# Patient Record
Sex: FEMALE | Race: WHITE | Employment: OTHER | URBAN - NONMETROPOLITAN AREA
[De-identification: names, ages, dates, MRNs, and addresses within clinical notes are randomized per-mention and may not be internally consistent; named-entity substitution may affect disease eponyms.]

---

## 2017-01-16 ENCOUNTER — TELEPHONE (OUTPATIENT)
Dept: FAMILY MEDICINE | Facility: OTHER | Age: 62
End: 2017-01-16

## 2017-01-16 DIAGNOSIS — R82.90 ABNORMAL URINE FINDINGS: ICD-10-CM

## 2017-01-16 DIAGNOSIS — N30.00 ACUTE CYSTITIS WITHOUT HEMATURIA: ICD-10-CM

## 2017-01-16 DIAGNOSIS — E78.5 HYPERLIPIDEMIA LDL GOAL <130: ICD-10-CM

## 2017-01-16 DIAGNOSIS — R30.0 DYSURIA: Primary | ICD-10-CM

## 2017-01-16 LAB
ALBUMIN UR-MCNC: 100 MG/DL
APPEARANCE UR: ABNORMAL
BACTERIA #/AREA URNS HPF: ABNORMAL /HPF
BILIRUB UR QL STRIP: NEGATIVE
COLOR UR AUTO: ABNORMAL
GLUCOSE UR STRIP-MCNC: NEGATIVE MG/DL
HGB UR QL STRIP: ABNORMAL
KETONES UR STRIP-MCNC: NEGATIVE MG/DL
LEUKOCYTE ESTERASE UR QL STRIP: ABNORMAL
NITRATE UR QL: NEGATIVE
PH UR STRIP: 5.5 PH (ref 4.7–8)
RBC #/AREA URNS AUTO: >182 /HPF (ref 0–2)
SP GR UR STRIP: 1.02 (ref 1–1.03)
SQUAMOUS #/AREA URNS AUTO: 1 /HPF (ref 0–1)
URN SPEC COLLECT METH UR: ABNORMAL
UROBILINOGEN UR STRIP-MCNC: NORMAL MG/DL (ref 0–2)
WBC #/AREA URNS AUTO: >182 /HPF (ref 0–2)
WBC CLUMPS #/AREA URNS HPF: PRESENT /HPF

## 2017-01-16 PROCEDURE — 87086 URINE CULTURE/COLONY COUNT: CPT | Performed by: FAMILY MEDICINE

## 2017-01-16 PROCEDURE — 87088 URINE BACTERIA CULTURE: CPT | Performed by: FAMILY MEDICINE

## 2017-01-16 PROCEDURE — 81001 URINALYSIS AUTO W/SCOPE: CPT | Performed by: FAMILY MEDICINE

## 2017-01-16 PROCEDURE — 87186 SC STD MICRODIL/AGAR DIL: CPT | Performed by: FAMILY MEDICINE

## 2017-01-16 RX ORDER — SULFAMETHOXAZOLE/TRIMETHOPRIM 800-160 MG
1 TABLET ORAL 2 TIMES DAILY
Qty: 14 TABLET | Refills: 0 | Status: SHIPPED | OUTPATIENT
Start: 2017-01-16 | End: 2017-09-07

## 2017-01-16 NOTE — TELEPHONE ENCOUNTER
Called and notified unable to prescribe antibiotic over the phone for a UTI without having a UA sample. Order for UA placed in lab.

## 2017-01-16 NOTE — TELEPHONE ENCOUNTER
Tried calling again, left message that if she does have a health concern to make an appointment with Dr Mariela Dolan for evaluation. Scheduling number given.

## 2017-01-16 NOTE — TELEPHONE ENCOUNTER
2:37 PM    Reason for Call: Phone Call    Description: PT wants the nurse to call her back, she said it is personal, and it is a health concern    Was an appointment offered for this call? Yes     Preferred method for responding to this message: Telephone Call  [phone]    If we cannot reach you directly, may we leave a detailed response at the number you provided? No    Can this message wait until your PCP/provider returns, if available today? Not applicable, PCP is in    Jessica Hobbs

## 2017-01-16 NOTE — TELEPHONE ENCOUNTER
2:01 PM    Reason for Call: Phone Call    Description: Patient is inquiring about an antibiotic for a UTI. If you could call back at your earliest convenience [phone]    Was an appointment offered for this call? No patient would like to speak to the nurse before    Preferred method for responding to this message: Telephone Call    If we cannot reach you directly, may we leave a detailed response at the number you provided? Yes    Can this message wait until your PCP/provider returns, if available today? YES    Meme Cam

## 2017-01-18 LAB
BACTERIA SPEC CULT: ABNORMAL
MICRO REPORT STATUS: ABNORMAL
MICROORGANISM SPEC CULT: ABNORMAL
SPECIMEN SOURCE: ABNORMAL

## 2017-01-19 ENCOUNTER — HOSPITAL ENCOUNTER (EMERGENCY)
Facility: HOSPITAL | Age: 62
Discharge: HOME | End: 2017-01-19
Attending: EMERGENCY MEDICINE | Admitting: EMERGENCY MEDICINE
Payer: COMMERCIAL

## 2017-01-19 VITALS
RESPIRATION RATE: 18 BRPM | TEMPERATURE: 98.6 F | OXYGEN SATURATION: 98 % | HEART RATE: 85 BPM | SYSTOLIC BLOOD PRESSURE: 135 MMHG | DIASTOLIC BLOOD PRESSURE: 78 MMHG

## 2017-01-19 DIAGNOSIS — S62.102A FRACTURE OF WRIST, LEFT, CLOSED, INITIAL ENCOUNTER: ICD-10-CM

## 2017-01-19 PROCEDURE — 99283 EMERGENCY DEPT VISIT LOW MDM: CPT

## 2017-01-19 PROCEDURE — 99283 EMERGENCY DEPT VISIT LOW MDM: CPT | Performed by: EMERGENCY MEDICINE

## 2017-01-19 PROCEDURE — 73110 X-RAY EXAM OF WRIST: CPT | Mod: TC,LT

## 2017-01-19 PROCEDURE — 25000132 ZZH RX MED GY IP 250 OP 250 PS 637: Performed by: EMERGENCY MEDICINE

## 2017-01-19 RX ORDER — OXYCODONE AND ACETAMINOPHEN 5; 325 MG/1; MG/1
2 TABLET ORAL ONCE
Status: COMPLETED | OUTPATIENT
Start: 2017-01-19 | End: 2017-01-19

## 2017-01-19 RX ADMIN — OXYCODONE HYDROCHLORIDE AND ACETAMINOPHEN 2 TABLET: 5; 325 TABLET ORAL at 20:28

## 2017-01-19 ASSESSMENT — ENCOUNTER SYMPTOMS
RESPIRATORY NEGATIVE: 1
CARDIOVASCULAR NEGATIVE: 1
ACTIVITY CHANGE: 1

## 2017-01-19 NOTE — ED AVS SNAPSHOT
[facility]    [address]    [place] [address]    Phone:  [phone]                                       Diana Roach   MRN: [id number]    Department:  [facility]   Date of Visit:  1/19/2017           Patient Information     Date Of Birth          1955        Your diagnoses for this visit were:     Fracture of wrist, left, closed, initial encounter        You were seen by Austin Zuluaga MD.      Follow-up Information     Follow up with Mariela Dolan MD.    Specialty:  Family Practice    Contact information:    [facility] [place] [address]  [phone]          Discharge Instructions         Treating Wrist Fractures  A fractured bone starts to heal on its own right away. But a treatment called reduction may help you heal better. Reduction is a process that repositions your bones. The goal is to get them as close as possible to how they were before the fracture. Your doctor will use one or more methods of reduction.     An external fixator is a rigid bar that screws into the bone through tiny holes made in the skin. It holds the fractured segments of bone in place.   Closed reduction  If you have a clean break with little soft tissue damage, closed reduction will probably be used. Before the procedure, you may be given a light anesthetic to relax your muscles. Then your doctor manually readjusts the position of the broken bone. A splint or cast will be worn while you heal.     A plate with tiny screws helps keep the bone stable and in place.   Open reduction  If you have an open fracture (bone sticking out through the skin), badly misaligned sections of bone, or severe tissue injury, open reduction is likely. A general anesthetic may be used during the procedure to let you sleep and relax your muscles. Your doctor then makes one or more incisions to realign the bone and repair soft tissues. Pins, screws, plates, or a combination of  implants may be used under the skin to hold the bone in place during healing. Another device that may be used is an external fixator, which holds the bones in the correct position, and is surgically placed on the outside of the skin.  The road to healing  Fractures take about 6 weeks or more to heal. Keeping your hand raised above your heart can control swelling, throbbing, and pain. Your doctor may prescribe medicine that can help reduce pain. Don t remove a splint unless your doctor says you can. Call your doctor if your pain gets worse or if you notice any excess swelling or redness. Sometimes these implants, especially wires, may need to be removed after the fracture has healed.      5132-3596 [employer]. [address] [place] [address], [place] [address]. All rights reserved. This information is not intended as a substitute for professional medical care. Always follow your healthcare professional's instructions.      Diana,  Your wrist fracture needs surgery sooner rather later so hopefully all will work out as planned tomorrow.  Remember NPO after [place] until they give you the time and make the specific recommendation when they call from [facility].  Good luck!       Review of your medicines      Our records show that you are taking the medicines listed below. If these are incorrect, please call your family doctor or clinic.        Dose / Directions Last dose taken    alendronate 70 MG tablet   Commonly known as:  FOSAMAX   Quantity:  12 tablet        TAKE 1 TABLET BY MOUTH ONCE A WEEK ON AN EMPTY STOMA CH GENERICFOR FOSAMAX.   Refills:  3        IBUPROFEN PO   Dose:  400 mg        Take 400 mg by mouth every 6 hours as needed for moderate pain   Refills:  0        * pramipexole 0.5 MG tablet   Commonly known as:  MIRAPEX   Quantity:  90 tablet        TAKE 1 TO 2 TABLETS BY MOUTH 2 TO 3 HOURS BEFORE BEDTIME   Refills:  9        * pramipexole 0.5 MG tablet   Commonly known as:  MIRAPEX   Quantity:  90 tablet         TAKE 1 TO 2 TABLETS BY MOUTH 2 TO 3 HOURS BEFOR E BEDTIME.   Refills:  8        rosuvastatin 5 MG tablet   Commonly known as:  CRESTOR   Dose:  5 mg   Quantity:  30 tablet        Take 1 tablet (5 mg) by mouth daily   Refills:  3        sulfamethoxazole-trimethoprim 800-160 MG per tablet   Commonly known as:  BACTRIM DS/SEPTRA DS   Dose:  1 tablet   Quantity:  14 tablet        Take 1 tablet by mouth 2 times daily   Refills:  0        SUMAtriptan 100 MG tablet   Commonly known as:  IMITREX   Quantity:  9 tablet        TAKE 1 TABLET BY MOUTH WITH FLUIDS ASAP AFTER START OF MIGRAINE. MAY REPEAT AFTER 2 HOURS IF HEADACH E RETURNS DO NOT E   Refills:  2        UNKNOWN TO PATIENT        Topical ointment prescribed by Dr Townsend   Refills:  0        VANIQA 13.9 % Crea   Quantity:  45 g   Generic drug:  eflornithine HCl        APPLY TO AFFECTED AREAS TWICE A DAY   Refills:  0        ZETIA 10 MG tablet   Quantity:  90 tablet   Generic drug:  ezetimibe        TAKE 1 TABLET (10 MG) BY MOUTH DAILY   Refills:  2        * Notice:  This list has 2 medication(s) that are the same as other medications prescribed for you. Read the directions carefully, and ask your doctor or other care provider to review them with you.            Procedures and tests performed during your visit     Wrist XR, G/E 3 views, left      Orders Needing Specimen Collection     None      Pending Results     Date and Time Order Name Status Description    1/19/2017 1859 Wrist XR, G/E 3 views, left In process             Pending Culture Results     No orders found from 1/18/2017 to 1/20/2017.            Thank you for choosing [facility]       Thank you for choosing [facility] for your care. Our goal is always to provide you with excellent care. Hearing back from our patients is one way we can continue to improve our services. Please take a few minutes to complete the written survey that you may receive in the mail after you visit with us. Thank you!        Gloria  "Information     [employer] lets you send messages to your doctor, view your test results, renew your prescriptions, schedule appointments and more. To sign up, go to [email or web address] . Click on \"Log in\" on the left side of the screen, which will take you to the Welcome page. Then click on \"Sign up Now\" on the right side of the page.     You will be asked to enter the access code listed below, as well as some personal information. Please follow the directions to create your username and password.     Your access code is: [email or web address]  Expires: 2017  8:20 PM     Your access code will  in 90 days. If you need help or a new code, please call your [facility] clinic or [phone].        Care EveryWhere ID     This is your Care EveryWhere ID. This could be used by other organizations to access your [facility] medical records  [id number]        After Visit Summary       This is your record. Keep this with you and show to your community pharmacist(s) and doctor(s) at your next visit.                  "

## 2017-01-19 NOTE — ED AVS SNAPSHOT
[facility]    [address]    [place] [address]    Phone:  [phone]                                       Diana Roach   MRN: [id number]    Department:  [facility]   Date of Visit:  1/19/2017           After Visit Summary Signature Page     I have received my discharge instructions, and my questions have been answered. I have discussed any challenges I see with this plan with the nurse or doctor.    ..........................................................................................................................................  Patient/Patient Representative Signature      ..........................................................................................................................................  Patient Representative Print Name and Relationship to Patient    ..................................................               ................................................  Date                                            Time    ..........................................................................................................................................  Reviewed by Signature/Title    ...................................................              ..............................................  Date                                                            Time

## 2017-01-20 ENCOUNTER — TRANSFERRED RECORDS (OUTPATIENT)
Dept: HEALTH INFORMATION MANAGEMENT | Facility: HOSPITAL | Age: 62
End: 2017-01-20

## 2017-01-20 NOTE — ED NOTES
Pt states slipped on the ice on the driveway and injured her left wrist. Pt presents with ice on. Elevated on a pillow. Rings X2 removed, placed in a cup and labeled. CMS intact.

## 2017-01-20 NOTE — ED NOTES
Patient discharged home at this time. Given written and verbal discharge instructions regarding pain control, RICE and to remain NPO after midnight for tomorrow's surgery. Patient given 2 take home Percocet with administration instructions. Patient verbalized understanding of all discharge instructions. Patient given disc of images prior to discharge.

## 2017-01-20 NOTE — ED PROVIDER NOTES
History     Chief Complaint   Patient presents with     Wrist Pain     HPI  Diana Roach is a 61 year old female who slipped on ice breaking her FOOSH sustaining a painful somewhat deformed left wrist.  No other injuries.     I have reviewed the Medications, Allergies, Past Medical and Surgical History, and Social History in the Epic system.    Review of Systems   Constitutional: Positive for activity change.   HENT: Negative.    Respiratory: Negative.    Cardiovascular: Negative.    Musculoskeletal:        Left wrist pain       Physical Exam   BP: 142/74 mmHg  Pulse: 85  Temp: 98.8  F (37.1  C)  Resp: 18  SpO2: 96 %  Physical Exam   Constitutional: She appears well-developed and well-nourished. She appears distressed.   HENT:   Head: Normocephalic.   Eyes: Conjunctivae and EOM are normal. Pupils are equal, round, and reactive to light.   Neck: Normal range of motion. Neck supple.   Cardiovascular: Normal rate.    Pulmonary/Chest: Effort normal.   Abdominal: Soft.   Musculoskeletal:   Left wrist shows prominent ulnar styloid and foreshortened distal radius.    Neurological: She is alert.   Skin: Skin is warm. She is not diaphoretic.     ED Course   Procedures  Critical Care time:  none    Labs Ordered and Resulted from Time of ED Arrival Up to the Time of Departure from the ED - No data to display    Assessments & Plan (with Medical Decision Making)   Diana sustained a Colles Fx documented on XRs with impaction of the distal radius resulting in severe reversal of the ulnar carpal angle and 30 degree reversal of the volar carpal angle.  Maribel splint was placed.  Denancourt unavailable in the next several days so made arrangements with OA on call Sherwin Olguin who will arrange for surgery tomorrow.   She should be called and will be NPO.    I have reviewed the nursing notes.    I have reviewed the findings, diagnosis, plan and need for follow up with the patient.    New Prescriptions    No medications on file        Final diagnoses:   Fracture of wrist, left, closed, initial encounter       1/19/2017   [facility]      Austin Zuluaga MD  01/19/17 1397

## 2017-01-20 NOTE — DISCHARGE INSTRUCTIONS
Treating Wrist Fractures  A fractured bone starts to heal on its own right away. But a treatment called reduction may help you heal better. Reduction is a process that repositions your bones. The goal is to get them as close as possible to how they were before the fracture. Your doctor will use one or more methods of reduction.     An external fixator is a rigid bar that screws into the bone through tiny holes made in the skin. It holds the fractured segments of bone in place.   Closed reduction  If you have a clean break with little soft tissue damage, closed reduction will probably be used. Before the procedure, you may be given a light anesthetic to relax your muscles. Then your doctor manually readjusts the position of the broken bone. A splint or cast will be worn while you heal.     A plate with tiny screws helps keep the bone stable and in place.   Open reduction  If you have an open fracture (bone sticking out through the skin), badly misaligned sections of bone, or severe tissue injury, open reduction is likely. A general anesthetic may be used during the procedure to let you sleep and relax your muscles. Your doctor then makes one or more incisions to realign the bone and repair soft tissues. Pins, screws, plates, or a combination of implants may be used under the skin to hold the bone in place during healing. Another device that may be used is an external fixator, which holds the bones in the correct position, and is surgically placed on the outside of the skin.  The road to healing  Fractures take about 6 weeks or more to heal. Keeping your hand raised above your heart can control swelling, throbbing, and pain. Your doctor may prescribe medicine that can help reduce pain. Don t remove a splint unless your doctor says you can. Call your doctor if your pain gets worse or if you notice any excess swelling or redness. Sometimes these implants, especially wires, may need to be removed after the fracture has  healed.      [id number] [employer]. [address] [place] [address], [place] [address]. All rights reserved. This information is not intended as a substitute for professional medical care. Always follow your healthcare professional's instructions.      Diana,  Your wrist fracture needs surgery sooner rather later so hopefully all will work out as planned tomorrow.  Remember NPO after MN until they give you the time and make the specific recommendation when they call from [facility].  Good luck!

## 2017-02-01 DIAGNOSIS — M80.00XA AGE-RELATED OSTEOPOROSIS WITH CURRENT PATHOLOGICAL FRACTURE: Primary | ICD-10-CM

## 2017-02-01 DIAGNOSIS — M81.0 OSTEOPOROSIS: ICD-10-CM

## 2017-02-02 ENCOUNTER — TRANSFERRED RECORDS (OUTPATIENT)
Dept: HEALTH INFORMATION MANAGEMENT | Facility: HOSPITAL | Age: 62
End: 2017-02-02

## 2017-02-02 RX ORDER — ALENDRONATE SODIUM 70 MG/1
TABLET ORAL
Qty: 12 TABLET | Refills: 0 | Status: SHIPPED | OUTPATIENT
Start: 2017-02-02 | End: 2017-05-31

## 2017-02-02 NOTE — TELEPHONE ENCOUNTER
Fosamax    Last Written Prescription Date: 12-3-15  Last Fill Quantity: 12, # refills: 3  Last Office Visit with [facility], [facility] or [facility] prescribing provider: 8-2-16       DEXA Scan:  Last order of DX HIP/PELVIS/SPINE was found on 9/16/2013 from Telephone on 9/16/2013     No order of DX HIP/PELVIS/SPINE W LAT FRACTION ANALYSIS is found.       CREATININE   Date Value Ref Range Status   08/17/2014 0.63 0.52 - 1.04 mg/dL Final

## 2017-02-16 ENCOUNTER — TRANSFERRED RECORDS (OUTPATIENT)
Dept: HEALTH INFORMATION MANAGEMENT | Facility: HOSPITAL | Age: 62
End: 2017-02-16

## 2017-03-09 ENCOUNTER — TRANSFERRED RECORDS (OUTPATIENT)
Dept: HEALTH INFORMATION MANAGEMENT | Facility: HOSPITAL | Age: 62
End: 2017-03-09

## 2017-03-27 DIAGNOSIS — G25.81 RESTLESS LEGS SYNDROME (RLS): ICD-10-CM

## 2017-03-27 RX ORDER — ROPINIROLE 0.25 MG/1
0.25 TABLET, FILM COATED ORAL AT BEDTIME
Qty: 30 TABLET | Refills: 1 | Status: SHIPPED | OUTPATIENT
Start: 2017-03-27 | End: 2018-12-24

## 2017-03-27 NOTE — TELEPHONE ENCOUNTER
Ropinirole- was discontinued in 2015 please advise      Last Written Prescription Date: 8/27/15  Last Fill Quantity: 30,  # refills: 1   Last Office Visit with [facility], [facility] or [facility] prescribing provider: 8/2/16

## 2017-04-20 ENCOUNTER — TRANSFERRED RECORDS (OUTPATIENT)
Dept: HEALTH INFORMATION MANAGEMENT | Facility: HOSPITAL | Age: 62
End: 2017-04-20

## 2017-04-28 ENCOUNTER — TELEPHONE (OUTPATIENT)
Dept: FAMILY MEDICINE | Facility: OTHER | Age: 62
End: 2017-04-28

## 2017-04-28 NOTE — TELEPHONE ENCOUNTER
9:52 AM    Reason for Call: Phone Call    Description: Would like Dr KIMBERLY Dolan nurse call back regarding her current health conditions.  Was an appointment offered for this call? No    Preferred method for responding to this message: Telephone Call/[phone]    If we cannot reach you directly, may we leave a detailed response at the number you provided? Yes      Can this message wait until your PCP/provider returns, if available today? No,     Sharona Jules

## 2017-06-09 ENCOUNTER — TELEPHONE (OUTPATIENT)
Dept: GENERAL RADIOLOGY | Facility: HOSPITAL | Age: 62
End: 2017-06-09

## 2017-07-05 ENCOUNTER — TELEPHONE (OUTPATIENT)
Dept: FAMILY MEDICINE | Facility: OTHER | Age: 62
End: 2017-07-05

## 2017-07-05 DIAGNOSIS — Z12.31 VISIT FOR SCREENING MAMMOGRAM: Primary | ICD-10-CM

## 2017-07-05 NOTE — TELEPHONE ENCOUNTER
Reason for call:  REFERRAL   1. Concern: restless leg syndromeHave you seen a provider for this concern? No  2. Who? Dr. Simon [phone]  3. When? As soon as possible  4. What services are you requesting? [facility] patient number [id number] for referral for her   Description: restless leg syndrome  Was an appointment offered for this a call? No      Preferred method for responding to this message:phonecall  If we cannot reach you directly, may we leave a detailed response at the number you provided? yes  Can this message wait until your PCP/Provider returns if not available today? Yes patient is aware she is not in today

## 2017-07-14 ENCOUNTER — HOSPITAL ENCOUNTER (OUTPATIENT)
Dept: GENERAL RADIOLOGY | Facility: HOSPITAL | Age: 62
Discharge: HOME | End: 2017-07-14
Attending: FAMILY MEDICINE | Admitting: FAMILY MEDICINE
Payer: COMMERCIAL

## 2017-07-14 PROCEDURE — 77080 DXA BONE DENSITY AXIAL: CPT | Mod: TC

## 2017-08-02 DIAGNOSIS — Z12.31 VISIT FOR SCREENING MAMMOGRAM: Primary | ICD-10-CM

## 2017-08-02 PROCEDURE — 77063 BREAST TOMOSYNTHESIS BI: CPT | Mod: TC | Performed by: RADIOLOGY

## 2017-08-02 PROCEDURE — G0202 SCR MAMMO BI INCL CAD: HCPCS | Mod: TC | Performed by: RADIOLOGY

## 2017-08-08 ENCOUNTER — OFFICE VISIT (OUTPATIENT)
Dept: FAMILY MEDICINE | Facility: OTHER | Age: 62
End: 2017-08-08
Attending: FAMILY MEDICINE
Payer: COMMERCIAL

## 2017-08-08 VITALS
SYSTOLIC BLOOD PRESSURE: 106 MMHG | BODY MASS INDEX: 26.52 KG/M2 | HEIGHT: 66 IN | WEIGHT: 165 LBS | TEMPERATURE: 98.6 F | HEART RATE: 77 BPM | DIASTOLIC BLOOD PRESSURE: 72 MMHG | OXYGEN SATURATION: 95 %

## 2017-08-08 DIAGNOSIS — M17.12 ARTHRITIS OF KNEE, LEFT: ICD-10-CM

## 2017-08-08 DIAGNOSIS — N87.9: ICD-10-CM

## 2017-08-08 DIAGNOSIS — Z00.00 ROUTINE GENERAL MEDICAL EXAMINATION AT A HEALTH CARE FACILITY: Primary | ICD-10-CM

## 2017-08-08 DIAGNOSIS — E78.5 HYPERLIPIDEMIA WITH TARGET LDL LESS THAN 130: ICD-10-CM

## 2017-08-08 DIAGNOSIS — G25.81 RESTLESS LEG SYNDROME: ICD-10-CM

## 2017-08-08 PROCEDURE — 87624 HPV HI-RISK TYP POOLED RSLT: CPT | Mod: 90 | Performed by: FAMILY MEDICINE

## 2017-08-08 PROCEDURE — 99000 SPECIMEN HANDLING OFFICE-LAB: CPT | Performed by: FAMILY MEDICINE

## 2017-08-08 PROCEDURE — 88142 CYTOPATH C/V THIN LAYER: CPT | Performed by: FAMILY MEDICINE

## 2017-08-08 PROCEDURE — 99396 PREV VISIT EST AGE 40-64: CPT | Performed by: FAMILY MEDICINE

## 2017-08-08 ASSESSMENT — PATIENT HEALTH QUESTIONNAIRE - PHQ9
SUM OF ALL RESPONSES TO PHQ QUESTIONS 1-9: 0
5. POOR APPETITE OR OVEREATING: NOT AT ALL

## 2017-08-08 ASSESSMENT — PAIN SCALES - GENERAL: PAINLEVEL: NO PAIN (0)

## 2017-08-08 ASSESSMENT — ANXIETY QUESTIONNAIRES
6. BECOMING EASILY ANNOYED OR IRRITABLE: NOT AT ALL
2. NOT BEING ABLE TO STOP OR CONTROL WORRYING: NOT AT ALL
3. WORRYING TOO MUCH ABOUT DIFFERENT THINGS: NOT AT ALL
GAD7 TOTAL SCORE: 0
7. FEELING AFRAID AS IF SOMETHING AWFUL MIGHT HAPPEN: NOT AT ALL
1. FEELING NERVOUS, ANXIOUS, OR ON EDGE: NOT AT ALL
5. BEING SO RESTLESS THAT IT IS HARD TO SIT STILL: NOT AT ALL

## 2017-08-08 NOTE — PROGRESS NOTES
SUBJECTIVE:   CC: Diana Ware is an 62 year old woman who presents for preventive health visit.     Healthy Habits:    Do you get at least three servings of calcium containing foods daily (dairy, green leafy vegetables, etc.)? yes    Amount of exercise or daily activities, outside of work: 3 day(s) per week    Problems taking medications regularly No    Medication side effects: Yes lethargy from mirapex.     Have you had an eye exam in the past two years? yes    Do you see a dentist twice per year? yes    Do you have sleep apnea, excessive snoring or daytime drowsiness?no          Chief Complaint   Patient presents with     Physical     Is looking for a referral to the Dr. Diaz at the [facility]. Fax number [phone]. Telephone number [phone]. Her ID number is [id number] for evaluation of restless leg syndrome which she has had since her 30s. She is using Mirapex 0.5 mg at 5 pm then repeated at 8 pm. This is causing considerable drowsiness. She was recently remarried April 1, 2017         Today's PHQ-2 Score: PHQ-2 ( 1999 Pf[employer]) 6/5/2014   Q1: Little interest or pleasure in doing things 0   Q2: Feeling down, depressed or hopeless 0   PHQ-2 Score 0       Abuse: Current or Past(Physical, Sexual or Emotional)- No  Do you feel safe in your environment - Yes    Social History   Substance Use Topics     Smoking status: Never Smoker     Smokeless tobacco: Never Used     Alcohol use Yes      Comment: Socially     The patient does not drink >3 drinks per day nor >7 drinks per week.    Reviewed orders with patient.  Reviewed health maintenance and updated orders accordingly - Yes  BP Readings from Last 3 Encounters:   08/08/17 106/72   01/19/17 135/78   08/02/16 128/80    Wt Readings from Last 3 Encounters:   08/08/17 165 lb (74.8 kg)   08/02/16 160 lb (72.6 kg)   11/19/15 171 lb (77.6 kg)                  Patient Active Problem List   Diagnosis     Advanced care planning/counseling discussion      Osteoporosis     Dysplastic Pap smear     Hyperlipidemia with target LDL less than 130     Migraine     Arthritis of knee, left     Restless leg syndrome     Past Surgical History:   Procedure Laterality Date     APPENDECTOMY  1967     ARTHROSCOPY KNEE  2000    Left knee; medial meniscal tear     ARTHROSCOPY KNEE  2010    RT     COLONOSCOPY  2004    benign polyp, repeat 2014     COLONOSCOPY N/A 10/17/2014    Procedure: COLONOSCOPY;  Surgeon: Renzo Kearney MD;  Location: [facility]     FRACTURE TX, WRIST RT/LT Left 01/2017    plate and screws placed     LAPAROSCOPY DIAGNOSTIC (GENERAL)  1992    adhesions     ORTHOPEDIC SURGERY  2017    ORIF of left distal radius using DVR Biomet Plate     SALPINGECTOMY  1967    left salpingectomy, left oophorectomy; Teratoma     STRESS TEST  2010    Chest pain; negative, in [place]     STRESS THALLIUM TEST  2003    Chest pain; negative     SURGICAL PATHOLOGY EXAM  05/23/2013    [facility] Biopsy, Galileo Franz Mayo for 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, no dysplasia noted on [facility] biopsy       Social History   Substance Use Topics     Smoking status: Never Smoker     Smokeless tobacco: Never Used     Alcohol use Yes      Comment: Socially     Family History   Problem Relation Age of Onset     HEART DISEASE Mother      HEART DISEASE Father      CEREBROVASCULAR DISEASE Father      HEART DISEASE Maternal Grandmother      DIABETES Paternal Grandmother      Breast Cancer Maternal Aunt          Current Outpatient Prescriptions   Medication Sig Dispense Refill     Multiple Vitamins-Minerals (WOMENS MULTIVITAMIN PLUS PO) Take by mouth daily       VITAMIN D, CHOLECALCIFEROL, PO Take 1,000 Units by mouth daily       alendronate (FOSAMAX) 70 MG tablet TAKE 1 TABLET BY MOUTH ONCE A WEEK ON AN EMPTY STOMA CH 12 tablet 0     VANIQA 13.9 % CREA APPLY TO AFFECTED AREAS TWICE A DAY 45 g 0     pramipexole (MIRAPEX) 0.5 MG tablet TAKE 1 TO 2 TABLETS BY MOUTH 2 TO 3 HOURS BEFORE BEDTIME 90 tablet 9     IBUPROFEN  PO Take 400 mg by mouth every 6 hours as needed for moderate pain       rOPINIRole (REQUIP) 0.25 MG tablet Take 1 tablet (0.25 mg) by mouth At Bedtime (Patient not taking: Reported on 8/8/2017) 30 tablet 1     sulfamethoxazole-trimethoprim (BACTRIM DS/SEPTRA DS) 800-160 MG per tablet Take 1 tablet by mouth 2 times daily (Patient not taking: Reported on 8/8/2017) 14 tablet 0     [DISCONTINUED] pramipexole (MIRAPEX) 0.5 MG tablet TAKE 1 TO 2 TABLETS BY MOUTH 2 TO 3 HOURS BEFOR E BEDTIME. 90 tablet 8     rosuvastatin (CRESTOR) 5 MG tablet Take 1 tablet (5 mg) by mouth daily (Patient not taking: Reported on 8/8/2017) 30 tablet 3     SUMAtriptan (IMITREX) 100 MG tablet TAKE 1 TABLET BY MOUTH WITH FLUIDS ASAP AFTER START OF MIGRAINE. MAY REPEAT AFTER 2 HOURS IF HEADACH E RETURNS DO NOT E (Patient not taking: Reported on 8/8/2017) 9 tablet 2     ZETIA 10 MG tablet TAKE 1 TABLET (10 MG) BY MOUTH DAILY (Patient not taking: Reported on 8/8/2017) 90 tablet 2     UNKNOWN TO PATIENT Topical ointment prescribed by Dr Townsend       Allergies   Allergen Reactions     Simvastatin Other (See Comments)     Myalgias and elevated CPK           Patient over age 50, mutual decision to screen reflected in health maintenance.      Pertinent mammograms are reviewed under the imaging tab.  History of abnormal Pap smear: YES - other categories - see link Cervical Cytology Screening Guidelines      Reviewed and updated as needed this visit by clinical staffTobacco  Allergies  Meds  Med Hx  Surg Hx  Fam Hx  Soc Hx        Reviewed and updated as needed this visit by Provider        Past Medical History:   Diagnosis Date     Hyperlipidaemia LDL goal < 130 09/11/2003     menopause 12/04/2001     Migraine headaches 08/31/2000     Moderate dysplasia of cervix (ELGIN II) 03/11/2013    colposcopy results of ASCUS pap with positive HPV, evaluated at [place], [facility] biopsy was recommended and done 5/2013     Osteoporosis 01/23/2003    noted on Dexa scan  2003     Papanicolaou smear of cervix with low grade squamous intraepithelial lesion (LGSIL) 03/23/2009    colposcopy revealed ELGIN I     Restless legs syndrome (RLS) 01/23/2012     Superficial injury of cornea 01/23/2012      Past Surgical History:   Procedure Laterality Date     APPENDECTOMY  1967     ARTHROSCOPY KNEE  2000    Left knee; medial meniscal tear     ARTHROSCOPY KNEE  2010    RT     COLONOSCOPY  2004    benign polyp, repeat 2014     COLONOSCOPY N/A 10/17/2014    Procedure: COLONOSCOPY;  Surgeon: Renzo Kearney MD;  Location: [facility]     FRACTURE TX, WRIST RT/LT Left 01/2017    plate and screws placed     LAPAROSCOPY DIAGNOSTIC (GENERAL)  1992    adhesions     ORTHOPEDIC SURGERY  2017    ORIF of left distal radius using DVR Biomet Plate     SALPINGECTOMY  1967    left salpingectomy, left oophorectomy; Teratoma     STRESS TEST  2010    Chest pain; negative, in [place]     STRESS THALLIUM TEST  2003    Chest pain; negative     SURGICAL PATHOLOGY EXAM  05/23/2013    [facility] Biopsy, Galileo Franz Mayo for 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, no dysplasia noted on [facility] biopsy     Obstetric History     No data available          ROS:  C: NEGATIVE for fever, chills, change in weight  I: NEGATIVE for worrisome rashes, moles or lesions  E: NEGATIVE for vision changes or irritation  ENT: NEGATIVE for ear, mouth and throat problems  R: NEGATIVE for significant cough or SOB  B: NEGATIVE for masses, tenderness or discharge  CV: NEGATIVE for chest pain, palpitations or peripheral edema  GI: NEGATIVE for nausea, abdominal pain, heartburn, or change in bowel habits  : NEGATIVE for unusual urinary or vaginal symptoms. No vaginal bleeding.  M: NEGATIVE for significant arthralgias or myalgia  N: NEGATIVE for weakness, dizziness or paresthesias  E: NEGATIVE for temperature intolerance, skin/hair changes  H: NEGATIVE for bleeding problems  P: NEGATIVE for changes in mood or affect     OBJECTIVE:   /72 (BP Location: Left  "arm, Patient Position: Chair, Cuff Size: Adult Regular)  Pulse 77  Temp 98.6  F (37  C) (Tympanic)  Ht 5' 6\" (1.676 m)  Wt 165 lb (74.8 kg)  SpO2 95%  BMI 26.63 kg/m2  EXAM:  GENERAL APPEARANCE: healthy, alert and no distress  EYES: Eyes grossly normal to inspection, PERRL and conjunctivae and sclerae normal  HENT: ear canals and TM's normal, nose and mouth without ulcers or lesions, oropharynx clear and oral mucous membranes moist  NECK: no adenopathy, no asymmetry, masses, or scars and thyroid normal to palpation  RESP: lungs clear to auscultation - no rales, rhonchi or wheezes  BREAST: normal without masses, tenderness or nipple discharge and no palpable axillary masses or adenopathy  CV: regular rate and rhythm, normal S1 S2, no S3 or S4, no murmur, click or rub, no peripheral edema and peripheral pulses strong  ABDOMEN: soft, nontender, no hepatosplenomegaly, no masses and bowel sounds normal   (female): normal female external genitalia, normal urethral meatus, vaginal mucosal atrophy noted, normal cervix, adnexae, and uterus without masses or abnormal discharge. Normal rectal exam  MS: no musculoskeletal defects are noted and gait is age appropriate without ataxia  SKIN: no suspicious lesions or rashes  NEURO: Normal strength and tone, sensory exam grossly normal, mentation intact and speech normal  PSYCH: mentation appears normal and affect normal/bright    ASSESSMENT/PLAN:   1. Routine general medical examination at a health care facility  Doing well, mammogram done earlier  - HPV High Risk Types DNA Cervical  - A pap thin layer screen with  HPV - recommended age 30 - 65 years (select HPV order below)  - Glucose; Future    2. Hyperlipidemia with target LDL less than 130  Recheck fasting labs another day  - Lipid Profile; Future  - AST; Future  - ALT; Future    3. Restless leg syndrome  Refer to [place] for futher evaluation for her longstanding problem which is now affecting her arms as well  - " "NEUROLOGY ADULT REFERRAL    4. Dysplastic Pap smear  2013, recheck today. The last annual paps have been normal. She has preferred to recheck. If today's is normal with normal HPV testing can space this out for her  - HPV High Risk Types DNA Cervical  - A pap thin layer screen with  HPV - recommended age 30 - 65 years (select HPV order below)    5. Arthritis of knee, left  Managing with OTC medication      COUNSELING:   Reviewed preventive health counseling, as reflected in patient instructions       Regular exercise       Healthy diet/nutrition       Consider Hep C screening for patients born between 1945 and 1965, she has donated blood 51 times and has never had concerns with testing of this, so testing is declined         reports that she has never smoked. She has never used smokeless tobacco.    Estimated body mass index is 26.63 kg/(m^2) as calculated from the following:    Height as of this encounter: 5' 6\" (1.676 m).    Weight as of this encounter: 165 lb (74.8 kg).   Weight management plan: Discussed healthy diet and exercise guidelines and patient will follow up in 12 months in clinic to re-evaluate.    Counseling Resources:  ATP IV Guidelines  Pooled Cohorts Equation Calculator  Breast Cancer Risk Calculator  FRAX Risk Assessment  ICSI Preventive Guidelines  Dietary Guidelines for Americans, 2010  USDA's MyPlate  ASA Prophylaxis  Lung CA Screening    Mariela Dolan MD  [facility] [place]  "

## 2017-08-08 NOTE — MR AVS SNAPSHOT
After Visit Summary   8/8/2017    Diana Ware    MRN: [id number]           Patient Information     Date Of Birth          1955        Visit Information        Provider Department      8/8/2017 1:30 PM Mariela Dolan MD [facility]        Today's Diagnoses     Routine general medical examination at a health care facility    -  1    Hyperlipidemia with target LDL less than 130        Restless leg syndrome        Dysplastic Pap smear        Arthritis of knee, left          Care Instructions      Preventive Health Recommendations  Female Ages 50 - 64    Yearly exam: See your health care provider every year in order to  o Review health changes.   o Discuss preventive care.    o Review your medicines if your doctor has prescribed any.      Get a Pap test every three years (unless you have an abnormal result and your provider advises testing more often).    If you get Pap tests with HPV test, you only need to test every 5 years, unless you have an abnormal result.     You do not need a Pap test if your uterus was removed (hysterectomy) and you have not had cancer.    You should be tested each year for STDs (sexually transmitted diseases) if you're at risk.     Have a mammogram every 1 to 2 years.    Have a colonoscopy at age 50, or have a yearly FIT test (stool test). These exams screen for colon cancer.      Have a cholesterol test every 5 years, or more often if advised.    Have a diabetes test (fasting glucose) every three years. If you are at risk for diabetes, you should have this test more often.     If you are at risk for osteoporosis (brittle bone disease), think about having a bone density scan (DEXA).    Shots: Get a flu shot each year. Get a tetanus shot every 10 years.    Nutrition:     Eat at least 5 servings of fruits and vegetables each day.    Eat whole-grain bread, whole-wheat pasta and brown rice instead of white grains and rice.    Talk to your provider about Calcium  and Vitamin D.     Lifestyle    Exercise at least 150 minutes a week (30 minutes a day, 5 days a week). This will help you control your weight and prevent disease.    Limit alcohol to one drink per day.    No smoking.     Wear sunscreen to prevent skin cancer.     See your dentist every six months for an exam and cleaning.    See your eye doctor every 1 to 2 years.            Follow-ups after your visit        Additional Services     NEUROLOGY ADULT REFERRAL       Your provider has referred you for the following:    Dr. Diaz at the [facility]. Fax number [phone]. Telephone number [phone]. For restless leg syndrome    Please be aware that coverage of these services is subject to the terms and limitations of your health insurance plan.  Call member services at your health plan with any benefit or coverage questions.      Please bring the following with you to your appointment:    (1) Any X-Rays, CTs or MRIs which have been performed.  Contact the facility where they were done to arrange for  prior to your scheduled appointment.    (2) List of current medications  (3) This referral request   (4) Any documents/labs given to you for this referral                  Follow-up notes from your care team     Return in about 1 year (around 8/8/2018) for Physical Exam.      Future tests that were ordered for you today     Open Future Orders        Priority Expected Expires Ordered    Lipid Profile Routine  8/8/2018 8/8/2017    Glucose Routine  8/8/2018 8/8/2017    AST Routine  8/8/2018 8/8/2017    ALT Routine  8/8/2018 8/8/2017            Who to contact     If you have questions or need follow up information about today's clinic visit or your schedule please contact [facility] directly at [phone].  Normal or non-critical lab and imaging results will be communicated to you by MyChart, letter or phone within 4 business days after the clinic has received the results. If you do not hear  "from us within 7 days, please contact the clinic through [employer] or phone. If you have a critical or abnormal lab result, we will notify you by phone as soon as possible.  Submit refill requests through [employer] or call your pharmacy and they will forward the refill request to us. Please allow 3 business days for your refill to be completed.          Additional Information About Your Visit        [employer] Information     [employer] lets you send messages to your doctor, view your test results, renew your prescriptions, schedule appointments and more. To sign up, go to [email or web address] . Click on \"Log in\" on the left side of the screen, which will take you to the Welcome page. Then click on \"Sign up Now\" on the right side of the page.     You will be asked to enter the access code listed below, as well as some personal information. Please follow the directions to create your username and password.     Your access code is: [email or web address]  Expires: 2017  9:14 AM     Your access code will  in 90 days. If you need help or a new code, please call your [place] clinic or [phone].        Care EveryWhere ID     This is your Care EveryWhere ID. This could be used by other organizations to access your [place] medical records  [id number]        Your Vitals Were     Pulse Temperature Height Pulse Oximetry BMI (Body Mass Index)       77 98.6  F (37  C) (Tympanic) 5' 6\" (1.676 m) 95% 26.63 kg/m2        Blood Pressure from Last 3 Encounters:   17 106/72   17 135/78   16 128/80    Weight from Last 3 Encounters:   17 165 lb (74.8 kg)   16 160 lb (72.6 kg)   11/19/15 171 lb (77.6 kg)              We Performed the Following     A pap thin layer screen with  HPV - recommended age 30 - 65 years (select HPV order below)     HPV High Risk Types DNA Cervical     NEUROLOGY ADULT REFERRAL        Primary Care Provider Office Phone # Fax #    Mariela Dolan [phone]       " [facility] [place] [address]  [place] [address]        Equal Access to Services     KEYSHAWNROSETTA AMANDO : Hadii aad ku hadshantio Soeneidaali, waaxda luqadaha, qaybta kaalmada adepatriciada, waxhitesh jerman shitalanthony allenestela downs sylvester palma. So [facility] [phone].    ATENCIÓN: Si habla español, tiene a beckman disposición servicios gratuitos de asistencia lingüística. Llame al [phone].    We comply with applicable federal civil rights laws and [place] laws. We do not discriminate on the basis of race, color, national origin, age, disability sex, sexual orientation or gender identity.            Thank you!     Thank you for choosing [facility]  for your care. Our goal is always to provide you with excellent care. Hearing back from our patients is one way we can continue to improve our services. Please take a few minutes to complete the written survey that you may receive in the mail after your visit with us. Thank you!             Your Updated Medication List - Protect others around you: Learn how to safely use, store and throw away your medicines at www.disposemymeds.org.          This list is accurate as of: 8/8/17  9:51 PM.  Always use your most recent med list.                   Brand Name Dispense Instructions for use Diagnosis    alendronate 70 MG tablet    FOSAMAX    12 tablet    TAKE 1 TABLET BY MOUTH ONCE A WEEK ON AN EMPTY STOMA CH    Osteoporosis       IBUPROFEN PO      Take 400 mg by mouth every 6 hours as needed for moderate pain        pramipexole 0.5 MG tablet    MIRAPEX    90 tablet    TAKE 1 TO 2 TABLETS BY MOUTH 2 TO 3 HOURS BEFORE BEDTIME    Restless legs syndrome (RLS)       rOPINIRole 0.25 MG tablet    REQUIP    30 tablet    Take 1 tablet (0.25 mg) by mouth At Bedtime    Restless legs syndrome (RLS)       rosuvastatin 5 MG tablet    CRESTOR    30 tablet    Take 1 tablet (5 mg) by mouth daily    Hyperlipidemia LDL goal <130       sulfamethoxazole-trimethoprim 800-160 MG per tablet    BACTRIM  DS/SEPTRA DS    14 tablet    Take 1 tablet by mouth 2 times daily    Acute cystitis without hematuria       SUMAtriptan 100 MG tablet    IMITREX    9 tablet    TAKE 1 TABLET BY MOUTH WITH FLUIDS ASAP AFTER START OF MIGRAINE. MAY REPEAT AFTER 2 HOURS IF HEADACH E RETURNS DO NOT E    Headache       UNKNOWN TO PATIENT      Topical ointment prescribed by Dr Kristian HERNANDEZ 13.9 % Crea   Generic drug:  eflornithine HCl     45 g    APPLY TO AFFECTED AREAS TWICE A DAY    Diagnosis unknown       VITAMIN D (CHOLECALCIFEROL) PO      Take 1,000 Units by mouth daily        WOMENS MULTIVITAMIN PLUS PO      Take by mouth daily        ZETIA 10 MG tablet   Generic drug:  ezetimibe     90 tablet    TAKE 1 TABLET (10 MG) BY MOUTH DAILY    Hyperlipidemia

## 2017-08-08 NOTE — NURSING NOTE
"Chief Complaint   Patient presents with     Physical     Is looking for a referral to the Dr. Diaz at the [facility]. Fax number [phone]. Telephone number [phone]. Her ID number is [id number]       Initial /72 (BP Location: Left arm, Patient Position: Chair, Cuff Size: Adult Regular)  Pulse 77  Temp 98.6  F (37  C) (Tympanic)  Ht 5' 6\" (1.676 m)  Wt 165 lb (74.8 kg)  SpO2 95%  BMI 26.63 kg/m2 Estimated body mass index is 26.63 kg/(m^2) as calculated from the following:    Height as of this encounter: 5' 6\" (1.676 m).    Weight as of this encounter: 165 lb (74.8 kg).  Medication Reconciliation: complete   Rosette Shah LPN    "

## 2017-08-08 NOTE — LETTER
[facility] [place]  [address] [place] [address]  [phone]        August 15, 2017    Diana Ware  [address] [place] [address]          Dear Diana Ware    Your pap smear is normal.  It is generally recommended that women have a yearly pelvic exam.  If your provider  requested that you have a pap smear more frequently because of any previous problems please schedule that appointment as requested.  If you have any questions please call the clinic at [phone].      Sincerely,        Mariela Dolan MD

## 2017-08-09 ASSESSMENT — ANXIETY QUESTIONNAIRES: GAD7 TOTAL SCORE: 0

## 2017-08-15 LAB
COPATH REPORT: NORMAL
PAP: NORMAL

## 2017-08-16 LAB
FINAL DIAGNOSIS: NORMAL
HPV HR 12 DNA CVX QL NAA+PROBE: NEGATIVE
HPV16 DNA SPEC QL NAA+PROBE: NEGATIVE
HPV18 DNA SPEC QL NAA+PROBE: NEGATIVE
SPECIMEN DESCRIPTION: NORMAL

## 2017-08-22 DIAGNOSIS — E78.5 HYPERLIPIDEMIA WITH TARGET LDL LESS THAN 130: ICD-10-CM

## 2017-08-22 DIAGNOSIS — Z00.00 ROUTINE GENERAL MEDICAL EXAMINATION AT A HEALTH CARE FACILITY: ICD-10-CM

## 2017-08-22 LAB
ALT SERPL W P-5'-P-CCNC: 36 U/L (ref 0–50)
AST SERPL W P-5'-P-CCNC: 20 U/L (ref 0–45)
CHOLEST SERPL-MCNC: 246 MG/DL
GLUCOSE SERPL-MCNC: 111 MG/DL (ref 70–99)
HDLC SERPL-MCNC: 61 MG/DL
LDLC SERPL CALC-MCNC: 155 MG/DL
NONHDLC SERPL-MCNC: 185 MG/DL
TRIGL SERPL-MCNC: 151 MG/DL

## 2017-08-22 PROCEDURE — 84450 TRANSFERASE (AST) (SGOT): CPT | Performed by: FAMILY MEDICINE

## 2017-08-22 PROCEDURE — 84460 ALANINE AMINO (ALT) (SGPT): CPT | Performed by: FAMILY MEDICINE

## 2017-08-22 PROCEDURE — 80061 LIPID PANEL: CPT | Performed by: FAMILY MEDICINE

## 2017-08-22 PROCEDURE — 82947 ASSAY GLUCOSE BLOOD QUANT: CPT | Performed by: FAMILY MEDICINE

## 2017-08-22 PROCEDURE — 36415 COLL VENOUS BLD VENIPUNCTURE: CPT | Performed by: FAMILY MEDICINE

## 2017-09-07 ENCOUNTER — OFFICE VISIT (OUTPATIENT)
Dept: FAMILY MEDICINE | Facility: OTHER | Age: 62
End: 2017-09-07
Attending: FAMILY MEDICINE
Payer: COMMERCIAL

## 2017-09-07 VITALS
BODY MASS INDEX: 26.52 KG/M2 | SYSTOLIC BLOOD PRESSURE: 138 MMHG | DIASTOLIC BLOOD PRESSURE: 84 MMHG | HEART RATE: 74 BPM | HEIGHT: 66 IN | WEIGHT: 165 LBS | OXYGEN SATURATION: 98 %

## 2017-09-07 DIAGNOSIS — E78.5 HYPERLIPIDEMIA LDL GOAL <130: Primary | ICD-10-CM

## 2017-09-07 DIAGNOSIS — G25.81 RESTLESS LEGS SYNDROME (RLS): ICD-10-CM

## 2017-09-07 DIAGNOSIS — R03.0 ELEVATED BLOOD PRESSURE READING WITHOUT DIAGNOSIS OF HYPERTENSION: ICD-10-CM

## 2017-09-07 PROCEDURE — 99213 OFFICE O/P EST LOW 20 MIN: CPT | Performed by: FAMILY MEDICINE

## 2017-09-07 RX ORDER — ROSUVASTATIN CALCIUM 5 MG/1
5 TABLET, COATED ORAL DAILY
Qty: 90 TABLET | Refills: 3 | Status: SHIPPED | OUTPATIENT
Start: 2017-09-07 | End: 2018-12-18

## 2017-09-07 ASSESSMENT — ANXIETY QUESTIONNAIRES
1. FEELING NERVOUS, ANXIOUS, OR ON EDGE: NOT AT ALL
GAD7 TOTAL SCORE: 0
5. BEING SO RESTLESS THAT IT IS HARD TO SIT STILL: NOT AT ALL
6. BECOMING EASILY ANNOYED OR IRRITABLE: NOT AT ALL
7. FEELING AFRAID AS IF SOMETHING AWFUL MIGHT HAPPEN: NOT AT ALL
3. WORRYING TOO MUCH ABOUT DIFFERENT THINGS: NOT AT ALL
IF YOU CHECKED OFF ANY PROBLEMS ON THIS QUESTIONNAIRE, HOW DIFFICULT HAVE THESE PROBLEMS MADE IT FOR YOU TO DO YOUR WORK, TAKE CARE OF THINGS AT HOME, OR GET ALONG WITH OTHER PEOPLE: NOT DIFFICULT AT ALL
4. TROUBLE RELAXING: NOT AT ALL
2. NOT BEING ABLE TO STOP OR CONTROL WORRYING: NOT AT ALL

## 2017-09-07 ASSESSMENT — PATIENT HEALTH QUESTIONNAIRE - PHQ9: SUM OF ALL RESPONSES TO PHQ QUESTIONS 1-9: 0

## 2017-09-07 ASSESSMENT — PAIN SCALES - GENERAL: PAINLEVEL: NO PAIN (0)

## 2017-09-07 NOTE — NURSING NOTE
"Chief Complaint   Patient presents with     RECHECK     follow up labs from 8/22/17 elevated lipids     Hypertension     elevated blood pressure today        Initial /84  Pulse 74  Ht 5' 6\" (1.676 m)  Wt 165 lb (74.8 kg)  SpO2 98%  BMI 26.63 kg/m2 Estimated body mass index is 26.63 kg/(m^2) as calculated from the following:    Height as of this encounter: 5' 6\" (1.676 m).    Weight as of this encounter: 165 lb (74.8 kg).  Medication Reconciliation: complete   Moon Jalloh      "

## 2017-09-07 NOTE — Clinical Note
Please send this note to [facility], see referral for fax number, and any other relevant notes with RLS. Thank you!

## 2017-09-07 NOTE — MR AVS SNAPSHOT
"              After Visit Summary   9/7/2017    Diana Ware    MRN: [id number]           Patient Information     Date Of Birth          1955        Visit Information        Provider Department      9/7/2017 2:00 PM Mariela Dolan MD [facility] [place]        Today's Diagnoses     Hyperlipidemia LDL goal <130    -  1    Restless legs syndrome (RLS)        Elevated blood pressure reading without diagnosis of hypertension           Follow-ups after your visit        Follow-up notes from your care team     Return in about 3 months (around 12/7/2017) for Lab Work.      Your next 10 appointments already scheduled     Dec 07, 2017  2:15 PM CST   (Arrive by 2:00 PM)   SHORT with Mariela Dolan MD   [facility] [place] ([facility] [place] )    [address]  [place] [address]   [phone]              Who to contact     If you have questions or need follow up information about today's clinic visit or your schedule please contact [facility] directly at [phone].  Normal or non-critical lab and imaging results will be communicated to you by Thoughtlyhart, letter or phone within 4 business days after the clinic has received the results. If you do not hear from us within 7 days, please contact the clinic through Riset or phone. If you have a critical or abnormal lab result, we will notify you by phone as soon as possible.  Submit refill requests through Vdancer or call your pharmacy and they will forward the refill request to us. Please allow 3 business days for your refill to be completed.          Additional Information About Your Visit        Thoughtlyhart Information     Vdancer lets you send messages to your doctor, view your test results, renew your prescriptions, schedule appointments and more. To sign up, go to [email or web address] . Click on \"Log in\" on the left side of the screen, which will take you to the Welcome page. Then click on \"Sign up Now\" on the right side of " "the page.     You will be asked to enter the access code listed below, as well as some personal information. Please follow the directions to create your username and password.     Your access code is: [email or web address]  Expires: 2017  9:33 PM     Your access code will  in 90 days. If you need help or a new code, please call your [place] clinic or [phone].        Care EveryWhere ID     This is your Care EveryWhere ID. This could be used by other organizations to access your [place] medical records  [id number]        Your Vitals Were     Pulse Height Pulse Oximetry BMI (Body Mass Index)          74 5' 6\" (1.676 m) 98% 26.63 kg/m2         Blood Pressure from Last 3 Encounters:   17 138/84   17 106/72   17 135/78    Weight from Last 3 Encounters:   17 165 lb (74.8 kg)   17 165 lb (74.8 kg)   16 160 lb (72.6 kg)              Today, you had the following     No orders found for display         Where to get your medicines      These medications were sent to [facility] - [place], [place] - [address]  [address] [address]     Phone:  [phone]     rosuvastatin 5 MG tablet          Primary Care Provider Office Phone # Fax #    Mariela Dolan [phone]       [facility] [place] [address] [place] [id number]        Equal Access to Services     ROSETTA GOEL AH: Hadii aad ku hadasho Soomaali, waaxda luqadaha, qaybta kaalmada adeegyada, waxay jerman phan . So [facility] [phone].    ATENCIÓN: Si habla español, tiene a beckman disposición servicios gratuitos de asistencia lingüística. Llame al [phone].    We comply with applicable federal civil rights laws and [place] laws. We do not discriminate on the basis of race, color, national origin, age, disability sex, sexual orientation or gender identity.            Thank you!     Thank you for choosing [facility]  for your care. Our goal " is always to provide you with excellent care. Hearing back from our patients is one way we can continue to improve our services. Please take a few minutes to complete the written survey that you may receive in the mail after your visit with us. Thank you!             Your Updated Medication List - Protect others around you: Learn how to safely use, store and throw away your medicines at www.disposemymeds.org.          This list is accurate as of: 9/7/17  9:33 PM.  Always use your most recent med list.                   Brand Name Dispense Instructions for use Diagnosis    alendronate 70 MG tablet    FOSAMAX    12 tablet    TAKE 1 TABLET BY MOUTH ONCE A WEEK ON AN EMPTY STOMA CH    Osteoporosis       CALCIUM 1200 PO           IBUPROFEN PO      Take 400 mg by mouth every 6 hours as needed for moderate pain        pramipexole 0.5 MG tablet    MIRAPEX    90 tablet    TAKE 1 TO 2 TABLETS BY MOUTH 2 TO 3 HOURS BEFORE BEDTIME    Restless legs syndrome (RLS)       rOPINIRole 0.25 MG tablet    REQUIP    30 tablet    Take 1 tablet (0.25 mg) by mouth At Bedtime    Restless legs syndrome (RLS)       rosuvastatin 5 MG tablet    CRESTOR    90 tablet    Take 1 tablet (5 mg) by mouth daily    Hyperlipidemia LDL goal <130       UNKNOWN TO PATIENT      Topical ointment prescribed by Dr Kristian HERNANDEZ 13.9 % Crea   Generic drug:  eflornithine HCl     45 g    APPLY TO AFFECTED AREAS TWICE A DAY    Diagnosis unknown       WOMENS MULTIVITAMIN PLUS PO      Take by mouth daily        ZETIA 10 MG tablet   Generic drug:  ezetimibe     90 tablet    TAKE 1 TABLET (10 MG) BY MOUTH DAILY    Hyperlipidemia

## 2017-09-08 ENCOUNTER — TELEPHONE (OUTPATIENT)
Dept: FAMILY MEDICINE | Facility: OTHER | Age: 62
End: 2017-09-08

## 2017-09-08 ASSESSMENT — ANXIETY QUESTIONNAIRES: GAD7 TOTAL SCORE: 0

## 2017-09-08 NOTE — PROGRESS NOTES
[facility]    Diana Ware, 62 year old, female presents with   Chief Complaint   Patient presents with     RECHECK     follow up labs from 8/22/17 elevated lipids. She had been changed from Crestor low dose to Zetia which she isn't taking. She wanted to try something other than Crestor and wasn't having side effects. She had fasting labs done which are reviewed today     Hypertension     elevated blood pressure today 142/104 when she was at the blood bank to give blood but wasn't able to donate. Her pressure now is normal. She hasn't had issues in the past     RLS     she has severe RLS, failed Requip, and has been taking Mirapex one at 7 pm and one at 4 pm as her symptoms would occur early. the Mirapex makes her tired so that she really can't do anything in the evening. Her sister also has severe RLS and is seeing a physcian at [facility] and using a stimulation pad along with medication. Her parents also had severe RLS; 5 of 6 of her family members have symptoms.  She has started taking magnesium 200 mg daily along with iron and this has allowed her to eliminate one Mirapex at 4 o'clock. We have referred her to [facility] with Dr Diaz for evaluation and she is waiting to schedule an appointment.       PAST MEDICAL HISTORY:  Past Medical History:   Diagnosis Date     Hyperlipidaemia LDL goal < 130 09/11/2003     menopause 12/04/2001     Migraine headaches 08/31/2000     Moderate dysplasia of cervix (ELGIN II) 03/11/2013    colposcopy results of ASCUS pap with positive HPV, evaluated at [facility], [facility] biopsy was recommended and done 5/2013     Osteoporosis 01/23/2003    noted on Dexa scan 2003     Papanicolaou smear of cervix with low grade squamous intraepithelial lesion (LGSIL) 03/23/2009    colposcopy revealed ELGIN I     Restless legs syndrome (RLS) 01/23/2012     Superficial injury of cornea 01/23/2012       PAST SURGICAL HISTORY:  Past Surgical History:   Procedure Laterality Date     APPENDECTOMY  1967      ARTHROSCOPY KNEE  2000    Left knee; medial meniscal tear     ARTHROSCOPY KNEE  2010    RT     COLONOSCOPY  2004    benign polyp, repeat 2014     COLONOSCOPY N/A 10/17/2014    Procedure: COLONOSCOPY;  Surgeon: Renzo Kearney MD;  Location: [facility] OR     FRACTURE TX, WRIST RT/LT Left 01/2017    plate and screws placed     LAPAROSCOPY DIAGNOSTIC (GENERAL)  1992    adhesions     ORTHOPEDIC SURGERY  2017    ORIF of left distal radius using DVR Biomet Plate     SALPINGECTOMY  1967    left salpingectomy, left oophorectomy; Teratoma     STRESS TEST  2010    Chest pain; negative, in [place]     STRESS THALLIUM TEST  2003    Chest pain; negative     SURGICAL PATHOLOGY EXAM  05/23/2013    [facility] Biopsy, [facility] for 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, no dysplasia noted on Cone biopsy       SOCIAL HISTORY  Social History     Social History     Marital status:      Spouse name: N/A     Number of children: N/A     Years of education: N/A     Occupational History           full-time     Social History Main Topics     Smoking status: Never Smoker     Smokeless tobacco: Never Used     Alcohol use Yes      Comment: Socially     Drug use: No     Sexual activity: Not on file     Other Topics Concern     Caffeine Concern Yes     2 cups coffee/day     Social History Narrative    8/14 Diana lives alone.  She is a  for [employer].  2 sons and each have two children.  C: [phone]       MEDICATIONS:  Prior to Admission medications    Medication Sig Start Date End Date Taking? Authorizing Provider   Calcium Carbonate-Vit D-Min (CALCIUM 1200 PO)    Yes Reported, Patient   rosuvastatin (CRESTOR) 5 MG tablet Take 1 tablet (5 mg) by mouth daily 9/7/17  Yes Mariela Dolan MD   Multiple Vitamins-Minerals (WOMENS MULTIVITAMIN PLUS PO) Take by mouth daily   Yes Reported, Patient   alendronate (FOSAMAX) 70 MG tablet TAKE 1 TABLET BY MOUTH ONCE A WEEK ON AN EMPTY STOMA CH 6/1/17  Yes Mariela Dolan MD  "  VANIQA 13.9 % CREA APPLY TO AFFECTED AREAS TWICE A DAY 3/7/16  Yes Mariela Dolan MD   pramipexole (MIRAPEX) 0.5 MG tablet TAKE 1 TO 2 TABLETS BY MOUTH 2 TO 3 HOURS BEFORE BEDTIME 11/19/15  Yes Mariela Dolan MD   UNKNOWN TO PATIENT Topical ointment prescribed by Dr Townsend   Yes Reported, Patient   IBUPROFEN PO Take 400 mg by mouth every 6 hours as needed for moderate pain   Yes Reported, Patient   rOPINIRole (REQUIP) 0.25 MG tablet Take 1 tablet (0.25 mg) by mouth At Bedtime  Patient not taking: Reported on 8/8/2017 3/27/17   Mariela Dolan MD   ZETIA 10 MG tablet TAKE 1 TABLET (10 MG) BY MOUTH DAILY  Patient not taking: Reported on 8/8/2017 6/24/15   Mariela Dolan MD       ALLERGIES:     Allergies   Allergen Reactions     Simvastatin Other (See Comments)     Myalgias and elevated CPK       ROS:  C: NEGATIVE for fever, chills, change in weight  R: NEGATIVE for significant cough or SOB  CV: NEGATIVE for chest pain, palpitations or peripheral edema  N: NEGATIVE for weakness, dizziness or paresthesias  P: NEGATIVE for changes in mood or affect    EXAM:  /84  Pulse 74  Ht 5' 6\" (1.676 m)  Wt 165 lb (74.8 kg)  SpO2 98%  BMI 26.63 kg/m2 Body mass index is 26.63 kg/(m^2).   GENERAL APPEARANCE: healthy, alert and no distress  RESP: lungs clear to auscultation - no rales, rhonchi or wheezes  CV: regular rates and rhythm, normal S1 S2, no S3 or S4 and no murmur, click or rub  NEURO: Normal strength and tone, mentation intact and speech normal  PSYCH: mentation appears normal and affect normal/bright    LABS AND IMAGING:     Results for orders placed or performed in visit on 08/22/17   Lipid Profile   Result Value Ref Range    Cholesterol 246 (H) <200 mg/dL    Triglycerides 151 (H) <150 mg/dL    HDL Cholesterol 61 >49 mg/dL    LDL Cholesterol Calculated 155 (H) <100 mg/dL    Non HDL Cholesterol 185 (H) <130 mg/dL   Glucose   Result Value Ref Range    Glucose 111 (H) 70 - 99 mg/dL   AST   Result Value Ref " Range    AST 20 0 - 45 U/L   ALT   Result Value Ref Range    ALT 36 0 - 50 U/L         ASSESSMENT/PLAN:  (E78.5) Hyperlipidemia LDL goal <130  (primary encounter diagnosis)  Comment: not well controlled  Plan: rosuvastatin (CRESTOR) 5 MG tablet        After discussion we have decided to restart Crestor at 5 mg daily and will recheck in 3 months with lab    (G25.81) Restless legs syndrome (RLS)  Comment:   Plan: severe. [place] has asked for more information to be sent prior to scheduling an appointment so we will do this for her    (R03.0) Elevated blood pressure reading without diagnosis of hypertension  Comment:   Plan: normal at this time; will follow       Mariela Dolan MD  September 7, 2017

## 2017-09-11 NOTE — TELEPHONE ENCOUNTER
Received Approval for Rosuvastatin Calcium(Crestor) on 09/11/2017. Effective 09/10/2017 through 09/11/2018. Forms scanned.

## 2017-09-14 ENCOUNTER — TELEPHONE (OUTPATIENT)
Dept: FAMILY MEDICINE | Facility: OTHER | Age: 62
End: 2017-09-14

## 2017-09-14 NOTE — TELEPHONE ENCOUNTER
APPROVAL was received and documented in the patient's chart on 9/8/17.  Please see the telephone encounter of 9/8/17.  Thank you.  Dianna Monk, [facility] Specialist.

## 2017-09-14 NOTE — TELEPHONE ENCOUNTER
Called patient back to let her know approval for Crestor but no answer and voicemail is full unable to leave a message.

## 2017-09-14 NOTE — TELEPHONE ENCOUNTER
9:50 AM    Reason for Call: Phone Call    Description: Pt called and stated her ins needs a prior auth done on the Crestor. Wanting to know if we received this request from the pharmacy and what the status is? Please call her back at [phone]    Was an appointment offered for this call? No  If yes : Appointment type              Date    Preferred method for responding to this message: Telephone Call  What is your phone number ?    If we cannot reach you directly, may we leave a detailed response at the number you provided? Yes    Can this message wait until your PCP/provider returns, if available today? Not applicable    Milana Ornelas

## 2017-09-14 NOTE — TELEPHONE ENCOUNTER
This will be forwarded to the appropriate area who handles all of our PA's. I have not seen anything yet from pharmacy.

## 2017-09-14 NOTE — LETTER
Dear Diana,    We wanted to inform you that your prescription for Crestor has been approved.  Your pharmacy was also notified.       Thank you     Dr Mariela oDlan

## 2017-09-19 ENCOUNTER — TELEPHONE (OUTPATIENT)
Dept: FAMILY MEDICINE | Facility: OTHER | Age: 62
End: 2017-09-19

## 2017-09-19 NOTE — TELEPHONE ENCOUNTER
12:03 PM    Reason for Call: Phone Call    Description: Pt called regarding neurology referral. Stated she spoke with the [facility] and they need additional info in order to schedule pt. Please call them at [phone]. If you have any questions you can reach pt at [phone]    Was an appointment offered for this call? No  If yes : Appointment type              Date    Preferred method for responding to this message: Telephone Call  What is your phone number ?    If we cannot reach you directly, may we leave a detailed response at the number you provided? Yes    Can this message wait until your PCP/provider returns, if available today? Not applicable    Milana Ornelas

## 2017-09-20 NOTE — TELEPHONE ENCOUNTER
Writer called [facility] to provide further information for Neurology referral. Per [place] staff, the diagnosis of RLS must first go to their Sleep Lab unless the patient has other neurological disorders. Patient was notified of this and is agreeable to seeing Sleep Lab at the [facility]. She will contact them to let them know that she is okay to proceed with Sleep Lab.

## 2017-09-25 DIAGNOSIS — G25.81 RESTLESS LEGS SYNDROME (RLS): ICD-10-CM

## 2017-09-27 RX ORDER — PRAMIPEXOLE DIHYDROCHLORIDE 0.5 MG/1
TABLET ORAL
Qty: 90 TABLET | Refills: 5 | Status: SHIPPED | OUTPATIENT
Start: 2017-09-27 | End: 2018-05-17

## 2017-09-28 ENCOUNTER — TELEPHONE (OUTPATIENT)
Dept: FAMILY MEDICINE | Facility: OTHER | Age: 62
End: 2017-09-28

## 2017-09-28 NOTE — TELEPHONE ENCOUNTER
Diana would like you to call the [place] at [phone] regarding further information and which department the referral should be sent to.  Diana said she would like you to call her at [phone] to explain.  Thank you

## 2017-09-28 NOTE — TELEPHONE ENCOUNTER
See message below, this has been ongoing for a referral to see them, any idea where the referral stands? Could you contact the [facility] to see what else they need so she can get her appointment.

## 2017-10-02 NOTE — TELEPHONE ENCOUNTER
Please place referral order for [facility] for the indication of RLS as requested by [facility] staff. Thank you.

## 2017-10-02 NOTE — TELEPHONE ENCOUNTER
Writer called [facility] to see what it was they needed before they are able to get patient scheduled. Per [facility] staff, our clinic will need to send a new referral for Sleep Medicine and are not able to use the referral for Neurology as [facility] staff, [place], had previously informed writer. [facility] will send us a request with all of the necessary information for our staff to fill out. Writer attempted to contact patient, however, her voicemail is full and cannot accept new messages.

## 2017-11-07 DIAGNOSIS — M81.0 OSTEOPOROSIS: ICD-10-CM

## 2017-11-08 NOTE — TELEPHONE ENCOUNTER
Fosamax       Last Written Prescription Date: 6/1/2017  Last Fill Quantity: 12,  # refills: 0   Last Office Visit with G, [facility] or [facility] prescribing provider: 9/07/2017                                         Next 5 appointments (look out 90 days)     Dec 07, 2017  2:15 PM CST   (Arrive by 2:00 PM)   SHORT with Mariela Dolan MD   [facility] [place] ([facility] )    [address] [place] [address]   [phone]

## 2017-11-10 RX ORDER — ALENDRONATE SODIUM 70 MG/1
TABLET ORAL
Qty: 12 TABLET | Refills: 2 | Status: SHIPPED | OUTPATIENT
Start: 2017-11-10 | End: 2018-11-27

## 2018-07-26 DIAGNOSIS — G25.81 RESTLESS LEGS SYNDROME (RLS): ICD-10-CM

## 2018-07-26 NOTE — TELEPHONE ENCOUNTER
mirapex      Last Written Prescription Date:  6/22/18  Last Fill Quantity: 90,   # refills: 0  Last Office Visit: 1/29/18  Future Office visit:    Next 5 appointments (look out 90 days)     Aug 30, 2018  3:00 PM CDT   (Arrive by 2:45 PM)   PHYSICAL with Mariela Dolan MD   [facility] [place] ([facility] )    [address] [place] [address]   [phone]

## 2018-07-27 RX ORDER — PRAMIPEXOLE DIHYDROCHLORIDE 0.5 MG/1
TABLET ORAL
Qty: 90 TABLET | Refills: 0 | Status: SHIPPED | OUTPATIENT
Start: 2018-07-27 | End: 2018-09-05

## 2018-07-30 ENCOUNTER — TRANSFERRED RECORDS (OUTPATIENT)
Dept: HEALTH INFORMATION MANAGEMENT | Facility: CLINIC | Age: 63
End: 2018-07-30

## 2018-08-06 ENCOUNTER — MEDICAL CORRESPONDENCE (OUTPATIENT)
Dept: HEALTH INFORMATION MANAGEMENT | Facility: CLINIC | Age: 63
End: 2018-08-06

## 2018-08-16 NOTE — PROGRESS NOTES
SUBJECTIVE:   CC: Diana Ware is an 63 year old woman who presents for preventive health visit.     Healthy Habits:    Do you get at least three servings of calcium containing foods daily (dairy, green leafy vegetables, etc.)? yes    Amount of exercise or daily activities, outside of work: 3 day(s) per week    Problems taking medications regularly No    Medication side effects: No    Have you had an eye exam in the past two years? yes    Do you see a dentist twice per year? yes    Do you have sleep apnea, excessive snoring or daytime drowsiness?no      Chief Complaint   Patient presents with     Physical     Restless legs     severe, on medication. This runs in her family and she would like to be seen at [facility] sometime this year. Her sister has been seen by a neurologist there and she would like to see that physician. She would need a referral.          Today's PHQ-2 Score:   PHQ-2 ( 1999 [employer]) 6/5/2014   Q1: Little interest or pleasure in doing things 0   Q2: Feeling down, depressed or hopeless 0   PHQ-2 Score 0       Abuse: Current or Past(Physical, Sexual or Emotional)- No  Do you feel safe in your environment - Yes    Social History   Substance Use Topics     Smoking status: Never Smoker     Smokeless tobacco: Never Used     Alcohol use Yes      Comment: Socially     If you drink alcohol do you typically have >3 drinks per day or >7 drinks per week? No                     Reviewed orders with patient.  Reviewed health maintenance and updated orders accordingly - Yes  BP Readings from Last 3 Encounters:   08/29/18 128/60   09/07/17 138/84   08/08/17 106/72    Wt Readings from Last 3 Encounters:   08/29/18 165 lb (74.8 kg)   09/07/17 165 lb (74.8 kg)   08/08/17 165 lb (74.8 kg)                  Patient Active Problem List   Diagnosis     Advanced care planning/counseling discussion     Osteoporosis     Dysplastic Pap smear     Hyperlipidemia with target LDL less than 130     Migraine     Arthritis of knee,  left     Restless leg syndrome     Fracture of foot bone without toes, closed     Injury of right lateral plantar nerve     Plantar fasciitis     Corneal erosion     Past Surgical History:   Procedure Laterality Date     APPENDECTOMY  1967     ARTHROSCOPY KNEE  2000    Left knee; medial meniscal tear     ARTHROSCOPY KNEE  2010    RT     COLONOSCOPY  2004    benign polyp, repeat 2014     COLONOSCOPY N/A 10/17/2014    Procedure: COLONOSCOPY;  Surgeon: Renzo Kearney MD;  Location: [facility]     FRACTURE TX, WRIST RT/LT Left 01/2017    plate and screws placed     LAPAROSCOPY DIAGNOSTIC (GENERAL)  1992    adhesions     ORTHOPEDIC SURGERY  2017    ORIF of left distal radius using DVR Biomet Plate     SALPINGECTOMY  1967    left salpingectomy, left oophorectomy; Teratoma     STRESS TEST  2010    Chest pain; negative, in [place]     STRESS THALLIUM TEST  2003    Chest pain; negative     SURGICAL PATHOLOGY EXAM  05/23/2013    [facility] Biopsy, Galileo Franz Mayo for 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, no dysplasia noted on [facility] biopsy       Social History   Substance Use Topics     Smoking status: Never Smoker     Smokeless tobacco: Never Used     Alcohol use Yes      Comment: Socially     Family History   Problem Relation Age of Onset     HEART DISEASE Mother      HEART DISEASE Father      Cerebrovascular Disease Father      HEART DISEASE Maternal Grandmother      Diabetes Paternal Grandmother      Breast Cancer Maternal Aunt          Current Outpatient Prescriptions   Medication Sig Dispense Refill     alendronate (FOSAMAX) 70 MG tablet TAKE 1 TABLET BY MOUTH ONCE WEEKLY ON AN EMPTY STOMACH 12 tablet 2     Calcium Carbonate-Vit D-Min (CALCIUM 1200 PO)        IBUPROFEN PO Take 400 mg by mouth every 6 hours as needed for moderate pain       Multiple Vitamins-Minerals (WOMENS MULTIVITAMIN PLUS PO) Take by mouth daily       pramipexole (MIRAPEX) 0.5 MG tablet TAKE 1 OR 2 TABLETS BY MOUTH 2-3 HOURS BEFORE BEDTIME 90 tablet 0      rOPINIRole (REQUIP) 0.25 MG tablet Take 1 tablet (0.25 mg) by mouth At Bedtime 30 tablet 1     rosuvastatin (CRESTOR) 5 MG tablet Take 1 tablet (5 mg) by mouth daily 90 tablet 3     UNKNOWN TO PATIENT Topical ointment prescribed by Dr Kristian HERNANDEZ 13.9 % CREA APPLY TO AFFECTED AREAS TWICE A DAY 45 g 0     ZETIA 10 MG tablet TAKE 1 TABLET (10 MG) BY MOUTH DAILY 90 tablet 2     Allergies   Allergen Reactions     Simvastatin Other (See Comments)     Myalgias and elevated CPK           Pertinent mammograms are reviewed under the imaging tab.  History of abnormal Pap smear: remote past with last at least  5 years negative, repeat every 3 years  PAP / HPV Latest Ref Rng & Units 8/8/2017 8/2/2016 8/17/2015   PAP - NIL NIL NIL   HPV 16 DNA NEG:Negative Negative - -   HPV 18 DNA NEG:Negative Negative - -   OTHER HR HPV NEG:Negative Negative - -     Reviewed and updated as needed this visit by clinical staff         Reviewed and updated as needed this visit by Provider        Past Medical History:   Diagnosis Date     Hyperlipidaemia LDL goal < 130 09/11/2003     menopause 12/04/2001     Migraine headaches 08/31/2000     Moderate dysplasia of cervix (ELGIN II) 03/11/2013    colposcopy results of ASCUS pap with positive HPV, evaluated at [address], [facility] biopsy was recommended and done 5/2013     Osteoporosis 01/23/2003    noted on Dexa scan 2003     Papanicolaou smear of cervix with low grade squamous intraepithelial lesion (LGSIL) 03/23/2009    colposcopy revealed ELGIN I     Restless legs syndrome (RLS) 01/23/2012     Superficial injury of cornea 01/23/2012      Past Surgical History:   Procedure Laterality Date     APPENDECTOMY  1967     ARTHROSCOPY KNEE  2000    Left knee; medial meniscal tear     ARTHROSCOPY KNEE  2010    RT     COLONOSCOPY  2004    benign polyp, repeat 2014     COLONOSCOPY N/A 10/17/2014    Procedure: COLONOSCOPY;  Surgeon: Renzo Kearney MD;  Location: [facility]     FRACTURE TX, WRIST RT/LT Left 01/2017     plate and screws placed     LAPAROSCOPY DIAGNOSTIC (GENERAL)  1992    adhesions     ORTHOPEDIC SURGERY  2017    ORIF of left distal radius using DVR Biomet Plate     SALPINGECTOMY  1967    left salpingectomy, left oophorectomy; Teratoma     STRESS TEST  2010    Chest pain; negative, in [place]     STRESS THALLIUM TEST  2003    Chest pain; negative     SURGICAL PATHOLOGY EXAM  05/23/2013    [facility] Biopsy, Galileo Franz Mayo for 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, no dysplasia noted on [facility] biopsy     Obstetric History     No data available          ROS:  CONSTITUTIONAL: NEGATIVE for fever, chills, change in weight  INTEGUMENTARY/SKIN: NEGATIVE for worrisome rashes, moles or lesions  EYES: NEGATIVE for vision changes or irritation  ENT: NEGATIVE for ear, mouth and throat problems  RESP: NEGATIVE for significant cough or SOB  BREAST: NEGATIVE for masses, tenderness or discharge  CV: NEGATIVE for chest pain, palpitations or peripheral edema  GI: NEGATIVE for nausea, abdominal pain, heartburn, or change in bowel habits  : NEGATIVE for unusual urinary or vaginal symptoms. No vaginal bleeding.  MUSCULOSKELETAL: NEGATIVE for significant arthralgias or myalgia  NEURO: NEGATIVE for weakness, dizziness or paresthesias  ENDOCRINE: NEGATIVE for temperature intolerance, skin/hair changes  HEME/ALLERGY/IMMUNE: NEGATIVE for bleeding problems  PSYCHIATRIC: NEGATIVE for changes in mood or affect     OBJECTIVE:   There were no vitals taken for this visit.  EXAM:  GENERAL APPEARANCE: healthy, alert and no distress  EYES: Eyes grossly normal to inspection, PERRL and conjunctivae and sclerae normal  HENT: ear canals and TM's normal, nose and mouth without ulcers or lesions, oropharynx clear and oral mucous membranes moist  NECK: no adenopathy, no asymmetry, masses, or scars and thyroid normal to palpation  RESP: lungs clear to auscultation - no rales, rhonchi or wheezes  BREAST: normal without masses, tenderness or nipple discharge and no  palpable axillary masses or adenopathy  CV: regular rate and rhythm, normal S1 S2, no S3 or S4, no murmur, click or rub, no peripheral edema and peripheral pulses strong  ABDOMEN: soft, nontender, no hepatosplenomegaly, no masses and bowel sounds normal  MS: no musculoskeletal defects are noted and gait is age appropriate without ataxia  SKIN: no suspicious lesions or rashes  NEURO: Normal strength and tone, sensory exam grossly normal, mentation intact and speech normal  PSYCH: mentation appears normal and affect normal/bright    Results for orders placed or performed in visit on 08/29/18   MA Screen Bilateral w/Jada    Narrative    EXAM: MA SCREENING BILATERAL W/ JADA, 8/29/2018 1:46 PM    COMPARISONS: 8/2/2017 through 3/12/2012    HISTORY:63 years  Female  8-2-17 3D  ROUTINE; Visit for screening  mammogram    FINDINGS: No suspicious mass or microcalcification is seen in either  breast.    BREAST DENSITY: Scattered fibroglandular densities.      Impression    IMPRESSION: BI-RADS CATEGORY: 1 -  Negative.    RECOMMENDED FOLLOW-UP: Annual Mammography.      DIONNA MEYERS MD         ASSESSMENT/PLAN:   1. Routine general medical examination at a health care facility  Labs done earlier are reviewed with her  - PNEUMOCOCCAL CONJ VACCINE 13 VALENT IM  - ADMIN 1st VACCINE    2. Hyperlipidemia with target LDL less than 130  Lipids have improved significantly on medication. Continue and recheck in 1 year    3. Restless leg syndrome  She will send a letter at her request with the information for a referral . She doesn't want to proceed with this right away    4. Age-related osteoporosis without current pathological fracture  Continue fosomax      COUNSELING:   Reviewed preventive health counseling, as reflected in patient instructions       Regular exercise       Healthy diet/nutrition       Osteoporosis Prevention/Bone Health       Consider Hep C screening for patients born between 1945 and 1965, donates blood, declined    "    HIV screeninx in teen years, 1x in adult years, and at intervals if high risk, donates blood, declined    BP Readings from Last 1 Encounters:   17 138/84     Estimated body mass index is 26.63 kg/(m^2) as calculated from the following:    Height as of 17: 5' 6\" (1.676 m).    Weight as of 17: 165 lb (74.8 kg).           reports that she has never smoked. She has never used smokeless tobacco.      Counseling Resources:  ATP IV Guidelines  Pooled Cohorts Equation Calculator  Breast Cancer Risk Calculator  FRAX Risk Assessment  ICSI Preventive Guidelines  Dietary Guidelines for Americans, 2010  USDA's MyPlate  ASA Prophylaxis  Lung CA Screening    Mariela Dolan MD  [facility] [place]  "

## 2018-08-29 ENCOUNTER — RADIANT APPOINTMENT (OUTPATIENT)
Dept: MAMMOGRAPHY | Facility: OTHER | Age: 63
End: 2018-08-29
Attending: FAMILY MEDICINE
Payer: MEDICARE

## 2018-08-29 ENCOUNTER — OFFICE VISIT (OUTPATIENT)
Dept: FAMILY MEDICINE | Facility: OTHER | Age: 63
End: 2018-08-29
Attending: FAMILY MEDICINE
Payer: MEDICARE

## 2018-08-29 VITALS
HEART RATE: 77 BPM | OXYGEN SATURATION: 96 % | SYSTOLIC BLOOD PRESSURE: 128 MMHG | DIASTOLIC BLOOD PRESSURE: 60 MMHG | WEIGHT: 165 LBS | BODY MASS INDEX: 26.63 KG/M2

## 2018-08-29 DIAGNOSIS — G25.81 RESTLESS LEG SYNDROME: ICD-10-CM

## 2018-08-29 DIAGNOSIS — E78.5 HYPERLIPIDEMIA LDL GOAL <100: Primary | ICD-10-CM

## 2018-08-29 DIAGNOSIS — Z12.31 VISIT FOR SCREENING MAMMOGRAM: ICD-10-CM

## 2018-08-29 DIAGNOSIS — E78.5 HYPERLIPIDEMIA WITH TARGET LDL LESS THAN 130: ICD-10-CM

## 2018-08-29 DIAGNOSIS — M81.0 AGE-RELATED OSTEOPOROSIS WITHOUT CURRENT PATHOLOGICAL FRACTURE: ICD-10-CM

## 2018-08-29 DIAGNOSIS — Z00.00 ROUTINE GENERAL MEDICAL EXAMINATION AT A HEALTH CARE FACILITY: Primary | ICD-10-CM

## 2018-08-29 LAB
ALBUMIN SERPL-MCNC: 4.2 G/DL (ref 3.4–5)
ALP SERPL-CCNC: 67 U/L (ref 40–150)
ALT SERPL W P-5'-P-CCNC: 34 U/L (ref 0–50)
ANION GAP SERPL CALCULATED.3IONS-SCNC: 8 MMOL/L (ref 3–14)
AST SERPL W P-5'-P-CCNC: 17 U/L (ref 0–45)
BILIRUB SERPL-MCNC: 0.5 MG/DL (ref 0.2–1.3)
BUN SERPL-MCNC: 23 MG/DL (ref 7–30)
CALCIUM SERPL-MCNC: 8.6 MG/DL (ref 8.5–10.1)
CHLORIDE SERPL-SCNC: 105 MMOL/L (ref 94–109)
CHOLEST SERPL-MCNC: 170 MG/DL
CO2 SERPL-SCNC: 26 MMOL/L (ref 20–32)
CREAT SERPL-MCNC: 0.76 MG/DL (ref 0.52–1.04)
ERYTHROCYTE [DISTWIDTH] IN BLOOD BY AUTOMATED COUNT: 13.3 % (ref 10–15)
GFR SERPL CREATININE-BSD FRML MDRD: 77 ML/MIN/1.7M2
GLUCOSE SERPL-MCNC: 94 MG/DL (ref 70–99)
HCT VFR BLD AUTO: 44.1 % (ref 35–47)
HDLC SERPL-MCNC: 60 MG/DL
HGB BLD-MCNC: 14.7 G/DL (ref 11.7–15.7)
LDLC SERPL CALC-MCNC: 96 MG/DL
MCH RBC QN AUTO: 29.4 PG (ref 26.5–33)
MCHC RBC AUTO-ENTMCNC: 33.3 G/DL (ref 31.5–36.5)
MCV RBC AUTO: 88 FL (ref 78–100)
NONHDLC SERPL-MCNC: 110 MG/DL
PLATELET # BLD AUTO: 231 10E9/L (ref 150–450)
POTASSIUM SERPL-SCNC: 4.6 MMOL/L (ref 3.4–5.3)
PROT SERPL-MCNC: 7.6 G/DL (ref 6.8–8.8)
RBC # BLD AUTO: 5 10E12/L (ref 3.8–5.2)
SODIUM SERPL-SCNC: 139 MMOL/L (ref 133–144)
TRIGL SERPL-MCNC: 69 MG/DL
WBC # BLD AUTO: 5 10E9/L (ref 4–11)

## 2018-08-29 PROCEDURE — G0009 ADMIN PNEUMOCOCCAL VACCINE: HCPCS | Performed by: FAMILY MEDICINE

## 2018-08-29 PROCEDURE — 99397 PER PM REEVAL EST PAT 65+ YR: CPT | Performed by: FAMILY MEDICINE

## 2018-08-29 PROCEDURE — 80053 COMPREHEN METABOLIC PANEL: CPT | Mod: ZL | Performed by: FAMILY MEDICINE

## 2018-08-29 PROCEDURE — 85027 COMPLETE CBC AUTOMATED: CPT | Mod: ZL | Performed by: FAMILY MEDICINE

## 2018-08-29 PROCEDURE — 77067 SCR MAMMO BI INCL CAD: CPT | Mod: TC

## 2018-08-29 PROCEDURE — 80061 LIPID PANEL: CPT | Mod: ZL | Performed by: FAMILY MEDICINE

## 2018-08-29 PROCEDURE — 36415 COLL VENOUS BLD VENIPUNCTURE: CPT | Mod: ZL | Performed by: FAMILY MEDICINE

## 2018-08-29 PROCEDURE — 90670 PCV13 VACCINE IM: CPT | Performed by: FAMILY MEDICINE

## 2018-08-29 ASSESSMENT — PAIN SCALES - GENERAL: PAINLEVEL: NO PAIN (0)

## 2018-08-29 NOTE — MR AVS SNAPSHOT
After Visit Summary   8/29/2018    Diana Ware    MRN: [id number]           Patient Information     Date Of Birth          1955        Visit Information        Provider Department      8/29/2018 3:00 PM Mariela Dolan MD [facility]        Today's Diagnoses     Routine general medical examination at a health care facility    -  1    Hyperlipidemia with target LDL less than 130        Restless leg syndrome        Age-related osteoporosis without current pathological fracture          Care Instructions      Preventive Health Recommendations  Female Ages 65 +    Yearly exam:     See your health care provider every year in order to  o Review health changes.   o Discuss preventive care.    o Review your medicines if your doctor has prescribed any.      You no longer need a yearly Pap test unless you've had an abnormal Pap test in the past 10 years. If you have vaginal symptoms, such as bleeding or discharge, be sure to talk with your provider about a Pap test.      Every 1 to 2 years, have a mammogram.  If you are over 69, talk with your health care provider about whether or not you want to continue having screening mammograms.      Every 10 years, have a colonoscopy. Or, have a yearly FIT test (stool test). These exams will check for colon cancer.       Have a cholesterol test every 5 years, or more often if your doctor advises it.       Have a diabetes test (fasting glucose) every three years. If you are at risk for diabetes, you should have this test more often.       At age 65, have a bone density scan (DEXA) to check for osteoporosis (brittle bone disease).    Shots:    Get a flu shot each year.    Get a tetanus shot every 10 years.    Talk to your doctor about your pneumonia vaccines. There are now two you should receive - Pneumovax (PPSV 23) and Prevnar (PCV 13).    Talk to your pharmacist about the shingles vaccine.    Talk to your doctor about the hepatitis B  "vaccine.    Nutrition:     Eat at least 5 servings of fruits and vegetables each day.      Eat whole-grain bread, whole-wheat pasta and brown rice instead of white grains and rice.      Get adequate about Calcium and Vitamin D.     Lifestyle    Exercise at least 150 minutes a week (30 minutes a day, 5 days a week). This will help you control your weight and prevent disease.      Limit alcohol to one drink per day.      No smoking.       Wear sunscreen to prevent skin cancer.       See your dentist twice a year for an exam and cleaning.      See your eye doctor every 1 to 2 years to screen for conditions such as glaucoma, macular degeneration, cataracts, etc           Follow-ups after your visit        Who to contact     If you have questions or need follow up information about today's clinic visit or your schedule please contact [facility] directly at [phone].  Normal or non-critical lab and imaging results will be communicated to you by [employer], letter or phone within 4 business days after the clinic has received the results. If you do not hear from us within 7 days, please contact the clinic through [employer] or phone. If you have a critical or abnormal lab result, we will notify you by phone as soon as possible.  Submit refill requests through [employer] or call your pharmacy and they will forward the refill request to us. Please allow 3 business days for your refill to be completed.          Additional Information About Your Visit        Karma Information     Karma lets you send messages to your doctor, view your test results, renew your prescriptions, schedule appointments and more. To sign up, go to [email or web address] . Click on \"Log in\" on the left side of the screen, which will take you to the Welcome page. Then click on \"Sign up Now\" on the right side of the page.     You will be asked to enter the access code listed below, as well as some personal information. Please follow the " directions to create your username and password.     Your access code is: [email or web address]  Expires: 2018  4:45 PM     Your access code will  in 90 days. If you need help or a new code, please call your [facility] clinic or [phone].        Care EveryWhere ID     This is your Care EveryWhere ID. This could be used by other organizations to access your [facility] medical records  [id number]        Your Vitals Were     Pulse Pulse Oximetry Breastfeeding? BMI (Body Mass Index)          77 96% No 26.63 kg/m2         Blood Pressure from Last 3 Encounters:   18 128/60   17 138/84   17 106/72    Weight from Last 3 Encounters:   18 165 lb (74.8 kg)   17 165 lb (74.8 kg)   17 165 lb (74.8 kg)              We Performed the Following     ADMIN 1st VACCINE     PNEUMOCOCCAL CONJ VACCINE 13 VALENT IM        Primary Care Provider Office Phone # Fax #    Mariela Dolan [phone] [phone]        Equal Access to Services     [facility]: Hadii aad ku hadasho Soomaali, waaxda luqadaha, qaybta kaalmada adeestelayada, emelia phan . So [facility] [phone].    ATENCIÓN: Si habla español, tiene a beckman disposición servicios gratuitos de asistencia lingüística. Ll[facility] [phone].    We comply with applicable federal civil rights laws and [place] laws. We do not discriminate on the basis of race, color, national origin, age, disability, sex, sexual orientation, or gender identity.            Thank you!     Thank you for choosing [facility]  for your care. Our goal is always to provide you with excellent care. Hearing back from our patients is one way we can continue to improve our services. Please take a few minutes to complete the written survey that you may receive in the mail after your visit with us. Thank you!             Your Updated Medication List - Protect others around you: Learn how to  safely use, store and throw away your medicines at www.disposemymeds.org.          This list is accurate as of 8/29/18  4:45 PM.  Always use your most recent med list.                   Brand Name Dispense Instructions for use Diagnosis    alendronate 70 MG tablet    FOSAMAX    12 tablet    TAKE 1 TABLET BY MOUTH ONCE WEEKLY ON AN EMPTY STOMACH    Osteoporosis       CALCIUM 1200 PO           IBUPROFEN PO      Take 400 mg by mouth every 6 hours as needed for moderate pain        pramipexole 0.5 MG tablet    MIRAPEX    90 tablet    TAKE 1 OR 2 TABLETS BY MOUTH 2-3 HOURS BEFORE BEDTIME    Restless legs syndrome (RLS)       rOPINIRole 0.25 MG tablet    REQUIP    30 tablet    Take 1 tablet (0.25 mg) by mouth At Bedtime    Restless legs syndrome (RLS)       rosuvastatin 5 MG tablet    CRESTOR    90 tablet    Take 1 tablet (5 mg) by mouth daily    Hyperlipidemia LDL goal <130       UNKNOWN TO PATIENT      Topical ointment prescribed by Dr Kristian HERNANDEZ 13.9 % Crea   Generic drug:  eflornithine HCl     45 g    APPLY TO AFFECTED AREAS TWICE A DAY    Diagnosis unknown       WOMENS MULTIVITAMIN PLUS PO      Take by mouth daily        ZETIA 10 MG tablet   Generic drug:  ezetimibe     90 tablet    TAKE 1 TABLET (10 MG) BY MOUTH DAILY    Hyperlipidemia

## 2018-08-29 NOTE — NURSING NOTE
"Chief Complaint   Patient presents with     Physical       Initial /60  Pulse 77  Wt 165 lb (74.8 kg)  SpO2 96%  Breastfeeding? No  BMI 26.63 kg/m2 Estimated body mass index is 26.63 kg/(m^2) as calculated from the following:    Height as of 9/7/17: 5' 6\" (1.676 m).    Weight as of this encounter: 165 lb (74.8 kg).  Medication Reconciliation: complete    Moon Jalloh LPN    "

## 2018-09-05 DIAGNOSIS — G25.81 RESTLESS LEGS SYNDROME (RLS): ICD-10-CM

## 2018-09-05 NOTE — TELEPHONE ENCOUNTER
Mirapex      Last Written Prescription Date:  7/27/18  Last Fill Quantity: 90,   # refills: 0  Last Office Visit: 8/16/18  Future Office visit:

## 2018-09-07 RX ORDER — PRAMIPEXOLE DIHYDROCHLORIDE 0.5 MG/1
TABLET ORAL
Qty: 90 TABLET | Refills: 1 | Status: SHIPPED | OUTPATIENT
Start: 2018-09-07 | End: 2018-11-06

## 2018-10-02 ENCOUNTER — TELEPHONE (OUTPATIENT)
Dept: FAMILY MEDICINE | Facility: OTHER | Age: 63
End: 2018-10-02

## 2018-10-02 DIAGNOSIS — G25.81 RESTLESS LEG SYNDROME: Primary | ICD-10-CM

## 2018-10-02 NOTE — TELEPHONE ENCOUNTER
Letter came in mail from patient requesting referral to the [facility] for evaluation for her RLS. She wants to see Isa Nolen Department of Neurology and sleep medicine in [place].  Please advise.

## 2018-10-24 ENCOUNTER — TELEPHONE (OUTPATIENT)
Dept: FAMILY MEDICINE | Facility: OTHER | Age: 63
End: 2018-10-24

## 2018-10-24 NOTE — TELEPHONE ENCOUNTER
10:32 AM    Reason for Call: Phone Call    Description: Pt would like a call back [facility]. Declined to give any further information.    Was an appointment offered for this call? No  If yes : Appointment type              Date    Preferred method for responding to this message: Telephone Call  What is your phone number ? [phone]    If we cannot reach you directly, may we leave a detailed response at the number you provided? Yes    Can this message wait until your PCP/provider returns, if available today? YES, pt is aware Dr. KIMBERLY Dolan is out today. If nurse can't call until tomorrow she said its ok.    Parul Xiong

## 2018-10-25 NOTE — TELEPHONE ENCOUNTER
Pt returned call and was wondering if we had faxed referral to [place] in regards to neurology referral. I did note this was done on 10/5/18 pt is aware and will be awaiting a call.

## 2018-10-26 ENCOUNTER — TELEPHONE (OUTPATIENT)
Dept: FAMILY MEDICINE | Facility: OTHER | Age: 63
End: 2018-10-26

## 2018-10-26 NOTE — TELEPHONE ENCOUNTER
12:37 PM    Reason for Call: Phone Call    Description: Pt called and stated she spoke with the [facility] and they need more info regarding her referral to Neurology. They would like Dr KIMBERLY Dolan to call them directly at [phone]. If you have any questions you can reach pt back at [phone]    Was an appointment offered for this call? No  If yes : Appointment type              Date    Preferred method for responding to this message: Telephone Call  What is your phone number ?    If we cannot reach you directly, may we leave a detailed response at the number you provided? Yes    Can this message wait until your PCP/provider returns, if available today? YES, aware provider out today    Milana Ornelas

## 2018-11-05 ENCOUNTER — TRANSFERRED RECORDS (OUTPATIENT)
Dept: HEALTH INFORMATION MANAGEMENT | Facility: CLINIC | Age: 63
End: 2018-11-05

## 2018-11-12 DIAGNOSIS — R69 DIAGNOSIS UNKNOWN: ICD-10-CM

## 2018-11-13 RX ORDER — EFLORNITHINE HYDROCHLORIDE 139 MG/G
CREAM TOPICAL
Qty: 45 G | Refills: 3 | Status: SHIPPED | OUTPATIENT
Start: 2018-11-13 | End: 2018-11-14

## 2018-11-13 NOTE — TELEPHONE ENCOUNTER
VANIQA 13.9% TOPICAL CREAM      Last Written Prescription Date:  3/7/16  Last Fill Quantity: 45g,   # refills: 0  Last Office Visit: 8/29/18  Future Office visit:

## 2018-11-14 ENCOUNTER — TELEPHONE (OUTPATIENT)
Dept: FAMILY MEDICINE | Facility: OTHER | Age: 63
End: 2018-11-14

## 2018-11-14 DIAGNOSIS — L67.8 ABNORMAL FACIAL HAIR: Primary | ICD-10-CM

## 2018-11-14 DIAGNOSIS — R69 DIAGNOSIS UNKNOWN: ICD-10-CM

## 2018-11-14 NOTE — TELEPHONE ENCOUNTER
"11/14/2018 Received a PA through [facility] for Vaniqa. I noticed that the diagnosis attached to that med is \"Diagnosis unknown\". Can this be fixed so that it has a different code that would possible help get this medication approved? Thank you!  "

## 2018-11-15 NOTE — TELEPHONE ENCOUNTER
11/15/2018 Not EPA enabled, called and had a form faxed to me. Brought up for signature. Will fax in once I get it signed.

## 2018-11-27 DIAGNOSIS — L67.8 ABNORMAL FACIAL HAIR: ICD-10-CM

## 2018-11-27 NOTE — TELEPHONE ENCOUNTER
vaniqa cream       Last Written Prescription Date:  11/14/18  Last Fill Quantity: 45g,   # refills: 3  Last Office Visit: 8/29/18  Future Office visit:       Routing refill request to provider for review/approval because:  Patient states that medication will need a PA

## 2018-11-28 NOTE — TELEPHONE ENCOUNTER
Please see the telephone encounter of 11/14/2018. I did receive a PA and it was already denied. Thank you.

## 2018-12-18 DIAGNOSIS — E78.5 HYPERLIPIDEMIA LDL GOAL <130: ICD-10-CM

## 2018-12-18 NOTE — PROGRESS NOTES
[facility] [place]  [address]  [place] [address]  [phone]  Dept: [phone]    PRE-OP EVALUATION:  Today's date: 2018    Diana Ware (: 1955) presents for pre-operative evaluation assessment as requested by Dr. Edwards.  She requires evaluation and anesthesia risk assessment prior to undergoing surgery/procedure for treatment of knee pain .    Proposed Surgery/ Procedure: Left Total Knee Arthroplasty  Date of Surgery/ Procedure: 2019  Time of Surgery/ Procedure: [facility]  Hospital/Surgical Facility: [facility]    Primary Physician: Mariela Dolan  Type of Anesthesia Anticipated: to be determined    Patient has a Health Care Directive or Living Will:  [place] [facility] [place]    1. NO - Do you have a history of heart attack, stroke, stent, bypass or surgery on an artery in the head, neck, heart or legs?  2. NO - Do you ever have any pain or discomfort in your chest?  3. NO - Do you have a history of  Heart Failure?  4. NO - Are you troubled by shortness of breath when: walking on the level, up a slight hill or at night?  5. NO - Do you currently have a cold, bronchitis or other respiratory infection?  6. NO - Do you have a cough, shortness of breath or wheezing?  7. NO - Do you sometimes get pains in the calves of your legs when you walk?  8. YES - DO YOU OR ANYONE IN YOUR FAMILY HAVE PREVIOUS HISTORY OF BLOOD CLOTS? Patient's Father  9. NO - Do you or does anyone in your family have a serious bleeding problem such as prolonged bleeding following surgeries or cuts?  10. NO - Have you ever had problems with anemia or been told to take iron pills?  11. NO - Have you had any abnormal blood loss such as black, tarry or bloody stools, or abnormal vaginal bleeding?  12. NO - Have you ever had a blood transfusion?  13. NO - Have you or any of your relatives ever had problems with anesthesia?  14. NO - Do you have sleep apnea, excessive snoring or daytime drowsiness?  15. NO - Do you have  any prosthetic heart valves?  16. NO - Do you have prosthetic joints?  17. NO - Is there any chance that you may be pregnant?      HPI:     HPI related to upcoming procedure: end stage osteoarthritis of the left knee           MEDICAL HISTORY:     Patient Active Problem List    Diagnosis Date Noted     Arthritis of knee, left 08/08/2017     Priority: Medium     Restless leg syndrome 08/08/2017     Priority: Medium     Dysplastic Pap smear 11/26/2013     Priority: Medium     Severe dysplasia on biopsy at 12:00, moderate dysplasia from biopsy at 6:00, at colposcopy, conization at [place], May 22, 2013 done, revealed no dysplasia.       Advanced care planning/counseling discussion 01/24/2013     Priority: Medium     Injury of right lateral plantar nerve 09/25/2012     Priority: Medium     Plantar fasciitis 05/30/2012     Priority: Medium     Corneal erosion 04/13/2012     Priority: Medium     Overview:   IMO Update 10/11       Fracture of foot bone without toes, closed 01/28/2009     Priority: Medium     Overview:   IMO Update 10/11       Hyperlipidemia with target LDL less than 130 09/11/2003     Priority: Medium     Diagnosis updated by automated process. Provider to review and confirm.       Osteoporosis 01/23/2003     Priority: Medium     noted on Dexa scan 2003  Problem list name updated by automated process. Provider to review       Migraine 08/31/2000     Priority: Medium     Problem list name updated by automated process. Provider to review        Past Medical History:   Diagnosis Date     Hyperlipidaemia LDL goal < 130 09/11/2003     menopause 12/04/2001     Migraine headaches 08/31/2000     Moderate dysplasia of cervix (ELGIN II) 03/11/2013    colposcopy results of ASCUS pap with positive HPV, evaluated at [place], [facility] biopsy was recommended and done 5/2013     Osteoporosis 01/23/2003    noted on Dexa scan 2003     Papanicolaou smear of cervix with low grade squamous intraepithelial lesion (LGSIL) 03/23/2009     colposcopy revealed ELGIN I     Restless legs syndrome (RLS) 01/23/2012     Superficial injury of cornea 01/23/2012     Past Surgical History:   Procedure Laterality Date     APPENDECTOMY  1967     ARTHROSCOPY KNEE  2000    Left knee; medial meniscal tear     ARTHROSCOPY KNEE  2010    RT     COLONOSCOPY  2004    benign polyp, repeat 2014     COLONOSCOPY N/A 10/17/2014    Procedure: COLONOSCOPY;  Surgeon: Renzo Kearney MD;  Location: [facility]     FRACTURE TX, WRIST RT/LT Left 01/2017    plate and screws placed     LAPAROSCOPY DIAGNOSTIC (GENERAL)  1992    adhesions     ORTHOPEDIC SURGERY  2017    ORIF of left distal radius using DVR Biomet Plate     SALPINGECTOMY  1967    left salpingectomy, left oophorectomy; Teratoma     STRESS TEST  2010    Chest pain; negative, in [place]     STRESS THALLIUM TEST  2003    Chest pain; negative     SURGICAL PATHOLOGY EXAM  05/23/2013    [facility] Biopsy, Galileo Franz Mayo for 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, no dysplasia noted on [facility] biopsy     Current Outpatient Medications   Medication Sig Dispense Refill     alendronate (FOSAMAX) 70 MG tablet TAKE 1 TABLET BY MOUTH ONCE WEEKLY ON AN EMPTY STOMACH 12 tablet 2     Calcium Carbonate-Vit D-Min (CALCIUM 1200 PO)        eflornithine HCl (VANIQA) 13.9 % CREA APPLY THIN LAYER TO AFFECTED AREA 2 TIMES DAILY 45 g 3     IBUPROFEN PO Take 400 mg by mouth every 6 hours as needed for moderate pain       Multiple Vitamins-Minerals (WOMENS MULTIVITAMIN PLUS PO) Take by mouth daily       pramipexole (MIRAPEX) 0.5 MG tablet TAKE 1 OR 2 TABLETS BY MOUTH 2-3 HOURS BEFORE BEDTIME 90 tablet 2     rosuvastatin (CRESTOR) 5 MG tablet TAKE 1 TABLET BY MOUTH EVERY DAY 30 tablet 0     UNKNOWN TO PATIENT Topical ointment prescribed by Dr Townsend       OTC products: Aspirin    Allergies   Allergen Reactions     Simvastatin Other (See Comments)     Myalgias and elevated CPK      Latex Allergy: NO    Social History     Tobacco Use     Smoking status: Never Smoker      Smokeless tobacco: Never Used   Substance Use Topics     Alcohol use: Yes     Comment: Socially     History   Drug Use No       REVIEW OF SYSTEMS:   CONSTITUTIONAL: NEGATIVE for fever, chills, change in weight  INTEGUMENTARY/SKIN: NEGATIVE for worrisome rashes, moles or lesions  EYES: NEGATIVE for vision changes or irritation  ENT/MOUTH: NEGATIVE for ear, mouth and throat problems  RESP: NEGATIVE for significant cough or SOB  BREAST: NEGATIVE for masses, tenderness or discharge  CV: NEGATIVE for chest pain, palpitations or peripheral edema  GI: NEGATIVE for nausea, abdominal pain, heartburn, or change in bowel habits  : NEGATIVE for frequency, dysuria, or hematuria  MUSCULOSKELETAL: NEGATIVE for significant arthralgias or myalgia  NEURO: NEGATIVE for weakness, dizziness or paresthesias  ENDOCRINE: NEGATIVE for temperature intolerance, skin/hair changes  HEME: NEGATIVE for bleeding problems  PSYCHIATRIC: NEGATIVE for changes in mood or affect    EXAM:   BP (!) 146/96 (BP Location: Right arm, Patient Position: Sitting, Cuff Size: Adult Regular)   Pulse 77   Temp 98.4  F (36.9  C) (Tympanic)   Wt 80.7 kg (178 lb)   SpO2 97%   BMI 28.73 kg/m      GENERAL APPEARANCE: healthy, alert and no distress     EYES: EOMI, PERRL     HENT: ear canals and TM's normal and nose and mouth without ulcers or lesions     NECK: no adenopathy, no asymmetry, masses, or scars and thyroid normal to palpation     RESP: lungs clear to auscultation - no rales, rhonchi or wheezes     CV: regular rates and rhythm, normal S1 S2, no S3 or S4 and no murmur, click or rub     ABDOMEN:  soft, nontender, no HSM or masses and bowel sounds normal     MS: extremities normal- no gross deformities noted, no evidence of inflammation in joints, FROM in all extremities.     SKIN: no suspicious lesions or rashes     NEURO: Normal strength and tone, sensory exam grossly normal, mentation intact and speech normal     PSYCH: mentation appears normal. and  affect normal/bright     LYMPHATICS: No cervical adenopathy    DIAGNOSTICS:     EKG: appears normal, NSR, normal axis, normal intervals, no acute ST/T changes c/w ischemia, no LVH by voltage criteria  Labs Resulted Today:   Results for orders placed or performed in visit on 12/24/18   CBC with platelets and differential   Result Value Ref Range    WBC 6.0 4.0 - 11.0 10e9/L    RBC Count 4.90 3.8 - 5.2 10e12/L    Hemoglobin 14.6 11.7 - 15.7 g/dL    Hematocrit 44.3 35.0 - 47.0 %    MCV 90 78 - 100 fl    MCH 29.8 26.5 - 33.0 pg    MCHC 33.0 31.5 - 36.5 g/dL    RDW 13.6 10.0 - 15.0 %    Platelet Count 223 150 - 450 10e9/L    Diff Method Automated Method     % Neutrophils 59.1 %    % Lymphocytes 29.8 %    % Monocytes 8.5 %    % Eosinophils 1.3 %    % Basophils 0.8 %    % Immature Granulocytes 0.5 %    Nucleated RBCs 0 0 /100    Absolute Neutrophil 3.5 1.6 - 8.3 10e9/L    Absolute Lymphocytes 1.8 0.8 - 5.3 10e9/L    Absolute Monocytes 0.5 0.0 - 1.3 10e9/L    Absolute Eosinophils 0.1 0.0 - 0.7 10e9/L    Absolute Basophils 0.1 0.0 - 0.2 10e9/L    Abs Immature Granulocytes 0.0 0 - 0.4 10e9/L    Absolute Nucleated RBC 0.0    Comprehensive metabolic panel   Result Value Ref Range    Sodium 139 133 - 144 mmol/L    Potassium 4.4 3.4 - 5.3 mmol/L    Chloride 107 94 - 109 mmol/L    Carbon Dioxide 27 20 - 32 mmol/L    Anion Gap 5 3 - 14 mmol/L    Glucose 89 70 - 99 mg/dL    Urea Nitrogen 19 7 - 30 mg/dL    Creatinine 0.69 0.52 - 1.04 mg/dL    GFR Estimate >90 >60 mL/min/[1.73_m2]    GFR Estimate If Black >90 >60 mL/min/[1.73_m2]    Calcium 8.7 8.5 - 10.1 mg/dL    Bilirubin Total 0.4 0.2 - 1.3 mg/dL    Albumin 4.0 3.4 - 5.0 g/dL    Protein Total 7.3 6.8 - 8.8 g/dL    Alkaline Phosphatase 59 40 - 150 U/L    ALT 30 0 - 50 U/L    AST 15 0 - 45 U/L   UA reflex to Microscopic and Culture   Result Value Ref Range    Color Urine Light Yellow     Appearance Urine Clear     Glucose Urine Negative NEG^Negative mg/dL    Bilirubin Urine Negative  NEG^Negative    Ketones Urine Negative NEG^Negative mg/dL    Specific Gravity Urine 1.016 1.003 - 1.035    Blood Urine Trace (A) NEG^Negative    pH Urine 6.0 4.7 - 8.0 pH    Protein Albumin Urine Negative NEG^Negative mg/dL    Urobilinogen mg/dL Normal 0.0 - 2.0 mg/dL    Nitrite Urine Negative NEG^Negative    Leukocyte Esterase Urine Negative NEG^Negative    Source Midstream Urine     RBC Urine 2 0 - 2 /HPF    WBC Urine <1 0 - 5 /HPF    Bacteria Urine None (A) NEG^Negative /HPF    Mucous Urine Present (A) NEG^Negative /LPF   ESR: Erythrocyte sedimentation rate   Result Value Ref Range    Sed Rate 7 0 - 30 mm/h       Recent Labs   Lab Test 08/29/18  1040 08/17/14 2020   HGB 14.7 14.2    196    139   POTASSIUM 4.6 3.7   CR 0.76 0.63        IMPRESSION:   Reason for surgery/procedure: end stage osteoarthritis of the left knee       The proposed surgical procedure is considered INTERMEDIATE risk.    REVISED CARDIAC RISK INDEX  The patient has the following serious cardiovascular risks for perioperative complications such as (MI, PE, VFib and 3  AV Block):  No serious cardiac risks  INTERPRETATION: 0 risks: Class I (very low risk - 0.4% complication rate)    The patient has the following additional risks for perioperative complications:  No identified additional risks      ICD-10-CM    1. Preop general physical exam Z01.818 CBC with platelets and differential     Comprehensive metabolic panel     UA reflex to Microscopic and Culture     EKG 12-lead complete w/read - (Clinic Performed)     ESR: Erythrocyte sedimentation rate   2. Arthritis of knee M17.10    3. Benign essential hypertension I10 lisinopril (PRINIVIL/ZESTRIL) 5 MG tablet  Will start this today as she is high, recheck in 2 weeks       RECOMMENDATIONS:       Cardiovascular Risk  Performs 4 METs exercise without symptoms (Light housework (dusting, washing dishes) and Climb a flight of stairs) .       --Patient is to take Lisinopril and Mirapex  on  the day of surgery EXCEPT for modifications listed below.    APPROVAL GIVEN to proceed with proposed procedure, will recheck BP in 2 weeks.       1.6.2019  BP is improved on Lisinopril 5 mg daily. Will increase to 10 mg daily. She is cleared for surgery    Signed Electronically by: Mariela Dolan MD    Copy of this evaluation report is provided to requesting physician.    [place] Preop Guidelines    Revised Cardiac Risk Index

## 2018-12-19 RX ORDER — ROSUVASTATIN CALCIUM 5 MG/1
TABLET, COATED ORAL
Qty: 30 TABLET | Refills: 0 | Status: SHIPPED | OUTPATIENT
Start: 2018-12-19 | End: 2019-01-18

## 2018-12-24 ENCOUNTER — OFFICE VISIT (OUTPATIENT)
Dept: FAMILY MEDICINE | Facility: OTHER | Age: 63
End: 2018-12-24
Attending: FAMILY MEDICINE
Payer: MEDICARE

## 2018-12-24 VITALS
SYSTOLIC BLOOD PRESSURE: 142 MMHG | WEIGHT: 178 LBS | DIASTOLIC BLOOD PRESSURE: 92 MMHG | OXYGEN SATURATION: 97 % | TEMPERATURE: 98.4 F | BODY MASS INDEX: 28.73 KG/M2 | HEART RATE: 77 BPM

## 2018-12-24 DIAGNOSIS — I10 BENIGN ESSENTIAL HYPERTENSION: ICD-10-CM

## 2018-12-24 DIAGNOSIS — M17.10 ARTHRITIS OF KNEE: ICD-10-CM

## 2018-12-24 DIAGNOSIS — Z01.818 PREOP GENERAL PHYSICAL EXAM: Primary | ICD-10-CM

## 2018-12-24 LAB
ALBUMIN SERPL-MCNC: 4 G/DL (ref 3.4–5)
ALBUMIN UR-MCNC: NEGATIVE MG/DL
ALP SERPL-CCNC: 59 U/L (ref 40–150)
ALT SERPL W P-5'-P-CCNC: 30 U/L (ref 0–50)
ANION GAP SERPL CALCULATED.3IONS-SCNC: 5 MMOL/L (ref 3–14)
APPEARANCE UR: CLEAR
AST SERPL W P-5'-P-CCNC: 15 U/L (ref 0–45)
BACTERIA #/AREA URNS HPF: ABNORMAL /HPF
BASOPHILS # BLD AUTO: 0.1 10E9/L (ref 0–0.2)
BASOPHILS NFR BLD AUTO: 0.8 %
BILIRUB SERPL-MCNC: 0.4 MG/DL (ref 0.2–1.3)
BILIRUB UR QL STRIP: NEGATIVE
BUN SERPL-MCNC: 19 MG/DL (ref 7–30)
CALCIUM SERPL-MCNC: 8.7 MG/DL (ref 8.5–10.1)
CHLORIDE SERPL-SCNC: 107 MMOL/L (ref 94–109)
CO2 SERPL-SCNC: 27 MMOL/L (ref 20–32)
COLOR UR AUTO: ABNORMAL
CREAT SERPL-MCNC: 0.69 MG/DL (ref 0.52–1.04)
DIFFERENTIAL METHOD BLD: NORMAL
EOSINOPHIL # BLD AUTO: 0.1 10E9/L (ref 0–0.7)
EOSINOPHIL NFR BLD AUTO: 1.3 %
ERYTHROCYTE [DISTWIDTH] IN BLOOD BY AUTOMATED COUNT: 13.6 % (ref 10–15)
ERYTHROCYTE [SEDIMENTATION RATE] IN BLOOD BY WESTERGREN METHOD: 7 MM/H (ref 0–30)
GFR SERPL CREATININE-BSD FRML MDRD: >90 ML/MIN/{1.73_M2}
GLUCOSE SERPL-MCNC: 89 MG/DL (ref 70–99)
GLUCOSE UR STRIP-MCNC: NEGATIVE MG/DL
HCT VFR BLD AUTO: 44.3 % (ref 35–47)
HGB BLD-MCNC: 14.6 G/DL (ref 11.7–15.7)
HGB UR QL STRIP: ABNORMAL
IMM GRANULOCYTES # BLD: 0 10E9/L (ref 0–0.4)
IMM GRANULOCYTES NFR BLD: 0.5 %
KETONES UR STRIP-MCNC: NEGATIVE MG/DL
LEUKOCYTE ESTERASE UR QL STRIP: NEGATIVE
LYMPHOCYTES # BLD AUTO: 1.8 10E9/L (ref 0.8–5.3)
LYMPHOCYTES NFR BLD AUTO: 29.8 %
MCH RBC QN AUTO: 29.8 PG (ref 26.5–33)
MCHC RBC AUTO-ENTMCNC: 33 G/DL (ref 31.5–36.5)
MCV RBC AUTO: 90 FL (ref 78–100)
MONOCYTES # BLD AUTO: 0.5 10E9/L (ref 0–1.3)
MONOCYTES NFR BLD AUTO: 8.5 %
MUCOUS THREADS #/AREA URNS LPF: PRESENT /LPF
NEUTROPHILS # BLD AUTO: 3.5 10E9/L (ref 1.6–8.3)
NEUTROPHILS NFR BLD AUTO: 59.1 %
NITRATE UR QL: NEGATIVE
NRBC # BLD AUTO: 0 10*3/UL
NRBC BLD AUTO-RTO: 0 /100
PH UR STRIP: 6 PH (ref 4.7–8)
PLATELET # BLD AUTO: 223 10E9/L (ref 150–450)
POTASSIUM SERPL-SCNC: 4.4 MMOL/L (ref 3.4–5.3)
PROT SERPL-MCNC: 7.3 G/DL (ref 6.8–8.8)
RBC # BLD AUTO: 4.9 10E12/L (ref 3.8–5.2)
RBC #/AREA URNS AUTO: 2 /HPF (ref 0–2)
SODIUM SERPL-SCNC: 139 MMOL/L (ref 133–144)
SOURCE: ABNORMAL
SP GR UR STRIP: 1.02 (ref 1–1.03)
UROBILINOGEN UR STRIP-MCNC: NORMAL MG/DL (ref 0–2)
WBC # BLD AUTO: 6 10E9/L (ref 4–11)
WBC #/AREA URNS AUTO: <1 /HPF (ref 0–5)

## 2018-12-24 PROCEDURE — 93010 ELECTROCARDIOGRAM REPORT: CPT | Performed by: INTERNAL MEDICINE

## 2018-12-24 PROCEDURE — 85652 RBC SED RATE AUTOMATED: CPT | Mod: ZL | Performed by: FAMILY MEDICINE

## 2018-12-24 PROCEDURE — 93005 ELECTROCARDIOGRAM TRACING: CPT

## 2018-12-24 PROCEDURE — 99214 OFFICE O/P EST MOD 30 MIN: CPT | Mod: 25 | Performed by: FAMILY MEDICINE

## 2018-12-24 PROCEDURE — 80053 COMPREHEN METABOLIC PANEL: CPT | Mod: ZL | Performed by: FAMILY MEDICINE

## 2018-12-24 PROCEDURE — 85025 COMPLETE CBC W/AUTO DIFF WBC: CPT | Mod: ZL | Performed by: FAMILY MEDICINE

## 2018-12-24 PROCEDURE — G0463 HOSPITAL OUTPT CLINIC VISIT: HCPCS

## 2018-12-24 PROCEDURE — 36415 COLL VENOUS BLD VENIPUNCTURE: CPT | Mod: ZL | Performed by: FAMILY MEDICINE

## 2018-12-24 PROCEDURE — 81001 URINALYSIS AUTO W/SCOPE: CPT | Mod: ZL | Performed by: FAMILY MEDICINE

## 2018-12-24 RX ORDER — LISINOPRIL 5 MG/1
5 TABLET ORAL DAILY
Qty: 30 TABLET | Refills: 3 | Status: SHIPPED | OUTPATIENT
Start: 2018-12-24 | End: 2019-01-07

## 2018-12-24 ASSESSMENT — PATIENT HEALTH QUESTIONNAIRE - PHQ9
5. POOR APPETITE OR OVEREATING: NOT AT ALL
SUM OF ALL RESPONSES TO PHQ QUESTIONS 1-9: 3

## 2018-12-24 ASSESSMENT — ANXIETY QUESTIONNAIRES
GAD7 TOTAL SCORE: 0
3. WORRYING TOO MUCH ABOUT DIFFERENT THINGS: NOT AT ALL
2. NOT BEING ABLE TO STOP OR CONTROL WORRYING: NOT AT ALL
7. FEELING AFRAID AS IF SOMETHING AWFUL MIGHT HAPPEN: NOT AT ALL
1. FEELING NERVOUS, ANXIOUS, OR ON EDGE: NOT AT ALL
6. BECOMING EASILY ANNOYED OR IRRITABLE: NOT AT ALL
5. BEING SO RESTLESS THAT IT IS HARD TO SIT STILL: NOT AT ALL

## 2018-12-24 ASSESSMENT — PAIN SCALES - GENERAL: PAINLEVEL: NO PAIN (0)

## 2018-12-24 NOTE — NURSING NOTE
"Chief Complaint   Patient presents with     Pre-Op Exam       Initial BP (!) 146/96 (BP Location: Right arm, Patient Position: Sitting, Cuff Size: Adult Regular)   Pulse 77   Temp 98.4  F (36.9  C) (Tympanic)   Wt 80.7 kg (178 lb)   SpO2 97%   BMI 28.73 kg/m   Estimated body mass index is 28.73 kg/m  as calculated from the following:    Height as of 9/7/17: 1.676 m (5' 6\").    Weight as of this encounter: 80.7 kg (178 lb).  Medication Reconciliation: complete    Destini Kearney LPN  "

## 2018-12-24 NOTE — PATIENT INSTRUCTIONS
Hold medications the morning of surgery except take lisinopril the morning of surgery, and mirapex with a small amount of water  Nothing to eat or drink after midnight the night prior to surgery

## 2018-12-24 NOTE — LETTER
December 28, 2018      Diana Ware  [address] [place] [address]        Dear ,    We are writing to inform you of your test results.    Your test results fall within the expected range(s) or remain unchanged from previous results.  Please continue with current treatment plan.    Resulted Orders   CBC with platelets and differential   Result Value Ref Range    WBC 6.0 4.0 - 11.0 10e9/L    RBC Count 4.90 3.8 - 5.2 10e12/L    Hemoglobin 14.6 11.7 - 15.7 g/dL    Hematocrit 44.3 35.0 - 47.0 %    MCV 90 78 - 100 fl    MCH 29.8 26.5 - 33.0 pg    MCHC 33.0 31.5 - 36.5 g/dL    RDW 13.6 10.0 - 15.0 %    Platelet Count 223 150 - 450 10e9/L    Diff Method Automated Method     % Neutrophils 59.1 %    % Lymphocytes 29.8 %    % Monocytes 8.5 %    % Eosinophils 1.3 %    % Basophils 0.8 %    % Immature Granulocytes 0.5 %    Nucleated RBCs 0 0 /100    Absolute Neutrophil 3.5 1.6 - 8.3 10e9/L    Absolute Lymphocytes 1.8 0.8 - 5.3 10e9/L    Absolute Monocytes 0.5 0.0 - 1.3 10e9/L    Absolute Eosinophils 0.1 0.0 - 0.7 10e9/L    Absolute Basophils 0.1 0.0 - 0.2 10e9/L    Abs Immature Granulocytes 0.0 0 - 0.4 10e9/L    Absolute Nucleated RBC 0.0    Comprehensive metabolic panel   Result Value Ref Range    Sodium 139 133 - 144 mmol/L    Potassium 4.4 3.4 - 5.3 mmol/L    Chloride 107 94 - 109 mmol/L    Carbon Dioxide 27 20 - 32 mmol/L    Anion Gap 5 3 - 14 mmol/L    Glucose 89 70 - 99 mg/dL    Urea Nitrogen 19 7 - 30 mg/dL    Creatinine 0.69 0.52 - 1.04 mg/dL    GFR Estimate >90 >60 mL/min/[1.73_m2]      Comment:      Non  GFR Calc  Starting 12/18/2018, serum creatinine based estimated GFR (eGFR) will be   calculated using the Chronic Kidney Disease Epidemiology Collaboration   (CKD-EPI) equation.      GFR Estimate If Black >90 >60 mL/min/[1.73_m2]      Comment:       GFR Calc  Starting 12/18/2018, serum creatinine based estimated GFR (eGFR) will be   calculated using the Chronic Kidney Disease  Epidemiology Collaboration   (CKD-EPI) equation.      Calcium 8.7 8.5 - 10.1 mg/dL    Bilirubin Total 0.4 0.2 - 1.3 mg/dL    Albumin 4.0 3.4 - 5.0 g/dL    Protein Total 7.3 6.8 - 8.8 g/dL    Alkaline Phosphatase 59 40 - 150 U/L    ALT 30 0 - 50 U/L    AST 15 0 - 45 U/L   UA reflex to Microscopic and Culture   Result Value Ref Range    Color Urine Light Yellow     Appearance Urine Clear     Glucose Urine Negative NEG^Negative mg/dL    Bilirubin Urine Negative NEG^Negative    Ketones Urine Negative NEG^Negative mg/dL    Specific Gravity Urine 1.016 1.003 - 1.035    Blood Urine Trace (A) NEG^Negative    pH Urine 6.0 4.7 - 8.0 pH    Protein Albumin Urine Negative NEG^Negative mg/dL    Urobilinogen mg/dL Normal 0.0 - 2.0 mg/dL    Nitrite Urine Negative NEG^Negative    Leukocyte Esterase Urine Negative NEG^Negative    Source Midstream Urine     RBC Urine 2 0 - 2 /HPF    WBC Urine <1 0 - 5 /HPF    Bacteria Urine None (A) NEG^Negative /HPF    Mucous Urine Present (A) NEG^Negative /LPF   ESR: Erythrocyte sedimentation rate   Result Value Ref Range    Sed Rate 7 0 - 30 mm/h       If you have any questions or concerns, please call the clinic at the number listed above.       Sincerely,        Mariela Dolan MD

## 2018-12-25 ASSESSMENT — ANXIETY QUESTIONNAIRES: GAD7 TOTAL SCORE: 0

## 2019-01-03 NOTE — PROGRESS NOTES
SUBJECTIVE:   Diana Ware is a 63 year old female who presents to clinic today for the following health issues:      Hypertension Follow-up      Outpatient blood pressures are not being checked.    Low Salt Diet: no added salt      Amount of exercise or physical activity: 2-3 days/week for an average of 15-30 minutes    Problems taking medications regularly: No    Medication side effects: none    Diet: regular (no restrictions)    We started lisinopril 5 mg daily for her which she is tolerating well.       Restless leg syndrome  She has been taking Mirapex for 30 years for RLS symptoms since she was a teen. Her sister Soniya has had similar severe symptoms, Mirapex worked well, but isn't as working as it has in the past. Her sister is seeing a specialist in the [facility] that Diana can't get in to see now and is on a waiting list. She would like to consider going off of Mirapex for a drug holiday and try something like Gabapentin or opioids recommended by another neurologist, Dr Diaz, a specialist in RLS.         Problem list and histories reviewed & adjusted, as indicated.  Additional history: as documented    Patient Active Problem List   Diagnosis     Advanced care planning/counseling discussion     Osteoporosis     Dysplastic Pap smear     Hyperlipidemia with target LDL less than 130     Migraine     Arthritis of knee, left     Restless leg syndrome     Fracture of foot bone without toes, closed     Injury of right lateral plantar nerve     Plantar fasciitis     Corneal erosion     Past Surgical History:   Procedure Laterality Date     APPENDECTOMY  1967     ARTHROSCOPY KNEE  2000    Left knee; medial meniscal tear     ARTHROSCOPY KNEE  2010    RT     COLONOSCOPY  2004    benign polyp, repeat 2014     COLONOSCOPY N/A 10/17/2014    Procedure: COLONOSCOPY;  Surgeon: Renzo Kearney MD;  Location: [facility]     FRACTURE TX, WRIST RT/LT Left 01/2017    plate and screws placed     LAPAROSCOPY DIAGNOSTIC (GENERAL)  1992     adhesions     ORTHOPEDIC SURGERY  2017    ORIF of left distal radius using DVR Biomet Plate     SALPINGECTOMY  1967    left salpingectomy, left oophorectomy; Teratoma     STRESS TEST  2010    Chest pain; negative, in [place]     STRESS THALLIUM TEST  2003    Chest pain; negative     SURGICAL PATHOLOGY EXAM  05/23/2013    [facility] Biopsy, Galileo Franz Mayo for 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, no dysplasia noted on [facility] biopsy       Social History     Tobacco Use     Smoking status: Never Smoker     Smokeless tobacco: Never Used   Substance Use Topics     Alcohol use: Yes     Comment: Socially     Family History   Problem Relation Age of Onset     Heart Disease Mother      Heart Disease Father      Cerebrovascular Disease Father      Heart Disease Maternal Grandmother      Diabetes Paternal Grandmother      Breast Cancer Maternal Aunt          Current Outpatient Medications   Medication Sig Dispense Refill     alendronate (FOSAMAX) 70 MG tablet TAKE 1 TABLET BY MOUTH ONCE WEEKLY ON AN EMPTY STOMACH 12 tablet 2     Calcium Carbonate-Vit D-Min (CALCIUM 1200 PO)        eflornithine HCl (VANIQA) 13.9 % CREA APPLY THIN LAYER TO AFFECTED AREA 2 TIMES DAILY 45 g 3     IBUPROFEN PO Take 400 mg by mouth every 6 hours as needed for moderate pain       lisinopril (PRINIVIL/ZESTRIL) 10 MG tablet Take 1 tablet (10 mg) by mouth daily 90 tablet 1     Multiple Vitamins-Minerals (WOMENS MULTIVITAMIN PLUS PO) Take by mouth daily       pramipexole (MIRAPEX) 0.5 MG tablet TAKE 1 OR 2 TABLETS BY MOUTH 2-3 HOURS BEFORE BEDTIME 90 tablet 2     rosuvastatin (CRESTOR) 5 MG tablet TAKE 1 TABLET BY MOUTH EVERY DAY 30 tablet 0     UNKNOWN TO PATIENT Topical ointment prescribed by Dr Townsend       Allergies   Allergen Reactions     Simvastatin Other (See Comments)     Myalgias and elevated CPK     BP Readings from Last 3 Encounters:   01/07/19 138/90   12/24/18 (!) 142/92   08/29/18 128/60    Wt Readings from Last 3 Encounters:   01/07/19 80.7 kg  (178 lb)   12/24/18 80.7 kg (178 lb)   08/29/18 74.8 kg (165 lb)                    Reviewed and updated as needed this visit by clinical staff       Reviewed and updated as needed this visit by Provider         ROS:  Constitutional, HEENT, cardiovascular, pulmonary, gi and gu systems are negative, except as otherwise noted.    OBJECTIVE:                                                    /90   Pulse 75   Resp 19   Wt 80.7 kg (178 lb)   SpO2 98%   BMI 28.73 kg/m     Body mass index is 28.73 kg/m .  GENERAL APPEARANCE: healthy, alert and no distress  RESP: lungs clear to auscultation - no rales, rhonchi or wheezes  CV: regular rates and rhythm, normal S1 S2, no S3 or S4 and no murmur, click or rub  NEURO: Normal strength and tone, mentation intact and speech normal  PSYCH: mentation appears normal and affect normal/bright         ASSESSMENT/PLAN:                                                    1. Benign essential hypertension  Improved, however not well controlled, will increase to 10 mg and recheck in 2 1/2 months after her surgery, left knee total arthroplasty.   - lisinopril (PRINIVIL/ZESTRIL) 10 MG tablet; Take 1 tablet (10 mg) by mouth daily  Dispense: 90 tablet; Refill: 1    2. Pre-op evaluation  Cleared for surgery for her left total knee arthroplasty    3. Restless leg syndrome  As above, will consider weaning off of Mirapex and trying gabapentin at her next visit          Mariela Dolan MD  [facility] [place]

## 2019-01-07 ENCOUNTER — TELEPHONE (OUTPATIENT)
Dept: FAMILY MEDICINE | Facility: OTHER | Age: 64
End: 2019-01-07

## 2019-01-07 ENCOUNTER — OFFICE VISIT (OUTPATIENT)
Dept: FAMILY MEDICINE | Facility: OTHER | Age: 64
End: 2019-01-07
Attending: FAMILY MEDICINE
Payer: COMMERCIAL

## 2019-01-07 VITALS
HEART RATE: 75 BPM | DIASTOLIC BLOOD PRESSURE: 90 MMHG | SYSTOLIC BLOOD PRESSURE: 128 MMHG | OXYGEN SATURATION: 98 % | BODY MASS INDEX: 28.73 KG/M2 | WEIGHT: 178 LBS | RESPIRATION RATE: 19 BRPM

## 2019-01-07 DIAGNOSIS — Z01.818 PRE-OP EVALUATION: ICD-10-CM

## 2019-01-07 DIAGNOSIS — G25.81 RESTLESS LEG SYNDROME: ICD-10-CM

## 2019-01-07 DIAGNOSIS — I10 BENIGN ESSENTIAL HYPERTENSION: Primary | ICD-10-CM

## 2019-01-07 PROCEDURE — 99213 OFFICE O/P EST LOW 20 MIN: CPT | Performed by: FAMILY MEDICINE

## 2019-01-07 PROCEDURE — G0463 HOSPITAL OUTPT CLINIC VISIT: HCPCS

## 2019-01-07 RX ORDER — LISINOPRIL 10 MG/1
10 TABLET ORAL DAILY
Qty: 90 TABLET | Refills: 1 | Status: SHIPPED | OUTPATIENT
Start: 2019-01-07 | End: 2019-04-17

## 2019-01-07 ASSESSMENT — PAIN SCALES - GENERAL: PAINLEVEL: NO PAIN (0)

## 2019-01-07 ASSESSMENT — ANXIETY QUESTIONNAIRES
2. NOT BEING ABLE TO STOP OR CONTROL WORRYING: NOT AT ALL
5. BEING SO RESTLESS THAT IT IS HARD TO SIT STILL: NOT AT ALL
3. WORRYING TOO MUCH ABOUT DIFFERENT THINGS: NOT AT ALL
4. TROUBLE RELAXING: NOT AT ALL
6. BECOMING EASILY ANNOYED OR IRRITABLE: NOT AT ALL
GAD7 TOTAL SCORE: 0
IF YOU CHECKED OFF ANY PROBLEMS ON THIS QUESTIONNAIRE, HOW DIFFICULT HAVE THESE PROBLEMS MADE IT FOR YOU TO DO YOUR WORK, TAKE CARE OF THINGS AT HOME, OR GET ALONG WITH OTHER PEOPLE: NOT DIFFICULT AT ALL
1. FEELING NERVOUS, ANXIOUS, OR ON EDGE: NOT AT ALL
7. FEELING AFRAID AS IF SOMETHING AWFUL MIGHT HAPPEN: NOT AT ALL

## 2019-01-07 ASSESSMENT — PATIENT HEALTH QUESTIONNAIRE - PHQ9: SUM OF ALL RESPONSES TO PHQ QUESTIONS 1-9: 4

## 2019-01-07 NOTE — TELEPHONE ENCOUNTER
Faxed H&P, EKG, and lab report to [facility]'s [facility] [phone]

## 2019-01-07 NOTE — Clinical Note
Terry, please send this note along with preop from 12.14.18, EKG and labs for surgery at [facility]

## 2019-01-07 NOTE — NURSING NOTE
"Chief Complaint   Patient presents with     Hypertension       Initial /90   Pulse 75   Resp 19   Wt 80.7 kg (178 lb)   SpO2 98%   BMI 28.73 kg/m   Estimated body mass index is 28.73 kg/m  as calculated from the following:    Height as of 9/7/17: 1.676 m (5' 6\").    Weight as of this encounter: 80.7 kg (178 lb).  Medication Reconciliation: complete    Moon Jalloh LPN    "

## 2019-01-09 ASSESSMENT — ANXIETY QUESTIONNAIRES: GAD7 TOTAL SCORE: 0

## 2019-01-18 DIAGNOSIS — E78.5 HYPERLIPIDEMIA LDL GOAL <130: ICD-10-CM

## 2019-01-18 RX ORDER — ROSUVASTATIN CALCIUM 5 MG/1
TABLET, COATED ORAL
Qty: 30 TABLET | Refills: 5 | Status: SHIPPED | OUTPATIENT
Start: 2019-01-18 | End: 2019-08-08

## 2019-01-18 NOTE — TELEPHONE ENCOUNTER
rosuvastatin (CRESTOR) 5 MG tablet  Last Written Prescription Date:  12/19/18  Last Fill Quantity: 30,   # refills: 0  Last Office Visit: 1/7/19  Future Office visit:    Next 5 appointments (look out 90 days)    Mar 12, 2019 10:00 AM CDT  (Arrive by 9:45 AM)  Office Visit with Mariela Dolan MD  [facility] [address]  [place] [address]  [phone]

## 2019-01-23 ENCOUNTER — TRANSFERRED RECORDS (OUTPATIENT)
Dept: HEALTH INFORMATION MANAGEMENT | Facility: CLINIC | Age: 64
End: 2019-01-23

## 2019-02-07 NOTE — PROGRESS NOTES
SUBJECTIVE:   Diana Ware is a 63 year old female who presents to clinic today for the following health issues:        Hospital Follow-up Visit:    Hospital/Nursing Home/IP Rehab Facility: [facility]  Date of Admission: 1/23/19  Date of Discharge: 1/26/19; sent to [facility] for rehab; now doing outpatient twice per week  Reason(s) for Admission: left total knee replacement             Problems taking medications regularly:  None       Medication changes since discharge: None       Problems adhering to non-medication therapy:  None    Summary of hospitalization:  See outside records, reviewed and scanned  Diagnostic Tests/Treatments reviewed.  Follow up needed: none  HGB was 12.  Removed linn Friday with Dr. Edwards.  5 days of constipation initially.   Off narcotics over a week now.  Getting around with walker just fine.  No complaints.  No issues with pain.    Other Healthcare Providers Involved in Patient s Care:         None  Update since discharge: improved.     Post Discharge Medication Reconciliation: discharge medications reconciled, continue medications without change.  Plan of care communicated with patient     Coding guidelines for this visit:  Type of Medical   Decision Making Face-to-Face Visit       within 7 Days of discharge Face-to-Face Visit        within 14 days of discharge   Moderate Complexity 39536 [id number]   High Complexity 74362 [id number]                Problem list and histories reviewed & adjusted, as indicated.  Additional history: as documented      Current Outpatient Medications   Medication     alendronate (FOSAMAX) 70 MG tablet     Calcium Carbonate-Vit D-Min (CALCIUM 1200 PO)     IBUPROFEN PO     lisinopril (PRINIVIL/ZESTRIL) 10 MG tablet     Multiple Vitamins-Minerals (WOMENS MULTIVITAMIN PLUS PO)     pramipexole (MIRAPEX) 0.5 MG tablet     rosuvastatin (CRESTOR) 5 MG tablet     UNKNOWN TO PATIENT     No current facility-administered medications for this visit.         Patient Active Problem List   Diagnosis     Advanced care planning/counseling discussion     Osteoporosis     Dysplastic Pap smear     Hyperlipidemia with target LDL less than 130     Migraine     Arthritis of knee, left     Restless leg syndrome     Fracture of foot bone without toes, closed     Injury of right lateral plantar nerve     Plantar fasciitis     Corneal erosion     Past Surgical History:   Procedure Laterality Date     APPENDECTOMY  1967     ARTHROSCOPY KNEE  2000    Left knee; medial meniscal tear     ARTHROSCOPY KNEE  2010    RT     COLONOSCOPY  2004    benign polyp, repeat 2014     COLONOSCOPY N/A 10/17/2014    Procedure: COLONOSCOPY;  Surgeon: Renzo Kearney MD;  Location: [facility]     FRACTURE TX, WRIST RT/LT Left 01/2017    plate and screws placed     LAPAROSCOPY DIAGNOSTIC (GENERAL)  1992    adhesions     ORTHOPEDIC SURGERY  2017    ORIF of left distal radius using DVR Biomet Plate     SALPINGECTOMY  1967    left salpingectomy, left oophorectomy; Teratoma     STRESS TEST  2010    Chest pain; negative, in [place]     STRESS THALLIUM TEST  2003    Chest pain; negative     SURGICAL PATHOLOGY EXAM  05/23/2013    [facility] Biopsy, Galileo Franz Mayo for 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, no dysplasia noted on [facility] biopsy       Social History     Tobacco Use     Smoking status: Never Smoker     Smokeless tobacco: Never Used   Substance Use Topics     Alcohol use: Yes     Comment: Socially     Family History   Problem Relation Age of Onset     Heart Disease Mother      Heart Disease Father      Cerebrovascular Disease Father      Heart Disease Maternal Grandmother      Diabetes Paternal Grandmother      Breast Cancer Maternal Aunt            Reviewed and updated as needed this visit by clinical staff  Tobacco  Allergies  Meds       Reviewed and updated as needed this visit by Provider         ROS:  Constitutional, HEENT, cardiovascular, pulmonary, gi and gu systems are negative, except as otherwise  noted.    OBJECTIVE:     /88   Pulse 77   Temp 99.9  F (37.7  C) (Tympanic)   SpO2 98%   There is no height or weight on file to calculate BMI.  GENERAL: healthy, alert and no distress  RESP: lungs clear to auscultation - no rales, rhonchi or wheezes  CV: regular rate and rhythm, normal S1 S2, no S3 or S4, no murmur, click or rub, no peripheral edema and peripheral pulses strong  MS: left leg with incision healing well; no drainage; no significant erythema; moderate swelling; using walker  SKIN: no suspicious lesions or rashes  PSYCH: mentation appears normal, affect normal/bright    Diagnostic Test Results:  none     ASSESSMENT/PLAN:     (Z96.652) S/P total knee arthroplasty, left  (primary encounter diagnosis)  Comment: doing well; has appropriate follow up; no new issues  Plan: continue current cares        Savanah Paredes MD  [facility] [place]

## 2019-02-08 ENCOUNTER — TRANSFERRED RECORDS (OUTPATIENT)
Dept: HEALTH INFORMATION MANAGEMENT | Facility: CLINIC | Age: 64
End: 2019-02-08

## 2019-02-11 ENCOUNTER — OFFICE VISIT (OUTPATIENT)
Dept: FAMILY MEDICINE | Facility: OTHER | Age: 64
End: 2019-02-11
Attending: FAMILY MEDICINE
Payer: COMMERCIAL

## 2019-02-11 VITALS
SYSTOLIC BLOOD PRESSURE: 142 MMHG | HEART RATE: 77 BPM | OXYGEN SATURATION: 98 % | DIASTOLIC BLOOD PRESSURE: 88 MMHG | TEMPERATURE: 99.9 F

## 2019-02-11 DIAGNOSIS — Z96.652 S/P TOTAL KNEE ARTHROPLASTY, LEFT: Primary | ICD-10-CM

## 2019-02-11 PROCEDURE — 99495 TRANSJ CARE MGMT MOD F2F 14D: CPT | Performed by: FAMILY MEDICINE

## 2019-02-11 PROCEDURE — G0463 HOSPITAL OUTPT CLINIC VISIT: HCPCS

## 2019-02-11 PROCEDURE — 99214 OFFICE O/P EST MOD 30 MIN: CPT | Performed by: FAMILY MEDICINE

## 2019-02-11 ASSESSMENT — PAIN SCALES - GENERAL: PAINLEVEL: NO PAIN (0)

## 2019-02-11 NOTE — NURSING NOTE
"Chief Complaint   Patient presents with     Hospital F/U       Initial /88   Pulse 77   Temp 99.9  F (37.7  C) (Tympanic)   SpO2 98%  Estimated body mass index is 28.73 kg/m  as calculated from the following:    Height as of 9/7/17: 1.676 m (5' 6\").    Weight as of 1/7/19: 80.7 kg (178 lb).  Medication Reconciliation: complete    Francia Lam LPN  "

## 2019-02-13 DIAGNOSIS — R52 PAIN: ICD-10-CM

## 2019-02-13 DIAGNOSIS — M81.0 OSTEOPOROSIS: ICD-10-CM

## 2019-02-13 DIAGNOSIS — Z00.00 HEALTHCARE MAINTENANCE: Primary | ICD-10-CM

## 2019-02-13 DIAGNOSIS — I10 BENIGN ESSENTIAL HYPERTENSION: ICD-10-CM

## 2019-02-13 DIAGNOSIS — E78.5 HYPERLIPIDEMIA LDL GOAL <130: ICD-10-CM

## 2019-02-13 DIAGNOSIS — M81.0 AGE-RELATED OSTEOPOROSIS WITHOUT CURRENT PATHOLOGICAL FRACTURE: ICD-10-CM

## 2019-02-13 DIAGNOSIS — G25.81 RESTLESS LEGS SYNDROME (RLS): ICD-10-CM

## 2019-02-13 NOTE — TELEPHONE ENCOUNTER
crestor       Last Written Prescription Date:  1/18/19  Last Fill Quantity: 30,   # refills: 5  Last Office Visit: 2/11/19  Future Office visit:    Next 5 appointments (look out 90 days)    Mar 12, 2019 10:00 AM CDT  (Arrive by 9:45 AM)  [facility] [address]  [place] [address]  [phone]         mirapex      Last Written Prescription Date:  11/6/18  Last Fill Quantity: 90,   # refills: 2  Last Office Visit: 2/11/19  Future Office visit:    Next 5 appointments (look out 90 days)    Mar 12, 2019 10:00 AM CDT  (Arrive by 9:45 AM)  [facility] Visit with [facility]  [facility] [address]  [place] [address]  [phone]         fosamax      Last Written Prescription Date:  11/27/18  Last Fill Quantity: 12,   # refills: 2  Last Office Visit: 2/11/19  Future Office visit:    Next 5 appointments (look out 90 days)    Mar 12, 2019 10:00 AM CDT  (Arrive by 9:45 AM)  Office Visit with [facility]a [facility]  [facility] [address]  [place] [address]  [phone]             multivitamin       clLast Written Prescription Date:  Historical medication   Last Fill Quantity: 0,   # refills: 0  Last Office Visit: 2/11/19  Future Office visit:    Next 5 appointments (look out 90 days)    Mar 12, 2019 10:00 AM CDT  (Arrive by 9:45 AM)  [facility] Visit [facility]  [facility] [address]  [facility] [address]  [phone]         Ibuprofen    Last Written Prescription Date:  Historical medication   Last Fill Quantity: 0,   # refills: 0  Last Office Visit: 2/11/19  Future Office visit:    Next 5 appointments (look out 90 days)    Mar 12, 2019 10:00 AM CDT  (Arrive by 9:45 AM)  Office Visit with Mariela Dolan MD  [facility]  [place] [facility] [place] [facility] [address]  [place] [address]  [phone]           Calcium      Last Written Prescription Date:  historical medication   Last Fill Quantity:0,   # refills: 0  Last Office Visit: 2/11/19  Future Office visit:    Next 5 appointments (look out 90 days)    Mar 12, 2019 10:00 AM CDT  (Arrive by 9:45 AM)  Office Visit with Mariela Dolan MD  [facility] [place] ([facility] [address]  [place] [address]  [phone]           Lisinopril       Last Written Prescription Date:  Med not on list   Last Fill Quantity: 0,   # refills: 0  Last Office Visit: 2/11/19  Future Office visit:    Next 5 appointments (look out 90 days)    Mar 12, 2019 10:00 AM CDT  (Arrive by 9:45 AM)  Office Visit with [facility]  [facility] [address] [facility] [address]  [phone]           Routing refill request to provider for review/approval because:  Drug not active on patient's medication list

## 2019-02-14 RX ORDER — PRAMIPEXOLE DIHYDROCHLORIDE 0.5 MG/1
TABLET ORAL
Qty: 90 TABLET | Refills: 0 | Status: SHIPPED | OUTPATIENT
Start: 2019-02-14 | End: 2019-03-13

## 2019-02-14 RX ORDER — LISINOPRIL 10 MG/1
10 TABLET ORAL DAILY
Qty: 90 TABLET | Refills: 3 | OUTPATIENT
Start: 2019-02-14 | End: 2020-02-14

## 2019-02-14 RX ORDER — ALENDRONATE SODIUM 70 MG/1
TABLET ORAL
Qty: 12 TABLET | Refills: 0 | OUTPATIENT
Start: 2019-02-14

## 2019-02-14 RX ORDER — ROSUVASTATIN CALCIUM 5 MG/1
5 TABLET, COATED ORAL DAILY
Qty: 30 TABLET | Refills: 5 | OUTPATIENT
Start: 2019-02-14

## 2019-02-14 RX ORDER — LISINOPRIL 10 MG/1
10 TABLET ORAL DAILY
Qty: 90 TABLET | Refills: 1 | OUTPATIENT
Start: 2019-02-14

## 2019-02-19 ENCOUNTER — TELEPHONE (OUTPATIENT)
Dept: FAMILY MEDICINE | Facility: OTHER | Age: 64
End: 2019-02-19

## 2019-02-19 RX ORDER — IBUPROFEN 400 MG/1
400 TABLET, FILM COATED ORAL EVERY 6 HOURS PRN
Qty: 60 TABLET | Refills: 0 | Status: CANCELLED | OUTPATIENT
Start: 2019-02-19 | End: 2020-02-19

## 2019-02-19 RX ORDER — ACETAMINOPHEN 500 MG
1 TABLET ORAL DAILY
Qty: 100 TABLET | Refills: 3 | Status: CANCELLED | OUTPATIENT
Start: 2019-02-19

## 2019-02-19 NOTE — TELEPHONE ENCOUNTER
For some reason I am not able to sign her refills. I sign, it appears to be ok but doesn't finish. This may have to be resent

## 2019-02-20 NOTE — TELEPHONE ENCOUNTER
Other meds requested by pharmacy already had refills. Sometimes pharmacy will request refills when they already have refills. So we notify the pharmacy of this. Pt has refills.

## 2019-03-05 NOTE — PROGRESS NOTES
SUBJECTIVE:   Diana Ware is a 63 year old female who presents to clinic today for the following health issues:      Hypertension Follow-up      Outpatient blood pressures are not being checked.    Low Salt Diet: no added salt      Amount of exercise or physical activity: 4-5 days/week for an average of 30-45 minutes    Problems taking medications regularly: No    Medication side effects: none    Diet: regular (no restrictions)    She isn't having issues with her lisinopril started just prior to her surgery    Restless Leg Syndrome      Duration: ongoing for months    Description (location/character/radiation): follow up on legs, takes Mirapex    Intensity:  0/10    Accompanying signs and symptoms: leg pains at night    History (similar episodes/previous evaluation): None    Precipitating or alleviating factors: taking Mirapex     Therapies tried and outcome: None     She has an appointment at [facility] to be evaluated by DR Alexandra Polo on April 4 with a sleep study. She notes that her left leg has been worse since she had her left knee arthroplasty, waking her up at night with symptoms during the day as well. The right leg isn't bothersome. She is needing to take her mirapex three times a day for symptoms. Her sister Soniya was seen by this provider with good success.   Problem list and histories reviewed & adjusted, as indicated.  Additional history: as documented    Patient Active Problem List   Diagnosis     Advanced care planning/counseling discussion     Osteoporosis     Dysplastic Pap smear     Hyperlipidemia with target LDL less than 130     Migraine     Arthritis of knee, left     Restless leg syndrome     Fracture of foot bone without toes, closed     Injury of right lateral plantar nerve     Plantar fasciitis     Corneal erosion     Decreased mobility     Left leg weakness     Benign essential hypertension     History of total left knee replacement     Past Surgical History:   Procedure Laterality Date      APPENDECTOMY  1967     ARTHROSCOPY KNEE  2000    Left knee; medial meniscal tear     ARTHROSCOPY KNEE  2010    RT     COLONOSCOPY  2004    benign polyp, repeat 2014     COLONOSCOPY N/A 10/17/2014    Procedure: COLONOSCOPY;  Surgeon: Renzo Kearney MD;  Location: [facility]     FRACTURE TX, WRIST RT/LT Left 01/2017    plate and screws placed     LAPAROSCOPY DIAGNOSTIC (GENERAL)  1992    adhesions     ORTHOPEDIC SURGERY  2017    ORIF of left distal radius using DVR Biomet Plate     SALPINGECTOMY  1967    left salpingectomy, left oophorectomy; Teratoma     STRESS TEST  2010    Chest pain; negative, in [place]     STRESS THALLIUM TEST  2003    Chest pain; negative     SURGICAL PATHOLOGY EXAM  05/23/2013    [facility] Biopsy, Galileo Franz Mayo for 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, no dysplasia noted on [facility] biopsy       Social History     Tobacco Use     Smoking status: Never Smoker     Smokeless tobacco: Never Used   Substance Use Topics     Alcohol use: Yes     Comment: Socially     Family History   Problem Relation Age of Onset     Heart Disease Mother      Heart Disease Father      Cerebrovascular Disease Father      Heart Disease Maternal Grandmother      Diabetes Paternal Grandmother      Breast Cancer Maternal Aunt          Current Outpatient Medications   Medication Sig Dispense Refill     alendronate (FOSAMAX) 70 MG tablet TAKE 1 TABLET BY MOUTH ONCE WEEKLY ON AN EMPTY STOMACH 12 tablet 2     Calcium Carbonate-Vit D-Min (CALCIUM 1200 PO)        IBUPROFEN PO Take 400 mg by mouth every 6 hours as needed for moderate pain       lisinopril (PRINIVIL/ZESTRIL) 10 MG tablet Take 1 tablet (10 mg) by mouth daily 90 tablet 1     Multiple Vitamins-Minerals (WOMENS MULTIVITAMIN PLUS PO) Take by mouth daily       pramipexole (MIRAPEX) 0.5 MG tablet TAKE 1 OR 2 TABLETS BY MOUTH 2-3 HOURS BEFORE BEDTIME 90 tablet 0     rosuvastatin (CRESTOR) 5 MG tablet TAKE 1 TABLET BY MOUTH DAILY 30 tablet 5     Allergies   Allergen Reactions      Simvastatin Other (See Comments)     Myalgias and elevated CPK     BP Readings from Last 3 Encounters:   03/12/19 132/74   02/11/19 142/88   01/07/19 128/90    Wt Readings from Last 3 Encounters:   03/12/19 80.7 kg (178 lb)   01/07/19 80.7 kg (178 lb)   12/24/18 80.7 kg (178 lb)                    Reviewed and updated as needed this visit by clinical staff       Reviewed and updated as needed this visit by Provider         ROS:  Constitutional, HEENT, cardiovascular, pulmonary, gi and gu systems are negative, except as otherwise noted.    OBJECTIVE:                                                    /74   Pulse 84   Resp 20   Wt 80.7 kg (178 lb)   SpO2 98%   BMI 28.73 kg/m     Body mass index is 28.73 kg/m .  GENERAL APPEARANCE: healthy, alert and no distress  RESP: lungs clear to auscultation - no rales, rhonchi or wheezes  CV: regular rates and rhythm, normal S1 S2, no S3 or S4 and no murmur, click or rub  NEURO: Normal strength and tone, mentation intact and speech normal  PSYCH: mentation appears normal and affect normal/bright         ASSESSMENT/PLAN:                                                    1. Benign essential hypertension  Continue lisinopril, check labs today  - Basic metabolic panel    2. Restless leg syndrome  We won't change medications or check labs today as this will be done at [facility]    3. History of total left knee replacement  Doing well      Follow up with Provider - 5 months for PE     Mariela Dolan MD  [facility] [place]

## 2019-03-08 ENCOUNTER — TRANSFERRED RECORDS (OUTPATIENT)
Dept: HEALTH INFORMATION MANAGEMENT | Facility: CLINIC | Age: 64
End: 2019-03-08

## 2019-03-08 ENCOUNTER — ANCILLARY PROCEDURE (OUTPATIENT)
Dept: GENERAL RADIOLOGY | Facility: OTHER | Age: 64
End: 2019-03-08
Attending: ORTHOPAEDIC SURGERY
Payer: MEDICARE

## 2019-03-08 DIAGNOSIS — Z47.1 AFTERCARE FOLLOWING JOINT REPLACEMENT: ICD-10-CM

## 2019-03-08 PROCEDURE — 73560 X-RAY EXAM OF KNEE 1 OR 2: CPT | Mod: TC,LT,FY

## 2019-03-11 PROBLEM — R26.89 DECREASED MOBILITY: Status: ACTIVE | Noted: 2019-02-06

## 2019-03-11 PROBLEM — R29.898 LEFT LEG WEAKNESS: Status: ACTIVE | Noted: 2019-02-06

## 2019-03-12 ENCOUNTER — OFFICE VISIT (OUTPATIENT)
Dept: FAMILY MEDICINE | Facility: OTHER | Age: 64
End: 2019-03-12
Attending: FAMILY MEDICINE
Payer: COMMERCIAL

## 2019-03-12 VITALS
HEART RATE: 84 BPM | BODY MASS INDEX: 28.73 KG/M2 | DIASTOLIC BLOOD PRESSURE: 74 MMHG | WEIGHT: 178 LBS | OXYGEN SATURATION: 98 % | RESPIRATION RATE: 20 BRPM | SYSTOLIC BLOOD PRESSURE: 132 MMHG

## 2019-03-12 DIAGNOSIS — G25.81 RESTLESS LEG SYNDROME: ICD-10-CM

## 2019-03-12 DIAGNOSIS — I10 BENIGN ESSENTIAL HYPERTENSION: Primary | ICD-10-CM

## 2019-03-12 DIAGNOSIS — Z96.652 HISTORY OF TOTAL LEFT KNEE REPLACEMENT: ICD-10-CM

## 2019-03-12 PROCEDURE — 99213 OFFICE O/P EST LOW 20 MIN: CPT | Performed by: FAMILY MEDICINE

## 2019-03-12 PROCEDURE — G0463 HOSPITAL OUTPT CLINIC VISIT: HCPCS

## 2019-03-12 ASSESSMENT — PAIN SCALES - GENERAL: PAINLEVEL: NO PAIN (0)

## 2019-03-12 NOTE — NURSING NOTE
"Chief Complaint   Patient presents with     Hypertension     Musculoskeletal Problem       Initial /74   Pulse 84   Resp 20   Wt 80.7 kg (178 lb)   SpO2 98%   BMI 28.73 kg/m   Estimated body mass index is 28.73 kg/m  as calculated from the following:    Height as of 9/7/17: 1.676 m (5' 6\").    Weight as of this encounter: 80.7 kg (178 lb).  Medication Reconciliation: complete    Moon Jalloh LPN    "

## 2019-03-13 DIAGNOSIS — G25.81 RESTLESS LEGS SYNDROME (RLS): ICD-10-CM

## 2019-03-13 NOTE — TELEPHONE ENCOUNTER
Mirapex       Last Written Prescription Date:  2/14/2019  Last Fill Quantity: 90,   # refills: 0  Last Office Visit: 3/12/2019  Future Office visit:

## 2019-03-14 RX ORDER — PRAMIPEXOLE DIHYDROCHLORIDE 0.5 MG/1
TABLET ORAL
Qty: 90 TABLET | Refills: 0 | Status: SHIPPED | OUTPATIENT
Start: 2019-03-14 | End: 2019-04-24

## 2019-04-04 ENCOUNTER — TRANSFERRED RECORDS (OUTPATIENT)
Dept: HEALTH INFORMATION MANAGEMENT | Facility: CLINIC | Age: 64
End: 2019-04-04

## 2019-04-05 ENCOUNTER — TRANSFERRED RECORDS (OUTPATIENT)
Dept: HEALTH INFORMATION MANAGEMENT | Facility: CLINIC | Age: 64
End: 2019-04-05

## 2019-04-15 DIAGNOSIS — M81.0 OSTEOPOROSIS: ICD-10-CM

## 2019-04-15 DIAGNOSIS — I10 BENIGN ESSENTIAL HYPERTENSION: ICD-10-CM

## 2019-04-15 NOTE — TELEPHONE ENCOUNTER
Lisinopril       Last Written Prescription Date:  1/7/19  Last Fill Quantity: 90,   # refills: 1  Last Office Visit: 3/12/19  Future Office visit:       alendronate       Last Written Prescription Date:  11/27/18  Last Fill Quantity: 12,   # refills: 2  Last Office Visit: 3/12/19  Future Office visit:

## 2019-04-17 RX ORDER — LISINOPRIL 10 MG/1
10 TABLET ORAL DAILY
Qty: 90 TABLET | Refills: 0 | Status: SHIPPED | OUTPATIENT
Start: 2019-04-17 | End: 2019-07-29

## 2019-04-17 RX ORDER — ALENDRONATE SODIUM 70 MG/1
TABLET ORAL
Qty: 12 TABLET | Refills: 0 | Status: SHIPPED | OUTPATIENT
Start: 2019-04-17 | End: 2019-07-29

## 2019-04-24 ENCOUNTER — TRANSFERRED RECORDS (OUTPATIENT)
Dept: HEALTH INFORMATION MANAGEMENT | Facility: CLINIC | Age: 64
End: 2019-04-24

## 2019-05-06 ENCOUNTER — TRANSFERRED RECORDS (OUTPATIENT)
Dept: HEALTH INFORMATION MANAGEMENT | Facility: CLINIC | Age: 64
End: 2019-05-06

## 2019-06-03 ENCOUNTER — TRANSFERRED RECORDS (OUTPATIENT)
Dept: HEALTH INFORMATION MANAGEMENT | Facility: CLINIC | Age: 64
End: 2019-06-03

## 2019-06-14 ENCOUNTER — TRANSFERRED RECORDS (OUTPATIENT)
Dept: HEALTH INFORMATION MANAGEMENT | Facility: CLINIC | Age: 64
End: 2019-06-14

## 2019-07-01 ENCOUNTER — TRANSFERRED RECORDS (OUTPATIENT)
Dept: HEALTH INFORMATION MANAGEMENT | Facility: CLINIC | Age: 64
End: 2019-07-01

## 2019-07-10 DIAGNOSIS — G25.81 RESTLESS LEGS SYNDROME (RLS): ICD-10-CM

## 2019-07-10 RX ORDER — PRAMIPEXOLE DIHYDROCHLORIDE 0.5 MG/1
TABLET ORAL
Qty: 90 TABLET | Refills: 1 | Status: SHIPPED | OUTPATIENT
Start: 2019-07-10 | End: 2019-09-13

## 2019-07-29 DIAGNOSIS — M81.0 OSTEOPOROSIS: ICD-10-CM

## 2019-07-29 DIAGNOSIS — I10 BENIGN ESSENTIAL HYPERTENSION: ICD-10-CM

## 2019-07-30 RX ORDER — LISINOPRIL 10 MG/1
10 TABLET ORAL DAILY
Qty: 90 TABLET | Refills: 1 | Status: SHIPPED | OUTPATIENT
Start: 2019-07-30 | End: 2020-02-18

## 2019-07-30 RX ORDER — ALENDRONATE SODIUM 70 MG/1
TABLET ORAL
Qty: 12 TABLET | Refills: 0 | Status: SHIPPED | OUTPATIENT
Start: 2019-07-30 | End: 2019-10-29

## 2019-08-08 DIAGNOSIS — E78.5 HYPERLIPIDEMIA LDL GOAL <130: ICD-10-CM

## 2019-08-08 RX ORDER — ROSUVASTATIN CALCIUM 5 MG/1
5 TABLET, COATED ORAL DAILY
Qty: 30 TABLET | Refills: 0 | Status: SHIPPED | OUTPATIENT
Start: 2019-08-08 | End: 2019-09-15

## 2019-08-28 NOTE — PROGRESS NOTES
"SUBJECTIVE:   Diana Ware is a 64 year old female who presents for Preventive Visit.    Are you in the first 12 months of your Medicare Part B coverage?  No    Physical Health:    In general, how would you rate your overall physical health? excellent    Outside of work, how many days during the week do you exercise? 2-3 days/week    Outside of work, approximately how many minutes a day do you exercise?15-30 minutes    If you drink alcohol do you typically have >3 drinks per day or >7 drinks per week? No    Do you usually eat at least 4 servings of fruit and vegetables a day, include whole grains & fiber and avoid regularly eating high fat or \"junk\" foods? Yes    Do you have any problems taking medications regularly?  No    Do you have any side effects from medications? dry cough     Needs assistance for the following daily activities: no assistance needed    Which of the following safety concerns are present in your home?  none identified     Hearing impairment: No    In the past 6 months, have you been bothered by leaking of urine? no    Mental Health:    In general, how would you rate your overall mental or emotional health? excellent  PHQ-2 Score:      Do you feel safe in your environment? Yes    Do you have a Health Care Directive? No: Advance care planning was reviewed with patient; patient declined at this time.    Additional concerns to address?  YES    Fall risk:  Fallen 2 or more times in the past year?: No  Any fall with injury in the past year?: No    Cognitive Screenin) Repeat 3 items (Leader, Season, Table)    2) Clock draw: NORMAL  3) 3 item recall: Recalls 3 objects  Results: 3 items recalled: COGNITIVE IMPAIRMENT LESS LIKELY    Mini-CogTM Copyright KIMBERLY Christensen. Licensed by the author for use in [facility]; reprinted with permission (angus@.[facility]). All rights reserved.      Do you have sleep apnea, excessive snoring or daytime drowsiness?: yes        Sleep apnea      Duration: since " early spring    Description (location/character/radiation): was diagnosed through a sleep study at the [facility], unsure of CPAP supplies     Intensity:  mild    Accompanying signs and symptoms: none    History (similar episodes/previous evaluation): None    Precipitating or alleviating factors: None    Therapies tried and outcome: None     She has not tried the CPAP machine and wants to try a dental appliance first      Cough  She has developed a dry cough after starting lisinopril that occurs occasionally, sometimes awakening her from sleep. She doesn't feel this is bad enough to change her medication at this time  Reviewed and updated as needed this visit by clinical staff    Restless leg syndrome  She was evaluated at [facility] and found to be iron deficient and was started on 65 mg tid which she is tolerating. This has been helpful along with taking her Mirapex at 4 and 8 pm. Her symptoms had worsened after her knee surgery but now are much improved.          Reviewed and updated as needed this visit by Provider        Social History     Tobacco Use     Smoking status: Never Smoker     Smokeless tobacco: Never Used   Substance Use Topics     Alcohol use: Yes     Comment: Socially                           Current providers sharing in care for this patient include:   Patient Care Team:  Mariela Dolan MD as PCP - General  Mariela Dolan MD as Assigned PCP    The following health maintenance items are reviewed in Epic and correct as of today:  Health Maintenance   Topic Date Due     ZOSTER IMMUNIZATION (2 of 3) 03/01/2013     DEXA  07/14/2019     MEDICARE ANNUAL WELLNESS VISIT  08/29/2019     PNEUMOCOCCAL IMMUNIZATION 65+ LOW/MEDIUM RISK (2 of 2 - PPSV23) 08/29/2019     INFLUENZA VACCINE (1) 09/01/2019     FALL RISK ASSESSMENT  01/07/2020     PAP  08/08/2020     MAMMO SCREENING  08/29/2020     DTAP/TDAP/TD IMMUNIZATION (4 - Td) 01/23/2022     LIPID  08/29/2023     ADVANCE CARE PLANNING  03/12/2024     COLONOSCOPY   10/23/2024     PHQ-2  Completed     HEPATITIS C SCREENING  Addressed     IPV IMMUNIZATION  Aged Out     MENINGITIS IMMUNIZATION  Aged Out     BP Readings from Last 3 Encounters:   09/03/19 124/72   03/12/19 132/74   02/11/19 142/88    Wt Readings from Last 3 Encounters:   09/03/19 80.7 kg (178 lb)   03/12/19 80.7 kg (178 lb)   01/07/19 80.7 kg (178 lb)                  Patient Active Problem List   Diagnosis     Advanced care planning/counseling discussion     Osteoporosis     Dysplastic Pap smear     Hyperlipidemia with target LDL less than 130     Migraine     Arthritis of knee, left     Restless leg syndrome     Fracture of foot bone without toes, closed     Injury of right lateral plantar nerve     Plantar fasciitis     Corneal erosion     Decreased mobility     Left leg weakness     Benign essential hypertension     History of total left knee replacement     AK (actinic keratosis)     Past Surgical History:   Procedure Laterality Date     APPENDECTOMY  1967     ARTHROPLASTY KNEE Left 01/23/2019    Dr Edwards     ARTHROSCOPY KNEE  2000    Left knee; medial meniscal tear     ARTHROSCOPY KNEE  2010    RT     COLONOSCOPY  2004    benign polyp, repeat 2014     COLONOSCOPY N/A 10/17/2014    Procedure: COLONOSCOPY;  Surgeon: Renzo Kearney MD;  Location: [facility]     FRACTURE TX, WRIST RT/LT Left 01/2017    plate and screws placed     LAPAROSCOPY DIAGNOSTIC (GENERAL)  1992    adhesions     ORTHOPEDIC SURGERY  2017    ORIF of left distal radius using DVR Biomet Plate     SALPINGECTOMY  1967    left salpingectomy, left oophorectomy; Teratoma     STRESS TEST  2010    Chest pain; negative, in [place]     STRESS THALLIUM TEST  2003    Chest pain; negative     SURGICAL PATHOLOGY EXAM  05/23/2013    [facility]opsy, [facility] Mayo for 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, no dysplasia noted on [facility] biopsy       Social History     Tobacco Use     Smoking status: Never Smoker     Smokeless tobacco: Never Used   Substance Use Topics      Alcohol use: Yes     Comment: Socially     Family History   Problem Relation Age of Onset     Heart Disease Mother      Heart Disease Father      Cerebrovascular Disease Father      Heart Disease Maternal Grandmother      Diabetes Paternal Grandmother      Breast Cancer Maternal Aunt          Current Outpatient Medications   Medication Sig Dispense Refill     alendronate (FOSAMAX) 70 MG tablet TAKE 1 TABLET BY MOUTH ONCE WEEKLY ON AN EMPTY STOMACH 12 tablet 0     aspirin 81 MG EC tablet Take 81 mg by mouth daily       Calcium Carbonate-Vit D-Min (CALCIUM 1200 PO)        ferrous fumarate 65 mg, [place]. FE,-Vitamin C 125 mg (VITRON-C)  MG TABS tablet Take 1 tablet by mouth 3 times daily       IBUPROFEN PO Take 400 mg by mouth every 6 hours as needed for moderate pain       lisinopril (PRINIVIL/ZESTRIL) 10 MG tablet TAKE 1 TABLET (10 MG) BY MOUTH DAILY 90 tablet 1     Multiple Vitamins-Minerals (WOMENS MULTIVITAMIN PLUS PO) Take by mouth daily       pramipexole (MIRAPEX) 0.5 MG tablet TAKE 1 OR 2 TABLETS BY MOUTH 2-3 HOURS BEFORE BEDTIME 90 tablet 1     rosuvastatin (CRESTOR) 5 MG tablet Take 1 tablet (5 mg) by mouth daily **Please schedule office visit for future fills** 30 tablet 0     Allergies   Allergen Reactions     Simvastatin Other (See Comments)     Myalgias and elevated CPK     Mammogram Screening: Mammogram Screening: Patient over age 50, mutual decision to screen reflected in health maintenance.  Last 3 Pap and HPV Results:   PAP / HPV Latest Ref Rng & Units 8/8/2017 8/2/2016 8/17/2015   PAP - NIL NIL NIL   HPV 16 DNA NEG:Negative Negative - -   HPV 18 DNA NEG:Negative Negative - -   OTHER HR HPV NEG:Negative Negative - -       ROS:  CONSTITUTIONAL: NEGATIVE for fever, chills, change in weight  INTEGUMENTARY/SKIN: NEGATIVE for worrisome rashes, moles or lesions  EYES: NEGATIVE for vision changes or irritation  ENT/MOUTH: NEGATIVE for ear, mouth and throat problems  RESP: NEGATIVE for significant  "cough or SOB  BREAST: NEGATIVE for masses, tenderness or discharge  CV: NEGATIVE for chest pain, palpitations or peripheral edema  GI: NEGATIVE for nausea, abdominal pain, heartburn, or change in bowel habits  : NEGATIVE for frequency, dysuria, or hematuria  MUSCULOSKELETAL: NEGATIVE for significant arthralgias or myalgia  NEURO: NEGATIVE for weakness, dizziness or paresthesias  ENDOCRINE: NEGATIVE for temperature intolerance, skin/hair changes  HEME: NEGATIVE for bleeding problems  PSYCHIATRIC: NEGATIVE for changes in mood or affect    OBJECTIVE:   There were no vitals taken for this visit. Estimated body mass index is 28.73 kg/m  as calculated from the following:    Height as of 9/7/17: 1.676 m (5' 6\").    Weight as of 3/12/19: 80.7 kg (178 lb).  EXAM:   GENERAL APPEARANCE: healthy, alert and no distress  EYES: Eyes grossly normal to inspection, PERRL and conjunctivae and sclerae normal  HENT: ear canals and TM's normal, nose and mouth without ulcers or lesions, oropharynx clear and oral mucous membranes moist  NECK: no adenopathy, no asymmetry, masses, or scars and thyroid normal to palpation  RESP: lungs clear to auscultation - no rales, rhonchi or wheezes  BREAST: normal without masses, tenderness or nipple discharge and no palpable axillary masses or adenopathy  CV: regular rate and rhythm, normal S1 S2, no S3 or S4, no murmur, click or rub, no peripheral edema and peripheral pulses strong  ABDOMEN: soft, nontender, no hepatosplenomegaly, no masses and bowel sounds normal  MS: no musculoskeletal defects are noted and gait is age appropriate without ataxia  SKIN: no suspicious lesions or rashes  NEURO: Normal strength and tone, sensory exam grossly normal, mentation intact and speech normal  PSYCH: mentation appears normal and affect normal/bright        ASSESSMENT / PLAN:   1. Routine general medical examination at a health care facility  She will return for fasting labs  - Lipid Profile; Future  - Glucose; " "Future    2. Benign essential hypertension  Good control. She prefers not to change her medication at this time. Recheck one year  - Basic metabolic panel; Future    3. Restless leg syndrome  Improved with iron tid and mirapex 2 times daily. Recheck iron levels. She had been donating multiple times per year causing iron deficiency and is advised to limit her donating     4. Hyperlipidemia with target LDL less than 130  Due for labs    5. Age-related osteoporosis without current pathological fracture  Due for dexa scan, ordered    6. Estrogen deficiency    - DX Hip/Pelvis/Spine; Future    7. Iron deficiency  Check labs, due to donating blood frequently  - Iron and iron binding capacity; Future  - Ferritin; Future    8. ACE-inhibitor cough  Prefers to continue with lisinopril at this time    9. Varicella vaccine  She would like to proceed with Shingrix today; this is given      End of Life Planning:  Patient currently has an advanced directive: No.  I have verified the patient's ablity to prepare an advanced directive/make health care decisions.  Literature was provided to assist patient in preparing an advanced directive.    COUNSELING:  Reviewed preventive health counseling, as reflected in patient instructions       Regular exercise       Healthy diet/nutrition       Varicella vaccine    Estimated body mass index is 28.73 kg/m  as calculated from the following:    Height as of 9/7/17: 1.676 m (5' 6\").    Weight as of 3/12/19: 80.7 kg (178 lb).         reports that she has never smoked. She has never used smokeless tobacco.      Appropriate preventive services were discussed with this patient, including applicable screening as appropriate for cardiovascular disease, diabetes, osteopenia/osteoporosis, and glaucoma.  As appropriate for age/gender, discussed screening for colorectal cancer, prostate cancer, breast cancer, and cervical cancer. Checklist reviewing preventive services available has been given to the " patient.    Reviewed patients plan of care and provided an AVS. The Basic Care Plan (routine screening as documented in Health Maintenance) for Diana meets the Care Plan requirement. This Care Plan has been established and reviewed with the Patient.    Counseling Resources:  ATP IV Guidelines  Pooled Cohorts Equation Calculator  Breast Cancer Risk Calculator  FRAX Risk Assessment  ICSI Preventive Guidelines  Dietary Guidelines for Americans, 2010  USDA's MyPlate  ASA Prophylaxis  Lung CA Screening    Mariela Dolan MD  [facility]

## 2019-08-28 NOTE — PATIENT INSTRUCTIONS
Preventive Health Recommendations    See your health care provider every year to    Review health changes.     Discuss preventive care.      Review your medicines if your doctor has prescribed any.      You no longer need a yearly Pap test unless you've had an abnormal Pap test in the past 10 years. If you have vaginal symptoms, such as bleeding or discharge, be sure to talk with your provider about a Pap test.      Every 1 to 2 years, have a mammogram.  If you are over 69, talk with your health care provider about whether or not you want to continue having screening mammograms.      Every 10 years, have a colonoscopy. Or, have a yearly FIT test (stool test). These exams will check for colon cancer.       Have a cholesterol test every 5 years, or more often if your doctor advises it.       Have a diabetes test (fasting glucose) every three years. If you are at risk for diabetes, you should have this test more often.       At age 65, have a bone density scan (DEXA) to check for osteoporosis (brittle bone disease).    Shots:    Get a flu shot each year.    Get a tetanus shot every 10 years.    Talk to your doctor about your pneumonia vaccines. There are now two you should receive - Pneumovax (PPSV 23) and Prevnar (PCV 13).    Talk to your pharmacist about the shingles vaccine.    Talk to your doctor about the hepatitis B vaccine.    Nutrition:     Eat at least 5 servings of fruits and vegetables each day.      Eat whole-grain bread, whole-wheat pasta and brown rice instead of white grains and rice.      Get adequate about Calcium and Vitamin D.     Lifestyle    Exercise at least 150 minutes a week (30 minutes a day, 5 days a week). This will help you control your weight and prevent disease.      Limit alcohol to one drink per day.      No smoking.       Wear sunscreen to prevent skin cancer.       See your dentist twice a year for an exam and cleaning.      See your eye doctor every 1 to 2 years to screen for  conditions such as glaucoma, macular degeneration, cataracts, etc.    Personalized Prevention Plan  You are due for the preventive services outlined below.  Your care team is available to assist you in scheduling these services.  If you have already completed any of these items, please share that information with your care team to update in your medical record.    Health Maintenance Due   Topic Date Due     Zoster (Shingles) Vaccine (2 of 3) 03/01/2013     Osteoporosis Screening  07/14/2019     Annual Wellness Visit  08/29/2019     Pneumococcal Vaccine (2 of 2 - PPSV23) 08/29/2019

## 2019-09-03 ENCOUNTER — ANCILLARY PROCEDURE (OUTPATIENT)
Dept: MAMMOGRAPHY | Facility: OTHER | Age: 64
End: 2019-09-03
Attending: FAMILY MEDICINE
Payer: MEDICARE

## 2019-09-03 ENCOUNTER — OFFICE VISIT (OUTPATIENT)
Dept: FAMILY MEDICINE | Facility: OTHER | Age: 64
End: 2019-09-03
Attending: FAMILY MEDICINE
Payer: COMMERCIAL

## 2019-09-03 VITALS
HEART RATE: 78 BPM | OXYGEN SATURATION: 98 % | TEMPERATURE: 97.7 F | SYSTOLIC BLOOD PRESSURE: 124 MMHG | DIASTOLIC BLOOD PRESSURE: 72 MMHG | RESPIRATION RATE: 19 BRPM | WEIGHT: 178 LBS | BODY MASS INDEX: 28.73 KG/M2

## 2019-09-03 DIAGNOSIS — G25.81 RESTLESS LEG SYNDROME: ICD-10-CM

## 2019-09-03 DIAGNOSIS — Z00.00 ROUTINE GENERAL MEDICAL EXAMINATION AT A HEALTH CARE FACILITY: Primary | ICD-10-CM

## 2019-09-03 DIAGNOSIS — T46.4X5A ACE-INHIBITOR COUGH: ICD-10-CM

## 2019-09-03 DIAGNOSIS — Z23 IMMUNIZATION DUE: ICD-10-CM

## 2019-09-03 DIAGNOSIS — E28.39 ESTROGEN DEFICIENCY: ICD-10-CM

## 2019-09-03 DIAGNOSIS — E78.5 HYPERLIPIDEMIA WITH TARGET LDL LESS THAN 130: ICD-10-CM

## 2019-09-03 DIAGNOSIS — E61.1 IRON DEFICIENCY: ICD-10-CM

## 2019-09-03 DIAGNOSIS — Z12.39 BREAST SCREENING, UNSPECIFIED: ICD-10-CM

## 2019-09-03 DIAGNOSIS — R05.8 ACE-INHIBITOR COUGH: ICD-10-CM

## 2019-09-03 DIAGNOSIS — M81.0 AGE-RELATED OSTEOPOROSIS WITHOUT CURRENT PATHOLOGICAL FRACTURE: ICD-10-CM

## 2019-09-03 DIAGNOSIS — I10 BENIGN ESSENTIAL HYPERTENSION: ICD-10-CM

## 2019-09-03 PROBLEM — L57.0 AK (ACTINIC KERATOSIS): Status: ACTIVE | Noted: 2019-05-08

## 2019-09-03 PROCEDURE — 99397 PER PM REEVAL EST PAT 65+ YR: CPT | Performed by: FAMILY MEDICINE

## 2019-09-03 PROCEDURE — 77063 BREAST TOMOSYNTHESIS BI: CPT | Mod: TC

## 2019-09-03 RX ORDER — ASPIRIN 81 MG/1
81 TABLET ORAL DAILY
COMMUNITY

## 2019-09-03 ASSESSMENT — ANXIETY QUESTIONNAIRES
1. FEELING NERVOUS, ANXIOUS, OR ON EDGE: NOT AT ALL
GAD7 TOTAL SCORE: 0
7. FEELING AFRAID AS IF SOMETHING AWFUL MIGHT HAPPEN: NOT AT ALL
4. TROUBLE RELAXING: NOT AT ALL
3. WORRYING TOO MUCH ABOUT DIFFERENT THINGS: NOT AT ALL
6. BECOMING EASILY ANNOYED OR IRRITABLE: NOT AT ALL
IF YOU CHECKED OFF ANY PROBLEMS ON THIS QUESTIONNAIRE, HOW DIFFICULT HAVE THESE PROBLEMS MADE IT FOR YOU TO DO YOUR WORK, TAKE CARE OF THINGS AT HOME, OR GET ALONG WITH OTHER PEOPLE: NOT DIFFICULT AT ALL
5. BEING SO RESTLESS THAT IT IS HARD TO SIT STILL: NOT AT ALL
2. NOT BEING ABLE TO STOP OR CONTROL WORRYING: NOT AT ALL

## 2019-09-03 ASSESSMENT — PATIENT HEALTH QUESTIONNAIRE - PHQ9: SUM OF ALL RESPONSES TO PHQ QUESTIONS 1-9: 0

## 2019-09-03 ASSESSMENT — PAIN SCALES - GENERAL: PAINLEVEL: NO PAIN (0)

## 2019-09-03 NOTE — NURSING NOTE
"Chief Complaint   Patient presents with     Physical       Initial /72   Pulse 78   Temp 97.7  F (36.5  C) (Tympanic)   Resp 19   Wt 80.7 kg (178 lb)   SpO2 98%   BMI 28.73 kg/m   Estimated body mass index is 28.73 kg/m  as calculated from the following:    Height as of 9/7/17: 1.676 m (5' 6\").    Weight as of this encounter: 80.7 kg (178 lb).  Medication Reconciliation: complete   Moon Jalloh LPN      "

## 2019-09-04 ASSESSMENT — ANXIETY QUESTIONNAIRES: GAD7 TOTAL SCORE: 0

## 2019-09-09 ENCOUNTER — ALLIED HEALTH/NURSE VISIT (OUTPATIENT)
Dept: FAMILY MEDICINE | Facility: OTHER | Age: 64
End: 2019-09-09
Attending: FAMILY MEDICINE
Payer: MEDICARE

## 2019-09-09 ENCOUNTER — HOSPITAL ENCOUNTER (OUTPATIENT)
Dept: BONE DENSITY | Facility: HOSPITAL | Age: 64
Discharge: HOME | End: 2019-09-09
Attending: FAMILY MEDICINE | Admitting: FAMILY MEDICINE
Payer: MEDICARE

## 2019-09-09 DIAGNOSIS — Z23 IMMUNIZATION DUE: Primary | ICD-10-CM

## 2019-09-09 DIAGNOSIS — E61.1 IRON DEFICIENCY: ICD-10-CM

## 2019-09-09 DIAGNOSIS — I10 BENIGN ESSENTIAL HYPERTENSION: ICD-10-CM

## 2019-09-09 DIAGNOSIS — E28.39 ESTROGEN DEFICIENCY: ICD-10-CM

## 2019-09-09 DIAGNOSIS — Z00.00 ROUTINE GENERAL MEDICAL EXAMINATION AT A HEALTH CARE FACILITY: ICD-10-CM

## 2019-09-09 LAB
ANION GAP SERPL CALCULATED.3IONS-SCNC: 7 MMOL/L (ref 3–14)
BUN SERPL-MCNC: 22 MG/DL (ref 7–30)
CALCIUM SERPL-MCNC: 9.1 MG/DL (ref 8.5–10.1)
CHLORIDE SERPL-SCNC: 108 MMOL/L (ref 94–109)
CHOLEST SERPL-MCNC: 165 MG/DL
CO2 SERPL-SCNC: 25 MMOL/L (ref 20–32)
CREAT SERPL-MCNC: 0.79 MG/DL (ref 0.52–1.04)
FERRITIN SERPL-MCNC: 69 NG/ML (ref 8–252)
GFR SERPL CREATININE-BSD FRML MDRD: 78 ML/MIN/{1.73_M2}
GLUCOSE SERPL-MCNC: 97 MG/DL (ref 70–99)
HDLC SERPL-MCNC: 56 MG/DL
IRON SATN MFR SERPL: 18 % (ref 15–46)
IRON SERPL-MCNC: 72 UG/DL (ref 35–180)
LDLC SERPL CALC-MCNC: 94 MG/DL
NONHDLC SERPL-MCNC: 109 MG/DL
POTASSIUM SERPL-SCNC: 4.8 MMOL/L (ref 3.4–5.3)
SODIUM SERPL-SCNC: 140 MMOL/L (ref 133–144)
TIBC SERPL-MCNC: 407 UG/DL (ref 240–430)
TRIGL SERPL-MCNC: 73 MG/DL

## 2019-09-09 PROCEDURE — 83540 ASSAY OF IRON: CPT | Mod: ZL | Performed by: FAMILY MEDICINE

## 2019-09-09 PROCEDURE — 80048 BASIC METABOLIC PNL TOTAL CA: CPT | Mod: ZL | Performed by: FAMILY MEDICINE

## 2019-09-09 PROCEDURE — 80061 LIPID PANEL: CPT | Mod: ZL | Performed by: FAMILY MEDICINE

## 2019-09-09 PROCEDURE — 36415 COLL VENOUS BLD VENIPUNCTURE: CPT | Mod: ZL | Performed by: FAMILY MEDICINE

## 2019-09-09 PROCEDURE — 77080 DXA BONE DENSITY AXIAL: CPT | Mod: TC

## 2019-09-09 PROCEDURE — 83550 IRON BINDING TEST: CPT | Mod: ZL | Performed by: FAMILY MEDICINE

## 2019-09-09 PROCEDURE — 82728 ASSAY OF FERRITIN: CPT | Mod: ZL | Performed by: FAMILY MEDICINE

## 2019-09-13 DIAGNOSIS — G25.81 RESTLESS LEGS SYNDROME (RLS): ICD-10-CM

## 2019-09-13 RX ORDER — PRAMIPEXOLE DIHYDROCHLORIDE 0.5 MG/1
TABLET ORAL
Qty: 4 TABLET | Refills: 0 | Status: SHIPPED | OUTPATIENT
Start: 2019-09-13 | End: 2021-11-22

## 2019-09-15 DIAGNOSIS — E78.5 HYPERLIPIDEMIA LDL GOAL <130: ICD-10-CM

## 2019-09-16 RX ORDER — ROSUVASTATIN CALCIUM 5 MG/1
5 TABLET, COATED ORAL DAILY
Qty: 90 TABLET | Refills: 0 | Status: SHIPPED | OUTPATIENT
Start: 2019-09-16 | End: 2019-12-23

## 2019-10-09 ENCOUNTER — TELEPHONE (OUTPATIENT)
Dept: FAMILY MEDICINE | Facility: OTHER | Age: 64
End: 2019-10-09

## 2019-10-09 DIAGNOSIS — G47.33 OSA (OBSTRUCTIVE SLEEP APNEA): Primary | ICD-10-CM

## 2019-10-09 NOTE — TELEPHONE ENCOUNTER
Patient would like a prescription written for an oral appliance for her sleep apnea. Patient is requesting the prescription to be mailed to her if possible. Please advise.

## 2019-10-29 DIAGNOSIS — M81.0 OSTEOPOROSIS: ICD-10-CM

## 2019-10-29 RX ORDER — ALENDRONATE SODIUM 70 MG/1
TABLET ORAL
Qty: 12 TABLET | Refills: 0 | Status: SHIPPED | OUTPATIENT
Start: 2019-10-29 | End: 2020-02-26

## 2019-12-23 DIAGNOSIS — E78.5 HYPERLIPIDEMIA LDL GOAL <130: ICD-10-CM

## 2019-12-23 DIAGNOSIS — G25.81 RESTLESS LEGS SYNDROME (RLS): ICD-10-CM

## 2019-12-23 RX ORDER — ROSUVASTATIN CALCIUM 5 MG/1
TABLET, COATED ORAL
Qty: 90 TABLET | Refills: 0 | Status: SHIPPED | OUTPATIENT
Start: 2019-12-23 | End: 2020-03-30

## 2019-12-23 RX ORDER — PRAMIPEXOLE DIHYDROCHLORIDE 0.5 MG/1
TABLET ORAL
Qty: 90 TABLET | Refills: 1 | Status: SHIPPED | OUTPATIENT
Start: 2019-12-23 | End: 2020-03-06

## 2019-12-23 NOTE — TELEPHONE ENCOUNTER
Pramipexole 0.5mg   Last Written Prescription Date:  09/13/19  Last Fill Quantity: 4 tablets,   # refills: 0  Last Office Visit:09/03/19  Future Office visit:       Rosuvastatin 5mg     Last Written Prescription Date:  09/16/19  Last Fill Quantity: 90,   # refills: 0  Last Office Visit: 09/03/19  Future Office visit:       Routing refill request to provider for review/approval because:  Drug not on the [facility] or [facility] refill protocol or controlled substance

## 2020-02-15 DIAGNOSIS — I10 BENIGN ESSENTIAL HYPERTENSION: ICD-10-CM

## 2020-02-18 RX ORDER — LISINOPRIL 10 MG/1
10 TABLET ORAL DAILY
Qty: 90 TABLET | Refills: 1 | Status: SHIPPED | OUTPATIENT
Start: 2020-02-18 | End: 2020-08-27

## 2020-02-26 DIAGNOSIS — M81.0 OSTEOPOROSIS: ICD-10-CM

## 2020-02-26 RX ORDER — ALENDRONATE SODIUM 70 MG/1
TABLET ORAL
Qty: 12 TABLET | Refills: 0 | Status: SHIPPED | OUTPATIENT
Start: 2020-02-26 | End: 2020-06-04

## 2020-02-27 ENCOUNTER — ALLIED HEALTH/NURSE VISIT (OUTPATIENT)
Dept: FAMILY MEDICINE | Facility: OTHER | Age: 65
End: 2020-02-27
Attending: FAMILY MEDICINE
Payer: MEDICARE

## 2020-02-27 DIAGNOSIS — Z23 NEED FOR VACCINATION: Primary | ICD-10-CM

## 2020-02-27 PROCEDURE — 90471 IMMUNIZATION ADMIN: CPT

## 2020-02-27 PROCEDURE — 90750 HZV VACC RECOMBINANT IM: CPT

## 2020-02-27 NOTE — PROGRESS NOTES
Patient here for Shingrix vaccine (2nd dose). Patient brought her own vaccine that she picked up from the pharmacy to be administered. Ashley A. Lechevalier, LPN on 2/27/2020 at 1:25 PM    Clinic Administered Medication Documentation      Injectable Medication Documentation    Patient was given Shingles Vaccine. Prior to medication administration, verified patients identity using patient s name and date of birth. Please see MAR and medication order for additional information.      Was entire vial of medication used? Yes  Vial/Syringe: Single dose vial  Expiration Date:  05/10/2022  Was this medication supplied by the patient? Yes, Medication was received directly from patient (follow site specific policies)

## 2020-03-03 DIAGNOSIS — G25.81 RESTLESS LEGS SYNDROME (RLS): ICD-10-CM

## 2020-03-06 RX ORDER — PRAMIPEXOLE DIHYDROCHLORIDE 0.5 MG/1
TABLET ORAL
Qty: 90 TABLET | Refills: 1 | Status: SHIPPED | OUTPATIENT
Start: 2020-03-06 | End: 2020-05-22

## 2020-03-06 NOTE — TELEPHONE ENCOUNTER
Mirapex      Last Written Prescription Date:  12-  Last Fill Quantity: 90,   # refills: 1  Last Office Visit: 9-  Future Office visit:       Routing refill request to provider for review/approval because:

## 2020-03-29 DIAGNOSIS — E78.5 HYPERLIPIDEMIA LDL GOAL <130: ICD-10-CM

## 2020-03-30 RX ORDER — ROSUVASTATIN CALCIUM 5 MG/1
TABLET, COATED ORAL
Qty: 90 TABLET | Refills: 0 | Status: SHIPPED | OUTPATIENT
Start: 2020-03-30 | End: 2020-07-06

## 2020-03-30 NOTE — TELEPHONE ENCOUNTER
Crestor  Last Written Prescription Date: 12/23/19  Last Fill Quantity: 90 # of Refills: 0  Last Office Visit: 9/3/19

## 2020-05-20 DIAGNOSIS — G25.81 RESTLESS LEGS SYNDROME (RLS): ICD-10-CM

## 2020-05-21 NOTE — TELEPHONE ENCOUNTER
Mirapex      Last Written Prescription Date:  03/06/20  Last Fill Quantity: 90,   # refills: 1  Last Office Visit: 09/03/19  Future Office visit:

## 2020-05-22 RX ORDER — PRAMIPEXOLE DIHYDROCHLORIDE 0.5 MG/1
TABLET ORAL
Qty: 90 TABLET | Refills: 1 | Status: SHIPPED | OUTPATIENT
Start: 2020-05-22 | End: 2020-08-19

## 2020-05-22 NOTE — TELEPHONE ENCOUNTER
Failed protocol due to    CBC on record in past 12 months Protocol Details    ALT on record in past 12 months      Recent (6 mo) or future (30 days) visit within the authorizing provider's specialty      Lab Results   Component Value Date    WBC 6.0 12/24/2018     Lab Results   Component Value Date    RBC 4.90 12/24/2018     Lab Results   Component Value Date    HGB 14.6 12/24/2018     Lab Results   Component Value Date    HCT 44.3 12/24/2018     No components found for: MCT  Lab Results   Component Value Date    MCV 90 12/24/2018     Lab Results   Component Value Date    MCH 29.8 12/24/2018     Lab Results   Component Value Date    MCHC 33.0 12/24/2018     Lab Results   Component Value Date    RDW 13.6 12/24/2018     Lab Results   Component Value Date     12/24/2018     Lab Results   Component Value Date    ALT 30 12/24/2018

## 2020-06-04 DIAGNOSIS — M81.0 OSTEOPOROSIS: ICD-10-CM

## 2020-06-04 RX ORDER — ALENDRONATE SODIUM 70 MG/1
TABLET ORAL
Qty: 12 TABLET | Refills: 0 | Status: SHIPPED | OUTPATIENT
Start: 2020-06-04 | End: 2020-09-23

## 2020-07-06 DIAGNOSIS — E78.5 HYPERLIPIDEMIA LDL GOAL <130: ICD-10-CM

## 2020-07-06 RX ORDER — ROSUVASTATIN CALCIUM 5 MG/1
TABLET, COATED ORAL
Qty: 90 TABLET | Refills: 0 | Status: SHIPPED | OUTPATIENT
Start: 2020-07-06 | End: 2020-10-19

## 2020-08-01 ENCOUNTER — TRANSFERRED RECORDS (OUTPATIENT)
Dept: HEALTH INFORMATION MANAGEMENT | Facility: CLINIC | Age: 65
End: 2020-08-01

## 2020-08-16 DIAGNOSIS — G25.81 RESTLESS LEGS SYNDROME (RLS): ICD-10-CM

## 2020-08-18 NOTE — TELEPHONE ENCOUNTER
mirapex  Last Written Prescription Date: 5/22/2020  Last Fill Quantity: 90 # of Refills: 1  Last Office Visit: 9/3/2019

## 2020-08-19 RX ORDER — PRAMIPEXOLE DIHYDROCHLORIDE 0.5 MG/1
TABLET ORAL
Qty: 90 TABLET | Refills: 1 | Status: SHIPPED | OUTPATIENT
Start: 2020-08-19 | End: 2020-11-10

## 2020-09-21 DIAGNOSIS — M81.0 AGE RELATED OSTEOPOROSIS, UNSPECIFIED PATHOLOGICAL FRACTURE PRESENCE: ICD-10-CM

## 2020-09-21 DIAGNOSIS — Z79.899 MEDICATION MANAGEMENT: Primary | ICD-10-CM

## 2020-09-22 ENCOUNTER — ANCILLARY PROCEDURE (OUTPATIENT)
Dept: MAMMOGRAPHY | Facility: OTHER | Age: 65
End: 2020-09-22
Attending: FAMILY MEDICINE
Payer: MEDICARE

## 2020-09-22 DIAGNOSIS — Z12.31 VISIT FOR SCREENING MAMMOGRAM: ICD-10-CM

## 2020-09-22 PROCEDURE — 77067 SCR MAMMO BI INCL CAD: CPT | Mod: TC

## 2020-09-23 RX ORDER — ALENDRONATE SODIUM 70 MG/1
TABLET ORAL
Qty: 12 TABLET | Refills: 0 | Status: SHIPPED | OUTPATIENT
Start: 2020-09-23 | End: 2021-01-13

## 2020-09-23 NOTE — TELEPHONE ENCOUNTER
Pt of -does have upcoming appt,    Bisphosphonates [id number]/23/2020 09:02 AM   Normal serum creatinine on file within past 12 months     Creatinine   Date Value Ref Range Status   09/09/2019 0.79 0.52 - 1.04 mg/dL Final     Placed a CMP.    Felicity Coley RN

## 2020-09-23 NOTE — TELEPHONE ENCOUNTER
Fosamax      Last Written Prescription Date:  06/04/20  Last Fill Quantity: 12,   # refills: 0  Last Office Visit: 09/03/19  Future Office visit:    Next 5 appointments (look out 90 days)    Oct 13, 2020  9:15 AM CDT  (Arrive by 9:00 AM)  PHYSICAL with Mariela Dolan MD  [facility] ([facility] ) [address]  [place] [address]  [phone]           Routing refill request to provider for review/approval because:

## 2020-10-07 NOTE — PROGRESS NOTES
"SUBJECTIVE:   Diana Ware is a 65 year old female who presents for Preventive Visit.      Patient has been advised of split billing requirements and indicates understanding: Yes  Are you in the first 12 months of your Medicare Part B coverage?  No    Physical Health:    In general, how would you rate your overall physical health? good    Outside of work, how many days during the week do you exercise? 4-5 days/week    Outside of work, approximately how many minutes a day do you exercise?30-45 minutes    If you drink alcohol do you typically have >3 drinks per day or >7 drinks per week? No    Do you usually eat at least 4 servings of fruit and vegetables a day, include whole grains & fiber and avoid regularly eating high fat or \"junk\" foods? Yes    Do you have any problems taking medications regularly?  No    Do you have any side effects from medications? none    Needs assistance for the following daily activities: no assistance needed    Which of the following safety concerns are present in your home?  none identified     Hearing impairment: No    In the past 6 months, have you been bothered by leaking of urine? no    Mental Health:    In general, how would you rate your overall mental or emotional health? good  PHQ-2 Score:      Do you feel safe in your environment? Yes    Have you ever done Advance Care Planning? (For example, a Health Directive, POLST, or a discussion with a medical provider or your loved ones about your wishes): No, advance care planning information given to patient to review.  Patient plans to discuss their wishes with loved ones or provider.      Additional concerns to address?  No    Fall risk:  Fallen 2 or more times in the past year?: No  Any fall with injury in the past year?: No    Do you have sleep apnea, excessive snoring or daytime drowsiness?: no        Restless leg  She is taking iron three times daily which has been very helpful for her symptoms. Her Mirapex has been lowered after " she was seen at [facility]. She is doing well with this     Reviewed and updated as needed this visit by clinical staff  Tobacco  Allergies  Meds   Med Hx  Surg Hx  Fam Hx  Soc Hx        Reviewed and updated as needed this visit by Provider                Social History     Tobacco Use     Smoking status: Never Smoker     Smokeless tobacco: Never Used   Substance Use Topics     Alcohol use: Yes     Comment: Socially                           Current providers sharing in care for this patient include:   Patient Care Team:  Mariela Dolan MD as PCP - General  Mariela Dolan MD as Assigned PCP    The following health maintenance items are reviewed in Epic and correct as of today:  Health Maintenance   Topic Date Due     Pneumococcal Vaccine: 65+ Years (2 of 2 - PPSV23) 08/29/2019     PHQ-2  01/01/2020     FALL RISK ASSESSMENT  02/27/2021     DEXA  09/09/2021     MEDICARE ANNUAL WELLNESS VISIT  10/13/2021     DTAP/TDAP/TD IMMUNIZATION (4 - Td) 01/23/2022     MAMMO SCREENING  09/22/2022     ADVANCE CARE PLANNING  03/12/2024     LIPID  09/09/2024     COLORECTAL CANCER SCREENING  10/23/2024     INFLUENZA VACCINE  Completed     ZOSTER IMMUNIZATION  Completed     HEPATITIS C SCREENING  Addressed     Pneumococcal Vaccine: Pediatrics (0 to 5 Years) and At-Risk Patients (6 to 64 Years)  Aged Out     IPV IMMUNIZATION  Aged Out     MENINGITIS IMMUNIZATION  Aged Out     HEPATITIS B IMMUNIZATION  Aged Out     BP Readings from Last 3 Encounters:   10/13/20 126/74   09/03/19 124/72   03/12/19 132/74    Wt Readings from Last 3 Encounters:   10/13/20 74.8 kg (165 lb)   09/03/19 80.7 kg (178 lb)   03/12/19 80.7 kg (178 lb)                  Patient Active Problem List   Diagnosis     Advanced care planning/counseling discussion     Osteoporosis     Dysplastic Pap smear     Hyperlipidemia with target LDL less than 130     Migraine     Arthritis of knee, left     Restless leg syndrome     Fracture of foot bone without toes, closed      Injury of right lateral plantar nerve     Plantar fasciitis     Corneal erosion     Decreased mobility     Left leg weakness     Benign essential hypertension     History of total left knee replacement     AK (actinic keratosis)     ACE-inhibitor cough     Iron deficiency     Past Surgical History:   Procedure Laterality Date     APPENDECTOMY  1967     ARTHROPLASTY KNEE Left 01/23/2019    Dr Edwards     ARTHROSCOPY KNEE  2000    Left knee; medial meniscal tear     ARTHROSCOPY KNEE  2010    RT     COLONOSCOPY  2004    benign polyp, repeat 2014     COLONOSCOPY N/A 10/17/2014    Procedure: COLONOSCOPY;  Surgeon: Renzo Kearney MD;  Location: [facility]     FRACTURE TX, WRIST RT/LT Left 01/2017    plate and screws placed     LAPAROSCOPY DIAGNOSTIC (GENERAL)  1992    adhesions     ORTHOPEDIC SURGERY  2017    ORIF of left distal radius using DVR Biomet Plate     SALPINGECTOMY  1967    left salpingectomy, left oophorectomy; Teratoma     STRESS TEST  2010    Chest pain; negative, in [place]     SURGICAL PATHOLOGY EXAM  05/23/2013    [facility]opsy, [facility] for 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, no dysplasia noted on [facility] biopsy     ZZ STRESS THALLIUM TEST  2003    Chest pain; negative       Social History     Tobacco Use     Smoking status: Never Smoker     Smokeless tobacco: Never Used   Substance Use Topics     Alcohol use: Yes     Comment: Socially     Family History   Problem Relation Age of Onset     Heart Disease Mother      Heart Disease Father      Cerebrovascular Disease Father      Heart Disease Maternal Grandmother      Diabetes Paternal Grandmother      Breast Cancer Maternal Aunt          Current Outpatient Medications   Medication Sig Dispense Refill     alendronate (FOSAMAX) 70 MG tablet TAKE 1 TABLET BY MOUTH ONCE WEEKLY ON AN EMPTY STOMACH 12 tablet 0     aspirin 81 MG EC tablet Take 81 mg by mouth daily       Calcium Carbonate-Vit D-Min (CALCIUM 1200 PO)        ferrous fumarate 65 mg, [place]. FE,-Vitamin  "C 125 mg (VITRON-C)  MG TABS tablet Take 1 tablet by mouth 3 times daily       IBUPROFEN PO Take 400 mg by mouth every 6 hours as needed for moderate pain       lisinopril (ZESTRIL) 10 MG tablet TAKE 1 TABLET (10 MG) BY MOUTH DAILY 90 tablet 1     Multiple Vitamins-Minerals (WOMENS MULTIVITAMIN PLUS PO) Take by mouth daily       order for DME Equipment being ordered: Custom oral appliance for AVIVA 1 Units 0     pramipexole (MIRAPEX) 0.5 MG tablet TAKE 1 OR 2 TABLETS BY MOUTH 2-3 HOURS BEFORE BEDTIME 90 tablet 1     pramipexole (MIRAPEX) 0.5 MG tablet TAKE 1 OR 2 TABLETS BY MOUTH 2-3 HOURS BEFORE BEDTIME 4 tablet 0     rosuvastatin (CRESTOR) 5 MG tablet TAKE 1 TABLET BY MOUTH DAILY 90 tablet 0     Allergies   Allergen Reactions     Simvastatin Other (See Comments)     Myalgias and elevated CPK     Mammogram Screening: done in September    ROS:  Constitutional, HEENT, cardiovascular, pulmonary, GI, , musculoskeletal, neuro, skin, endocrine and psych systems are negative, except as otherwise noted.    OBJECTIVE:   /74   Pulse 84   Temp 98.4  F (36.9  C) (Tympanic)   Resp 19   Ht 1.676 m (5' 6\")   Wt 74.8 kg (165 lb)   SpO2 98%   BMI 26.63 kg/m   Estimated body mass index is 26.63 kg/m  as calculated from the following:    Height as of this encounter: 1.676 m (5' 6\").    Weight as of this encounter: 74.8 kg (165 lb).  EXAM:   GENERAL APPEARANCE: healthy, alert and no distress  EYES: Eyes grossly normal to inspection, PERRL and conjunctivae and sclerae normal  HENT: ear canals and TM's normal, nose and mouth without ulcers or lesions, oropharynx clear and oral mucous membranes moist  NECK: no adenopathy, no asymmetry, masses, or scars and thyroid normal to palpation  RESP: lungs clear to auscultation - no rales, rhonchi or wheezes  BREAST: normal without masses, tenderness or nipple discharge and no palpable axillary masses or adenopathy  CV: regular rate and rhythm, normal S1 S2, no S3 or S4, no " "murmur, click or rub, no peripheral edema and peripheral pulses strong  ABDOMEN: soft, nontender, no hepatosplenomegaly, no masses and bowel sounds normal  MS: no musculoskeletal defects are noted and gait is age appropriate without ataxia  SKIN: no suspicious lesions or rashes  NEURO: Normal strength and tone, sensory exam grossly normal, mentation intact and speech normal  PSYCH: mentation appears normal and affect normal/bright        ASSESSMENT / PLAN:   1. Routine general medical examination at a health care facility  Mammogram completed in September     2. Benign essential hypertension  Good control  Continue current medications  Check labs   - CBC with platelets  - Comprehensive metabolic panel    3. Hyperlipidemia with target LDL less than 130  Due for labs   - Lipid Profile    4. Restless legs syndrome (RLS)  Controlled           COUNSELING:  Reviewed preventive health counseling, as reflected in patient instructions       Regular exercise       Healthy diet/nutrition       immunized for influenza at [facility] in [place], 2 weeks ago     Estimated body mass index is 26.63 kg/m  as calculated from the following:    Height as of this encounter: 1.676 m (5' 6\").    Weight as of this encounter: 74.8 kg (165 lb).        She reports that she has never smoked. She has never used smokeless tobacco.    Appropriate preventive services were discussed with this patient, including applicable screening as appropriate for cardiovascular disease, diabetes, osteopenia/osteoporosis, and glaucoma.  As appropriate for age/gender, discussed screening for colorectal cancer, prostate cancer, breast cancer, and cervical cancer. Checklist reviewing preventive services available has been given to the patient.    Reviewed patients plan of care and provided an AVS. The Basic Care Plan (routine screening as documented in Health Maintenance) for Diana meets the Care Plan requirement. This Care Plan has been established and reviewed with " the Patient.    Counseling Resources:  ATP IV Guidelines  Pooled Cohorts Equation Calculator  Breast Cancer Risk Calculator  BRCA-Related Cancer Risk Assessment: FHS-7 Tool  FRAX Risk Assessment  ICSI Preventive Guidelines  Dietary Guidelines for Americans, 2010  USDA's MyPlate  ASA Prophylaxis  Lung CA Screening    Mariela Dolan MD  [facility]

## 2020-10-07 NOTE — PATIENT INSTRUCTIONS
Preventive Health Recommendations    See your health care provider every year to    Review health changes.     Discuss preventive care.      Review your medicines if your doctor has prescribed any.      You no longer need a yearly Pap test unless you've had an abnormal Pap test in the past 10 years. If you have vaginal symptoms, such as bleeding or discharge, be sure to talk with your provider about a Pap test.      Every 1 to 2 years, have a mammogram.  If you are over 69, talk with your health care provider about whether or not you want to continue having screening mammograms.      Every 10 years, have a colonoscopy. Or, have a yearly FIT test (stool test). These exams will check for colon cancer.       Have a cholesterol test every 5 years, or more often if your doctor advises it.       Have a diabetes test (fasting glucose) every three years. If you are at risk for diabetes, you should have this test more often.       At age 65, have a bone density scan (DEXA) to check for osteoporosis (brittle bone disease).    Shots:    Get a flu shot each year.    Get a tetanus shot every 10 years.    Talk to your doctor about your pneumonia vaccines. There are now two you should receive - Pneumovax (PPSV 23) and Prevnar (PCV 13).    Talk to your pharmacist about the shingles vaccine.    Talk to your doctor about the hepatitis B vaccine.    Nutrition:     Eat at least 5 servings of fruits and vegetables each day.      Eat whole-grain bread, whole-wheat pasta and brown rice instead of white grains and rice.      Get adequate about Calcium and Vitamin D.     Lifestyle    Exercise at least 150 minutes a week (30 minutes a day, 5 days a week). This will help you control your weight and prevent disease.      Limit alcohol to one drink per day.      No smoking.       Wear sunscreen to prevent skin cancer.       See your dentist twice a year for an exam and cleaning.      See your eye doctor every 1 to 2 years to screen for  conditions such as glaucoma, macular degeneration, cataracts, etc.    Personalized Prevention Plan  You are due for the preventive services outlined below.  Your care team is available to assist you in scheduling these services.  If you have already completed any of these items, please share that information with your care team to update in your medical record.    Health Maintenance Due   Topic Date Due     Pneumococcal Vaccine (2 of 2 - PPSV23) 08/29/2019     PHQ-2  01/01/2020     Flu Vaccine (1) 09/01/2020     Annual Wellness Visit  09/03/2020

## 2020-10-13 ENCOUNTER — OFFICE VISIT (OUTPATIENT)
Dept: FAMILY MEDICINE | Facility: OTHER | Age: 65
End: 2020-10-13
Attending: FAMILY MEDICINE
Payer: COMMERCIAL

## 2020-10-13 VITALS
TEMPERATURE: 98.4 F | BODY MASS INDEX: 26.52 KG/M2 | HEART RATE: 84 BPM | DIASTOLIC BLOOD PRESSURE: 74 MMHG | HEIGHT: 66 IN | RESPIRATION RATE: 19 BRPM | WEIGHT: 165 LBS | OXYGEN SATURATION: 98 % | SYSTOLIC BLOOD PRESSURE: 126 MMHG

## 2020-10-13 DIAGNOSIS — E78.5 HYPERLIPIDEMIA WITH TARGET LDL LESS THAN 130: ICD-10-CM

## 2020-10-13 DIAGNOSIS — G25.81 RESTLESS LEGS SYNDROME (RLS): ICD-10-CM

## 2020-10-13 DIAGNOSIS — Z00.00 ROUTINE GENERAL MEDICAL EXAMINATION AT A HEALTH CARE FACILITY: Primary | ICD-10-CM

## 2020-10-13 DIAGNOSIS — I10 BENIGN ESSENTIAL HYPERTENSION: ICD-10-CM

## 2020-10-13 LAB
ALBUMIN SERPL-MCNC: 3.8 G/DL (ref 3.4–5)
ALP SERPL-CCNC: 65 U/L (ref 40–150)
ALT SERPL W P-5'-P-CCNC: 34 U/L (ref 0–50)
ANION GAP SERPL CALCULATED.3IONS-SCNC: 4 MMOL/L (ref 3–14)
AST SERPL W P-5'-P-CCNC: 17 U/L (ref 0–45)
BILIRUB SERPL-MCNC: 0.6 MG/DL (ref 0.2–1.3)
BUN SERPL-MCNC: 18 MG/DL (ref 7–30)
CALCIUM SERPL-MCNC: 9.3 MG/DL (ref 8.5–10.1)
CHLORIDE SERPL-SCNC: 105 MMOL/L (ref 94–109)
CHOLEST SERPL-MCNC: 168 MG/DL
CO2 SERPL-SCNC: 28 MMOL/L (ref 20–32)
CREAT SERPL-MCNC: 0.73 MG/DL (ref 0.52–1.04)
ERYTHROCYTE [DISTWIDTH] IN BLOOD BY AUTOMATED COUNT: 13.2 % (ref 10–15)
GFR SERPL CREATININE-BSD FRML MDRD: 85 ML/MIN/{1.73_M2}
GLUCOSE SERPL-MCNC: 93 MG/DL (ref 70–99)
HCT VFR BLD AUTO: 43.8 % (ref 35–47)
HDLC SERPL-MCNC: 52 MG/DL
HGB BLD-MCNC: 14.3 G/DL (ref 11.7–15.7)
LDLC SERPL CALC-MCNC: 96 MG/DL
MCH RBC QN AUTO: 29.3 PG (ref 26.5–33)
MCHC RBC AUTO-ENTMCNC: 32.6 G/DL (ref 31.5–36.5)
MCV RBC AUTO: 90 FL (ref 78–100)
NONHDLC SERPL-MCNC: 116 MG/DL
PLATELET # BLD AUTO: 215 10E9/L (ref 150–450)
POTASSIUM SERPL-SCNC: 4.3 MMOL/L (ref 3.4–5.3)
PROT SERPL-MCNC: 7.3 G/DL (ref 6.8–8.8)
RBC # BLD AUTO: 4.88 10E12/L (ref 3.8–5.2)
SODIUM SERPL-SCNC: 137 MMOL/L (ref 133–144)
TRIGL SERPL-MCNC: 101 MG/DL
WBC # BLD AUTO: 5 10E9/L (ref 4–11)

## 2020-10-13 PROCEDURE — 36415 COLL VENOUS BLD VENIPUNCTURE: CPT | Mod: ZL | Performed by: FAMILY MEDICINE

## 2020-10-13 PROCEDURE — G0438 PPPS, INITIAL VISIT: HCPCS | Performed by: FAMILY MEDICINE

## 2020-10-13 PROCEDURE — 80061 LIPID PANEL: CPT | Mod: ZL | Performed by: FAMILY MEDICINE

## 2020-10-13 PROCEDURE — 85027 COMPLETE CBC AUTOMATED: CPT | Mod: ZL | Performed by: FAMILY MEDICINE

## 2020-10-13 PROCEDURE — 80053 COMPREHEN METABOLIC PANEL: CPT | Mod: ZL | Performed by: FAMILY MEDICINE

## 2020-10-13 ASSESSMENT — PATIENT HEALTH QUESTIONNAIRE - PHQ9
SUM OF ALL RESPONSES TO PHQ QUESTIONS 1-9: 0
5. POOR APPETITE OR OVEREATING: NOT AT ALL

## 2020-10-13 ASSESSMENT — ANXIETY QUESTIONNAIRES
GAD7 TOTAL SCORE: 0
3. WORRYING TOO MUCH ABOUT DIFFERENT THINGS: NOT AT ALL
IF YOU CHECKED OFF ANY PROBLEMS ON THIS QUESTIONNAIRE, HOW DIFFICULT HAVE THESE PROBLEMS MADE IT FOR YOU TO DO YOUR WORK, TAKE CARE OF THINGS AT HOME, OR GET ALONG WITH OTHER PEOPLE: NOT DIFFICULT AT ALL
1. FEELING NERVOUS, ANXIOUS, OR ON EDGE: NOT AT ALL
7. FEELING AFRAID AS IF SOMETHING AWFUL MIGHT HAPPEN: NOT AT ALL
2. NOT BEING ABLE TO STOP OR CONTROL WORRYING: NOT AT ALL
6. BECOMING EASILY ANNOYED OR IRRITABLE: NOT AT ALL
5. BEING SO RESTLESS THAT IT IS HARD TO SIT STILL: NOT AT ALL

## 2020-10-13 ASSESSMENT — PAIN SCALES - GENERAL: PAINLEVEL: NO PAIN (0)

## 2020-10-13 ASSESSMENT — MIFFLIN-ST. JEOR: SCORE: 1300.19

## 2020-10-13 NOTE — LETTER
October 13, 2020      Diana Ware  [address] [place] [address]        Dear ,    We are writing to inform you of your test results.    Lipids are excellent on crestor, with LDL at goal of <100, continue Crestor   chemistries and blood counts are normal     Resulted Orders   CBC with platelets   Result Value Ref Range    WBC 5.0 4.0 - 11.0 10e9/L    RBC Count 4.88 3.8 - 5.2 10e12/L    Hemoglobin 14.3 11.7 - 15.7 g/dL    Hematocrit 43.8 35.0 - 47.0 %    MCV 90 78 - 100 fl    MCH 29.3 26.5 - 33.0 pg    MCHC 32.6 31.5 - 36.5 g/dL    RDW 13.2 10.0 - 15.0 %    Platelet Count 215 150 - 450 10e9/L   Comprehensive metabolic panel   Result Value Ref Range    Sodium 137 133 - 144 mmol/L    Potassium 4.3 3.4 - 5.3 mmol/L    Chloride 105 94 - 109 mmol/L    Carbon Dioxide 28 20 - 32 mmol/L    Anion Gap 4 3 - 14 mmol/L    Glucose 93 70 - 99 mg/dL      Comment:      Fasting specimen    Urea Nitrogen 18 7 - 30 mg/dL    Creatinine 0.73 0.52 - 1.04 mg/dL    GFR Estimate 85 >60 mL/min/[1.73_m2]      Comment:      Non  GFR Calc  Starting 12/18/2018, serum creatinine based estimated GFR (eGFR) will be   calculated using the Chronic Kidney Disease Epidemiology Collaboration   (CKD-EPI) equation.      GFR Estimate If Black >90 >60 mL/min/[1.73_m2]      Comment:       GFR Calc  Starting 12/18/2018, serum creatinine based estimated GFR (eGFR) will be   calculated using the Chronic Kidney Disease Epidemiology Collaboration   (CKD-EPI) equation.      Calcium 9.3 8.5 - 10.1 mg/dL    Bilirubin Total 0.6 0.2 - 1.3 mg/dL    Albumin 3.8 3.4 - 5.0 g/dL    Protein Total 7.3 6.8 - 8.8 g/dL    Alkaline Phosphatase 65 40 - 150 U/L    ALT 34 0 - 50 U/L    AST 17 0 - 45 U/L   Lipid Profile   Result Value Ref Range    Cholesterol 168 <200 mg/dL    Triglycerides 101 <150 mg/dL      Comment:      Fasting specimen    HDL Cholesterol 52 >49 mg/dL    LDL Cholesterol Calculated 96 <100 mg/dL      Comment:       Desirable:       <100 mg/dl    Non HDL Cholesterol 116 <130 mg/dL       If you have any questions or concerns, please call the clinic at the number listed above.       Sincerely,        Mariela Dolan MD

## 2020-10-13 NOTE — NURSING NOTE
"Chief Complaint   Patient presents with     Physical       Initial /74   Pulse 84   Temp 98.4  F (36.9  C) (Tympanic)   Resp 19   Ht 1.676 m (5' 6\")   Wt 74.8 kg (165 lb)   SpO2 98%   BMI 26.63 kg/m   Estimated body mass index is 26.63 kg/m  as calculated from the following:    Height as of this encounter: 1.676 m (5' 6\").    Weight as of this encounter: 74.8 kg (165 lb).  Medication Reconciliation: complete  Moon Jalloh LPN    "

## 2020-10-14 ASSESSMENT — ANXIETY QUESTIONNAIRES: GAD7 TOTAL SCORE: 0

## 2020-10-19 DIAGNOSIS — E78.5 HYPERLIPIDEMIA LDL GOAL <130: ICD-10-CM

## 2020-10-19 RX ORDER — ROSUVASTATIN CALCIUM 5 MG/1
TABLET, COATED ORAL
Qty: 90 TABLET | Refills: 0 | Status: SHIPPED | OUTPATIENT
Start: 2020-10-19 | End: 2021-01-18

## 2020-10-19 NOTE — TELEPHONE ENCOUNTER
Crestor       Last Written Prescription Date:  7/6/2020  Last Fill Quantity: 90,   # refills: 0  Last Office Visit: 10/13/2020  Future Office visit:

## 2020-11-09 DIAGNOSIS — G25.81 RESTLESS LEGS SYNDROME (RLS): ICD-10-CM

## 2020-11-10 RX ORDER — PRAMIPEXOLE DIHYDROCHLORIDE 0.5 MG/1
TABLET ORAL
Qty: 90 TABLET | Refills: 1 | Status: SHIPPED | OUTPATIENT
Start: 2020-11-10 | End: 2021-01-26

## 2020-11-10 NOTE — TELEPHONE ENCOUNTER
pramipexole      Last Written Prescription Date:  8/19/20  Last Fill Quantity: 90,   # refills: 1  Last Office Visit: 10/13/20  Future Office visit:

## 2021-01-04 ENCOUNTER — TRANSFERRED RECORDS (OUTPATIENT)
Dept: HEALTH INFORMATION MANAGEMENT | Facility: CLINIC | Age: 66
End: 2021-01-04

## 2021-01-13 DIAGNOSIS — M81.0 AGE RELATED OSTEOPOROSIS, UNSPECIFIED PATHOLOGICAL FRACTURE PRESENCE: ICD-10-CM

## 2021-01-13 RX ORDER — ALENDRONATE SODIUM 70 MG/1
TABLET ORAL
Qty: 12 TABLET | Refills: 3 | Status: SHIPPED | OUTPATIENT
Start: 2021-01-13 | End: 2022-02-08

## 2021-01-13 NOTE — TELEPHONE ENCOUNTER
fosomax      Last Written Prescription Date:  9/23/20  Last Fill Quantity: 12,   # refills: 0  Last Office Visit:10/13/20  Future Office visit:       Routing refill request to provider for review/approval because:

## 2021-01-18 DIAGNOSIS — E78.5 HYPERLIPIDEMIA LDL GOAL <130: ICD-10-CM

## 2021-01-18 RX ORDER — ROSUVASTATIN CALCIUM 5 MG/1
TABLET, COATED ORAL
Qty: 90 TABLET | Refills: 1 | Status: SHIPPED | OUTPATIENT
Start: 2021-01-18 | End: 2021-07-19

## 2021-01-26 DIAGNOSIS — G25.81 RESTLESS LEGS SYNDROME (RLS): ICD-10-CM

## 2021-01-26 RX ORDER — PRAMIPEXOLE DIHYDROCHLORIDE 0.5 MG/1
TABLET ORAL
Qty: 90 TABLET | Refills: 1 | Status: SHIPPED | OUTPATIENT
Start: 2021-01-26 | End: 2021-04-18

## 2021-01-26 NOTE — TELEPHONE ENCOUNTER
Mirapex 0.5mg      Last Written Prescription Date:  11/10/20  Last Fill Quantity: 90,   # refills: 1  Last Office Visit: 10/13/20  Future Office visit:       Routing refill request to provider for review/approval because:

## 2021-02-09 ENCOUNTER — TELEPHONE (OUTPATIENT)
Dept: FAMILY MEDICINE | Facility: OTHER | Age: 66
End: 2021-02-09

## 2021-02-09 DIAGNOSIS — G25.81 RESTLESS LEG SYNDROME: Primary | ICD-10-CM

## 2021-02-09 RX ORDER — GABAPENTIN 300 MG/1
300 CAPSULE ORAL
Qty: 30 CAPSULE | Refills: 1 | Status: SHIPPED | OUTPATIENT
Start: 2021-02-09 | End: 2021-03-17

## 2021-03-09 DIAGNOSIS — L67.9 HAIR DISORDER: Primary | ICD-10-CM

## 2021-03-09 RX ORDER — EFLORNITHINE HYDROCHLORIDE 139 MG/G
CREAM TOPICAL 2 TIMES DAILY
Qty: 45 G | Refills: 3 | Status: SHIPPED | OUTPATIENT
Start: 2021-03-09 | End: 2022-08-15

## 2021-03-09 NOTE — TELEPHONE ENCOUNTER
Patient calling and states the gabapentin is helping with her restless legs and would like to continue. Patient stated she is not needing a refill at this time and will call her pharmacy when she is needing one.     Patient is also requesting Vaniqa cream. States she has used this in the past for hair growth and would like a refill. Medication pended. Please advise, thank you.

## 2021-03-15 ENCOUNTER — TELEPHONE (OUTPATIENT)
Dept: FAMILY MEDICINE | Facility: OTHER | Age: 66
End: 2021-03-15

## 2021-03-15 NOTE — TELEPHONE ENCOUNTER
Received a PA from [employer] for Vaniqa 13.9% cream. Submitted on CMM. Waiting for a response.

## 2021-03-16 NOTE — TELEPHONE ENCOUNTER
Received a DENIAL from [employer] for Vaniqa 13.9% cream.    Forms scanned to Epic.

## 2021-07-14 DIAGNOSIS — G25.81 RESTLESS LEGS SYNDROME (RLS): ICD-10-CM

## 2021-07-15 RX ORDER — PRAMIPEXOLE DIHYDROCHLORIDE 0.5 MG/1
TABLET ORAL
Qty: 90 TABLET | Refills: 1 | Status: SHIPPED | OUTPATIENT
Start: 2021-07-15 | End: 2021-10-08

## 2021-07-15 NOTE — TELEPHONE ENCOUNTER
Patient called for script, she is leaving town tomorrow.    Failed protocol, due for 6 month visit.    pramipexole (MIRAPEX) 0.5 MG tablet      Last Written Prescription Date:  4/18/21  Last Fill Quantity: 90,   # refills: 1  Last Office Visit: 10/13/20  Future Office visit:       Routing refill request to provider for review/approval because:  Drug not on the [facility], [facility] or [facility] refill protocol or controlled substance

## 2021-07-15 NOTE — TELEPHONE ENCOUNTER
mirapex      Last Written Prescription Date:  4/18/21  Last Fill Quantity: 90,   # refills: 1  Last Office Visit: 10/13/2020  Future Office visit:

## 2021-07-17 ENCOUNTER — TRANSFERRED RECORDS (OUTPATIENT)
Dept: HEALTH INFORMATION MANAGEMENT | Facility: CLINIC | Age: 66
End: 2021-07-17

## 2021-07-19 DIAGNOSIS — E78.5 HYPERLIPIDEMIA LDL GOAL <130: ICD-10-CM

## 2021-07-19 RX ORDER — ROSUVASTATIN CALCIUM 5 MG/1
TABLET, COATED ORAL
Qty: 90 TABLET | Refills: 0 | Status: SHIPPED | OUTPATIENT
Start: 2021-07-19 | End: 2021-10-28

## 2021-07-25 DIAGNOSIS — E78.5 HYPERLIPIDEMIA LDL GOAL <130: ICD-10-CM

## 2021-07-27 RX ORDER — ROSUVASTATIN CALCIUM 5 MG/1
TABLET, COATED ORAL
Qty: 90 TABLET | Refills: 0 | OUTPATIENT
Start: 2021-07-27

## 2021-09-20 ENCOUNTER — TELEPHONE (OUTPATIENT)
Dept: FAMILY MEDICINE | Facility: OTHER | Age: 66
End: 2021-09-20

## 2021-09-20 NOTE — TELEPHONE ENCOUNTER
Card received from pt stating she has not currently been infected with Covid nor has she been vaccinated for Muslim reasons however she has heard of treatment options in the event she becomes infected with covid. She is requesting a prescription for either Ivermectin or Hydroxychlorquine to have on hand if she does become ill with Covid. Please advise if able to prescribe these medications for patient.

## 2021-09-20 NOTE — TELEPHONE ENCOUNTER
We cannot prescribe these medications as they are not approved for treatment for Covid. Studies have shown that hydroxychloroquine dosen't work to treat Covid. There is a study at the [facility] looking at Ivermectin that she could be involved in if she gets ill. At this time there is nothing to treat covid unless she gets severely ill

## 2021-09-22 DIAGNOSIS — I10 BENIGN ESSENTIAL HYPERTENSION: ICD-10-CM

## 2021-09-23 ENCOUNTER — TELEPHONE (OUTPATIENT)
Dept: MAMMOGRAPHY | Facility: OTHER | Age: 66
End: 2021-09-23

## 2021-09-23 ENCOUNTER — ANCILLARY PROCEDURE (OUTPATIENT)
Dept: MAMMOGRAPHY | Facility: OTHER | Age: 66
End: 2021-09-23
Attending: FAMILY MEDICINE
Payer: MEDICARE

## 2021-09-23 DIAGNOSIS — Z12.31 VISIT FOR SCREENING MAMMOGRAM: ICD-10-CM

## 2021-09-23 PROCEDURE — 77063 BREAST TOMOSYNTHESIS BI: CPT | Mod: TC

## 2021-09-23 RX ORDER — LISINOPRIL 10 MG/1
10 TABLET ORAL DAILY
Qty: 90 TABLET | Refills: 0 | Status: SHIPPED | OUTPATIENT
Start: 2021-09-23 | End: 2021-12-20

## 2021-09-23 NOTE — TELEPHONE ENCOUNTER
Lisinopril       Last Written Prescription Date:  3/17/2021  Last Fill Quantity: 90,   # refills: 1  Last Office Visit: 10/13/2020  Future Office visit:    Next 5 appointments (look out 90 days)    Oct 14, 2021  9:15 AM  (Arrive by 9:00 AM)  PHYSICAL with Mariela Dolan MD  [facility] [place] ([facility] [place] ) [address]  [place] [address]  [phone]

## 2021-09-24 NOTE — PATIENT INSTRUCTIONS
Preventive Health Recommendations    See your health care provider every year to    Review health changes.     Discuss preventive care.      Review your medicines if your doctor has prescribed any.      You no longer need a yearly Pap test unless you've had an abnormal Pap test in the past 10 years. If you have vaginal symptoms, such as bleeding or discharge, be sure to talk with your provider about a Pap test.      Every 1 to 2 years, have a mammogram.  If you are over 69, talk with your health care provider about whether or not you want to continue having screening mammograms.      Every 10 years, have a colonoscopy. Or, have a yearly FIT test (stool test). These exams will check for colon cancer.       Have a cholesterol test every 5 years, or more often if your doctor advises it.       Have a diabetes test (fasting glucose) every three years. If you are at risk for diabetes, you should have this test more often.       At age 65, have a bone density scan (DEXA) to check for osteoporosis (brittle bone disease).    Shots:    Get a flu shot each year.    Get a tetanus shot every 10 years.    Talk to your doctor about your pneumonia vaccines. There are now two you should receive - Pneumovax (PPSV 23) and Prevnar (PCV 13).    Talk to your pharmacist about the shingles vaccine.    Talk to your doctor about the hepatitis B vaccine.    Nutrition:     Eat at least 5 servings of fruits and vegetables each day.      Eat whole-grain bread, whole-wheat pasta and brown rice instead of white grains and rice.      Get adequate about Calcium and Vitamin D.     Lifestyle    Exercise at least 150 minutes a week (30 minutes a day, 5 days a week). This will help you control your weight and prevent disease.      Limit alcohol to one drink per day.      No smoking.       Wear sunscreen to prevent skin cancer.       See your dentist twice a year for an exam and cleaning.      See your eye doctor every 1 to 2 years to screen for  conditions such as glaucoma, macular degeneration, cataracts, etc.    Personalized Prevention Plan  You are due for the preventive services outlined below.  Your care team is available to assist you in scheduling these services.  If you have already completed any of these items, please share that information with your care team to update in your medical record.    Health Maintenance Due   Topic Date Due     COVID-19 Vaccine (1) Never done     PHQ-2  01/01/2021     Flu Vaccine (1) 09/01/2021     Osteoporosis Screening  09/09/2021     Annual Wellness Visit  10/13/2021     FALL RISK ASSESSMENT  10/13/2021

## 2021-09-24 NOTE — PROGRESS NOTES
"SUBJECTIVE:   Diana Ware is a 66 year old female who presents for Preventive Visit.      Are you in the first 12 months of your Medicare coverage?  No    Healthy Habits:    In general, how would you rate your overall health?  Very good    Frequency of exercise:  4-5 days/week    Duration of exercise:  30-45 minutes    Do you usually eat at least 4 servings of fruit and vegetables a day, include whole grains    & fiber and avoid regularly eating high fat or \"junk\" foods?  Yes    Taking medications regularly:  Yes    Barriers to taking medications:  None    Medication side effects:  Lightheadedness    Ability to successfully perform activities of daily living:  No assistance needed    Home Safety:  No safety concerns identified    Hearing Impairment:  No hearing concerns    In the past 6 months, have you been bothered by leaking of urine?  No    In general, how would you rate your overall mental or emotional health?  Very good      PHQ-2 Total Score:    Do you feel safe in your environment? Yes    Have you ever done Advance Care Planning? (For example, a Health Directive, POLST, or a discussion with a medical provider or your loved ones about your wishes): No, advance care planning information given to patient to review.  Patient declined advance care planning discussion at this time.       Fall risk  Fallen 2 or more times in the past year?: No  Any fall with injury in the past year?: No    Cognitive Screening   1) Repeat 3 items (Leader, Season, Table)    2) Clock draw: NORMAL  3) 3 item recall: Recalls 3 objects  Results: 3 items recalled: COGNITIVE IMPAIRMENT LESS LIKELY    Mini-CogTM Copyright KIMBERLY Christensen. Licensed by the author for use in [facility]; reprinted with permission (angus@.[facility]). All rights reserved.      Do you have sleep apnea, excessive snoring or daytime drowsiness?: yes     This is related to Restless leg syndrome which has been an ongoing issue and wakes her up at night. She has " been evaluated at [facility] and continues on gabapentin and Mirapex for this    Aspirin  She is taking 81 mg daily and would like to continue despite the recent recommendation that came out to stop aspirin unless there is a diagnosis of heart disease or diabetes. Discussed, continue    Incontinence  Mild, occasional leakage which would require a pad    Reviewed and updated as needed this visit by clinical staff  Tobacco  Allergies  Meds   Med Hx  Surg Hx  Fam Hx  Soc Hx        Reviewed and updated as needed this visit by Provider                Social History     Tobacco Use     Smoking status: Never Smoker     Smokeless tobacco: Never Used   Substance Use Topics     Alcohol use: Yes     Comment: Socially     If you drink alcohol do you typically have >3 drinks per day or >7 drinks per week? No    No flowsheet data found.            Current providers sharing in care for this patient include:   Patient Care Team:  Mariela Dolan MD as PCP - General  Mariela Dolan MD as Assigned PCP    The following health maintenance items are reviewed in Epic and correct as of today:  Health Maintenance Due   Topic Date Due     COVID-19 Vaccine (1) Never done     PHQ-2  01/01/2021     INFLUENZA VACCINE (1) 09/01/2021     DEXA  09/09/2021     BP Readings from Last 3 Encounters:   10/14/21 122/70   10/13/20 126/74   09/03/19 124/72    Wt Readings from Last 3 Encounters:   10/14/21 74.8 kg (165 lb)   10/13/20 74.8 kg (165 lb)   09/03/19 80.7 kg (178 lb)                  Patient Active Problem List   Diagnosis     Advanced care planning/counseling discussion     Osteoporosis     Dysplastic Pap smear     Hyperlipidemia with target LDL less than 130     Migraine     Arthritis of knee, left     Restless leg syndrome     Fracture of foot bone without toes, closed     Injury of right lateral plantar nerve     Plantar fasciitis     Corneal erosion     Decreased mobility     Left leg weakness     Benign essential hypertension     History  of total left knee replacement     AK (actinic keratosis)     ACE-inhibitor cough     Iron deficiency     Past Surgical History:   Procedure Laterality Date     APPENDECTOMY  1967     ARTHROPLASTY KNEE Left 01/23/2019    Dr Edwards     ARTHROSCOPY KNEE  2000    Left knee; medial meniscal tear     ARTHROSCOPY KNEE  2010    RT     COLONOSCOPY  2004    benign polyp, repeat 2014     COLONOSCOPY N/A 10/17/2014    Procedure: COLONOSCOPY;  Surgeon: Renzo Kearney MD;  Location: [facility]     FRACTURE TX, WRIST RT/LT Left 01/2017    plate and screws placed     LAPAROSCOPY DIAGNOSTIC (GENERAL)  1992    adhesions     ORTHOPEDIC SURGERY  2017    ORIF of left distal radius using DVR Biomet Plate     SALPINGECTOMY  1967    left salpingectomy, left oophorectomy; Teratoma     STRESS TEST  2010    Chest pain; negative, in [place]     SURGICAL PATHOLOGY EXAM  05/23/2013    [facility] Biopsy, Galileo Franz Mayo for 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, no dysplasia noted on [facility] biopsy     ZZ STRESS THALLIUM TEST  2003    Chest pain; negative       Social History     Tobacco Use     Smoking status: Never Smoker     Smokeless tobacco: Never Used   Substance Use Topics     Alcohol use: Yes     Comment: Socially     Family History   Problem Relation Age of Onset     Heart Disease Mother      Heart Disease Father      Cerebrovascular Disease Father      Heart Disease Maternal Grandmother      Diabetes Paternal Grandmother      Breast Cancer Maternal Aunt          Current Outpatient Medications   Medication Sig Dispense Refill     alendronate (FOSAMAX) 70 MG tablet TAKE 1 TABLET BY MOUTH ONCE WEEKLY ON AN EMPTY STOMACH 12 tablet 3     aspirin 81 MG EC tablet Take 81 mg by mouth daily       Calcium Carbonate-Vit D-Min (CALCIUM 1200 PO)        eflornithine HCl (VANIQA) 13.9 % CREA Externally apply topically 2 times daily 45 g 3     ferrous fumarate 65 mg, [place]. FE,-Vitamin C 125 mg (VITRON-C)  MG TABS tablet Take 1 tablet by mouth 3 times daily    "    gabapentin (NEURONTIN) 300 MG capsule TAKE 1 CAPSULE (300 MG) BY MOUTH AT BEDTIME AS NEEDED, MAY REPEAT ONCE (RESTLESS LEGS) 90 capsule 3     IBUPROFEN PO Take 400 mg by mouth every 6 hours as needed for moderate pain       lisinopril (ZESTRIL) 10 MG tablet TAKE 1 TABLET (10 MG) BY MOUTH DAILY 90 tablet 0     Multiple Vitamins-Minerals (WOMENS MULTIVITAMIN PLUS PO) Take by mouth daily       order for DME Equipment being ordered: Custom oral appliance for AVIVA 1 Units 0     pramipexole (MIRAPEX) 0.5 MG tablet TAKE 1 OR 2 TABLETS BY MOUTH 2-3 HOURS BEFORE BEDTIME 4 tablet 0     rosuvastatin (CRESTOR) 5 MG tablet TAKE 1 TABLET BY MOUTH DAILY 90 tablet 0     Allergies   Allergen Reactions     Simvastatin Other (See Comments)     Myalgias and elevated CPK     Mammogram Screening: Mammogram Screening: Recommended mammography every 1-2 years with patient discussion and risk factor consideration  Last 3 Pap and HPV Results:   PAP / HPV Latest Ref Rng & Units 8/8/2017 8/2/2016 8/17/2015   PAP (Historical) - NIL NIL NIL   HPV16 NEG:Negative Negative - -   HPV18 NEG:Negative Negative - -   HRHPV NEG:Negative Negative - -     Any new diagnosis of family breast, ovarian, or bowel cancer? No    FHS-7:   Breast CA Risk Assessment (FHS-7) 9/23/2021   Did any of your first-degree relatives have breast or ovarian cancer? Yes       Mammogram Screening: Recommended mammography every 1-2 years with patient discussion and risk factor consideration  Pertinent mammograms are reviewed under the imaging tab.  Mammogram done, negative     Review of Systems  Constitutional, HEENT, cardiovascular, pulmonary, GI, , musculoskeletal, neuro, skin, endocrine and psych systems are negative, except as otherwise noted.    OBJECTIVE:   /70   Pulse 71   Temp 97.6  F (36.4  C) (Tympanic)   Resp 19   Ht 1.676 m (5' 6\")   Wt 74.8 kg (165 lb)   SpO2 96%   BMI 26.63 kg/m   Estimated body mass index is 26.63 kg/m  as calculated from the " "following:    Height as of this encounter: 1.676 m (5' 6\").    Weight as of this encounter: 74.8 kg (165 lb).  Physical Exam  GENERAL APPEARANCE: healthy, alert and no distress  EYES: Eyes grossly normal to inspection, PERRL and conjunctivae and sclerae normal  HENT: ear canals and TM's normal, nose and mouth without ulcers or lesions, oropharynx clear and oral mucous membranes moist  NECK: no adenopathy, no asymmetry, masses, or scars and thyroid normal to palpation  RESP: lungs clear to auscultation - no rales, rhonchi or wheezes  BREAST: normal without masses, tenderness or nipple discharge and no palpable axillary masses or adenopathy  CV: regular rate and rhythm, normal S1 S2, no S3 or S4, no murmur, click or rub, no peripheral edema and peripheral pulses strong  ABDOMEN: soft, nontender, no hepatosplenomegaly, no masses and bowel sounds normal  MS: no musculoskeletal defects are noted and gait is age appropriate without ataxia  SKIN: no suspicious lesions or rashes  NEURO: Normal strength and tone, sensory exam grossly normal, mentation intact and speech normal  PSYCH: mentation appears normal and affect normal/bright        ASSESSMENT / PLAN:   (Z00.00) Routine general medical examination at a health care facility  (primary encounter diagnosis)  Comment:   Plan: doing well  Mammogram done    (I10) Benign essential hypertension  Comment:   Plan: CBC with platelets, Comprehensive metabolic         panel        Well controlled, check lab today    (E78.5) Hyperlipidemia with target LDL less than 130  Comment:   Plan: Lipid Profile (Chol, Trig, HDL, LDL calc)        Due for lab today    (G25.81) Restless leg syndrome  Comment:   Plan: ongoing, she continues with gabapentin, Mirapex, followed at [facility]    (E28.39) Estrogen deficiency  Comment:   Plan: DX Hip/Pelvis/Spine        Due for scan    (N39.3) Female stress incontinence  Comment:   Plan: discussed, will follow at this time, not ready for " "medications      COUNSELING:  Reviewed preventive health counseling, as reflected in patient instructions       Regular exercise       Healthy diet/nutrition       Immunizations    Vaccinated for: Influenza             Aspirin prophylaxis     Estimated body mass index is 26.63 kg/m  as calculated from the following:    Height as of this encounter: 1.676 m (5' 6\").    Weight as of this encounter: 74.8 kg (165 lb).        She reports that she has never smoked. She has never used smokeless tobacco.      Appropriate preventive services were discussed with this patient, including applicable screening as appropriate for cardiovascular disease, diabetes, osteopenia/osteoporosis, and glaucoma.  As appropriate for age/gender, discussed screening for colorectal cancer, prostate cancer, breast cancer, and cervical cancer. Checklist reviewing preventive services available has been given to the patient.    Reviewed patients plan of care and provided an AVS. The Basic Care Plan (routine screening as documented in Health Maintenance) for Diana meets the Care Plan requirement. This Care Plan has been established and reviewed with the Patient.    Counseling Resources:  ATP IV Guidelines  Pooled Cohorts Equation Calculator  Breast Cancer Risk Calculator  Breast Cancer: Medication to Reduce Risk  FRAX Risk Assessment  ICSI Preventive Guidelines  Dietary Guidelines for Americans, 2010  [employer]'s MyPlate  ASA Prophylaxis  Lung CA Screening    Mariela Dolan MD  [facility] [place]    Identified Health Risks:  "

## 2021-10-08 DIAGNOSIS — G25.81 RESTLESS LEGS SYNDROME (RLS): ICD-10-CM

## 2021-10-08 RX ORDER — PRAMIPEXOLE DIHYDROCHLORIDE 0.5 MG/1
TABLET ORAL
Qty: 90 TABLET | Refills: 0 | Status: SHIPPED | OUTPATIENT
Start: 2021-10-08 | End: 2021-10-14

## 2021-10-14 ENCOUNTER — OFFICE VISIT (OUTPATIENT)
Dept: FAMILY MEDICINE | Facility: OTHER | Age: 66
End: 2021-10-14
Attending: FAMILY MEDICINE
Payer: COMMERCIAL

## 2021-10-14 VITALS
OXYGEN SATURATION: 96 % | HEART RATE: 71 BPM | DIASTOLIC BLOOD PRESSURE: 70 MMHG | HEIGHT: 66 IN | RESPIRATION RATE: 19 BRPM | BODY MASS INDEX: 26.52 KG/M2 | TEMPERATURE: 97.6 F | SYSTOLIC BLOOD PRESSURE: 122 MMHG | WEIGHT: 165 LBS

## 2021-10-14 DIAGNOSIS — I10 BENIGN ESSENTIAL HYPERTENSION: ICD-10-CM

## 2021-10-14 DIAGNOSIS — E78.5 HYPERLIPIDEMIA WITH TARGET LDL LESS THAN 130: ICD-10-CM

## 2021-10-14 DIAGNOSIS — E28.39 ESTROGEN DEFICIENCY: ICD-10-CM

## 2021-10-14 DIAGNOSIS — Z23 NEED FOR PROPHYLACTIC VACCINATION AND INOCULATION AGAINST INFLUENZA: ICD-10-CM

## 2021-10-14 DIAGNOSIS — G25.81 RESTLESS LEG SYNDROME: ICD-10-CM

## 2021-10-14 DIAGNOSIS — N39.3 FEMALE STRESS INCONTINENCE: ICD-10-CM

## 2021-10-14 DIAGNOSIS — Z00.00 ROUTINE GENERAL MEDICAL EXAMINATION AT A HEALTH CARE FACILITY: Primary | ICD-10-CM

## 2021-10-14 LAB
ALBUMIN SERPL-MCNC: 3.8 G/DL (ref 3.4–5)
ALP SERPL-CCNC: 61 U/L (ref 40–150)
ALT SERPL W P-5'-P-CCNC: 50 U/L (ref 0–50)
ANION GAP SERPL CALCULATED.3IONS-SCNC: 4 MMOL/L (ref 3–14)
AST SERPL W P-5'-P-CCNC: 21 U/L (ref 0–45)
BILIRUB SERPL-MCNC: 0.5 MG/DL (ref 0.2–1.3)
BUN SERPL-MCNC: 27 MG/DL (ref 7–30)
CALCIUM SERPL-MCNC: 9.1 MG/DL (ref 8.5–10.1)
CHLORIDE BLD-SCNC: 111 MMOL/L (ref 94–109)
CHOLEST SERPL-MCNC: 166 MG/DL
CO2 SERPL-SCNC: 25 MMOL/L (ref 20–32)
CREAT SERPL-MCNC: 0.86 MG/DL (ref 0.52–1.04)
ERYTHROCYTE [DISTWIDTH] IN BLOOD BY AUTOMATED COUNT: 13.7 % (ref 10–15)
FASTING STATUS PATIENT QL REPORTED: YES
GFR SERPL CREATININE-BSD FRML MDRD: 70 ML/MIN/1.73M2
GLUCOSE BLD-MCNC: 84 MG/DL (ref 70–99)
HCT VFR BLD AUTO: 43.3 % (ref 35–47)
HDLC SERPL-MCNC: 52 MG/DL
HGB BLD-MCNC: 14.3 G/DL (ref 11.7–15.7)
LDLC SERPL CALC-MCNC: 87 MG/DL
MCH RBC QN AUTO: 29.4 PG (ref 26.5–33)
MCHC RBC AUTO-ENTMCNC: 33 G/DL (ref 31.5–36.5)
MCV RBC AUTO: 89 FL (ref 78–100)
NONHDLC SERPL-MCNC: 114 MG/DL
PLATELET # BLD AUTO: 205 10E3/UL (ref 150–450)
POTASSIUM BLD-SCNC: 4.4 MMOL/L (ref 3.4–5.3)
PROT SERPL-MCNC: 7.4 G/DL (ref 6.8–8.8)
RBC # BLD AUTO: 4.87 10E6/UL (ref 3.8–5.2)
SODIUM SERPL-SCNC: 140 MMOL/L (ref 133–144)
TRIGL SERPL-MCNC: 136 MG/DL
WBC # BLD AUTO: 4.6 10E3/UL (ref 4–11)

## 2021-10-14 PROCEDURE — 80061 LIPID PANEL: CPT | Mod: ZL | Performed by: FAMILY MEDICINE

## 2021-10-14 PROCEDURE — G0463 HOSPITAL OUTPT CLINIC VISIT: HCPCS | Mod: 25

## 2021-10-14 PROCEDURE — G0008 ADMIN INFLUENZA VIRUS VAC: HCPCS

## 2021-10-14 PROCEDURE — 85027 COMPLETE CBC AUTOMATED: CPT | Mod: ZL | Performed by: FAMILY MEDICINE

## 2021-10-14 PROCEDURE — 90662 IIV NO PRSV INCREASED AG IM: CPT | Performed by: FAMILY MEDICINE

## 2021-10-14 PROCEDURE — G0439 PPPS, SUBSEQ VISIT: HCPCS | Performed by: FAMILY MEDICINE

## 2021-10-14 PROCEDURE — 90662 IIV NO PRSV INCREASED AG IM: CPT

## 2021-10-14 PROCEDURE — 36415 COLL VENOUS BLD VENIPUNCTURE: CPT | Mod: ZL | Performed by: FAMILY MEDICINE

## 2021-10-14 PROCEDURE — 82040 ASSAY OF SERUM ALBUMIN: CPT | Mod: ZL | Performed by: FAMILY MEDICINE

## 2021-10-14 PROCEDURE — 82247 BILIRUBIN TOTAL: CPT | Mod: ZL | Performed by: FAMILY MEDICINE

## 2021-10-14 PROCEDURE — G0008 ADMIN INFLUENZA VIRUS VAC: HCPCS | Performed by: FAMILY MEDICINE

## 2021-10-14 ASSESSMENT — ANXIETY QUESTIONNAIRES
3. WORRYING TOO MUCH ABOUT DIFFERENT THINGS: NOT AT ALL
GAD7 TOTAL SCORE: 2
5. BEING SO RESTLESS THAT IT IS HARD TO SIT STILL: SEVERAL DAYS
2. NOT BEING ABLE TO STOP OR CONTROL WORRYING: NOT AT ALL
1. FEELING NERVOUS, ANXIOUS, OR ON EDGE: NOT AT ALL
7. FEELING AFRAID AS IF SOMETHING AWFUL MIGHT HAPPEN: NOT AT ALL
6. BECOMING EASILY ANNOYED OR IRRITABLE: SEVERAL DAYS
IF YOU CHECKED OFF ANY PROBLEMS ON THIS QUESTIONNAIRE, HOW DIFFICULT HAVE THESE PROBLEMS MADE IT FOR YOU TO DO YOUR WORK, TAKE CARE OF THINGS AT HOME, OR GET ALONG WITH OTHER PEOPLE: NOT DIFFICULT AT ALL

## 2021-10-14 ASSESSMENT — PATIENT HEALTH QUESTIONNAIRE - PHQ9
5. POOR APPETITE OR OVEREATING: NOT AT ALL
SUM OF ALL RESPONSES TO PHQ QUESTIONS 1-9: 3

## 2021-10-14 ASSESSMENT — MIFFLIN-ST. JEOR: SCORE: 1295.19

## 2021-10-14 ASSESSMENT — PAIN SCALES - GENERAL: PAINLEVEL: NO PAIN (0)

## 2021-10-14 ASSESSMENT — ACTIVITIES OF DAILY LIVING (ADL): CURRENT_FUNCTION: NO ASSISTANCE NEEDED

## 2021-10-14 NOTE — NURSING NOTE
"Chief Complaint   Patient presents with     Physical       Initial /70   Pulse 71   Temp 97.6  F (36.4  C) (Tympanic)   Resp 19   Ht 1.676 m (5' 6\")   Wt 74.8 kg (165 lb)   SpO2 96%   BMI 26.63 kg/m   Estimated body mass index is 26.63 kg/m  as calculated from the following:    Height as of this encounter: 1.676 m (5' 6\").    Weight as of this encounter: 74.8 kg (165 lb).  Medication Reconciliation: complete  Moon Jalloh LPN    "

## 2021-10-14 NOTE — LETTER
October 14, 2021      Diana Ware  [address] [place] [address]        Dear ,    We are writing to inform you of your test results.    Lipids are excellent on crestor with LDL improved at 87,HDL at goal of >50   Chemistries and blood counts are normal   Borderline chloride is not significant       Resulted Orders   CBC with platelets   Result Value Ref Range    WBC Count 4.6 4.0 - 11.0 10e3/uL    RBC Count 4.87 3.80 - 5.20 10e6/uL    Hemoglobin 14.3 11.7 - 15.7 g/dL    Hematocrit 43.3 35.0 - 47.0 %    MCV 89 78 - 100 fL    MCH 29.4 26.5 - 33.0 pg    MCHC 33.0 31.5 - 36.5 g/dL    RDW 13.7 10.0 - 15.0 %    Platelet Count 205 150 - 450 10e3/uL   Comprehensive metabolic panel   Result Value Ref Range    Sodium 140 133 - 144 mmol/L    Potassium 4.4 3.4 - 5.3 mmol/L    Chloride 111 (H) 94 - 109 mmol/L    Carbon Dioxide (CO2) 25 20 - 32 mmol/L    Anion Gap 4 3 - 14 mmol/L    Urea Nitrogen 27 7 - 30 mg/dL    Creatinine 0.86 0.52 - 1.04 mg/dL    Calcium 9.1 8.5 - 10.1 mg/dL    Glucose 84 70 - 99 mg/dL    Alkaline Phosphatase 61 40 - 150 U/L    AST 21 0 - 45 U/L    ALT 50 0 - 50 U/L    Protein Total 7.4 6.8 - 8.8 g/dL    Albumin 3.8 3.4 - 5.0 g/dL    Bilirubin Total 0.5 0.2 - 1.3 mg/dL    GFR Estimate 70 >60 mL/min/1.73m2      Comment:      As of July 11, 2021, eGFR is calculated by the CKD-EPI creatinine equation, without race adjustment. eGFR can be influenced by muscle mass, exercise, and diet. The reported eGFR is an estimation only and is only applicable if the renal function is stable.   Lipid Profile (Chol, Trig, HDL, LDL calc)   Result Value Ref Range    Cholesterol 166 <200 mg/dL    Triglycerides 136 <150 mg/dL    Direct Measure HDL 52 >=50 mg/dL    LDL Cholesterol Calculated 87 <=100 mg/dL    Non HDL Cholesterol 114 <130 mg/dL    Patient Fasting > 8hrs? Yes     Narrative    Cholesterol  Desirable:  <200 mg/dL    Triglycerides  Normal:  Less than 150 mg/dL  Borderline High:  150-199 mg/dL  High:   200-499 mg/dL  Very High:  Greater than or equal to 500 mg/dL    Direct Measure HDL  Female:  Greater than or equal to 50 mg/dL   Male:  Greater than or equal to 40 mg/dL    LDL Cholesterol  Desirable:  <100mg/dL  Above Desirable:  100-129 mg/dL   Borderline High:  130-159 mg/dL   High:  160-189 mg/dL   Very High:  >= 190 mg/dL    Non HDL Cholesterol  Desirable:  130 mg/dL  Above Desirable:  130-159 mg/dL  Borderline High:  160-189 mg/dL  High:  190-219 mg/dL  Very High:  Greater than or equal to 220 mg/dL       If you have any questions or concerns, please call the clinic at the number listed above.       Sincerely,      Mariela Dolan MD

## 2021-10-15 ASSESSMENT — ANXIETY QUESTIONNAIRES: GAD7 TOTAL SCORE: 2

## 2021-10-25 ENCOUNTER — HOSPITAL ENCOUNTER (OUTPATIENT)
Dept: BONE DENSITY | Facility: HOSPITAL | Age: 66
Discharge: HOME | End: 2021-10-25
Attending: FAMILY MEDICINE | Admitting: FAMILY MEDICINE
Payer: MEDICARE

## 2021-10-25 DIAGNOSIS — E28.39 ESTROGEN DEFICIENCY: ICD-10-CM

## 2021-10-25 PROCEDURE — 77080 DXA BONE DENSITY AXIAL: CPT

## 2021-10-28 DIAGNOSIS — E78.5 HYPERLIPIDEMIA LDL GOAL <130: ICD-10-CM

## 2021-10-28 RX ORDER — ROSUVASTATIN CALCIUM 5 MG/1
5 TABLET, COATED ORAL DAILY
Qty: 90 TABLET | Refills: 0 | Status: SHIPPED | OUTPATIENT
Start: 2021-10-28 | End: 2022-01-24

## 2021-10-28 NOTE — TELEPHONE ENCOUNTER
rosuvastatin (CRESTOR) 5 MG tablet  Last Written Prescription Date:  7/19/21  Last Fill Quantity: 90,  # refills: 0   Last office visit: 10/14/2021   Future Office Visit:

## 2021-11-19 ENCOUNTER — TELEPHONE (OUTPATIENT)
Dept: FAMILY MEDICINE | Facility: OTHER | Age: 66
End: 2021-11-19
Payer: COMMERCIAL

## 2021-11-19 DIAGNOSIS — G25.81 RESTLESS LEGS SYNDROME (RLS): ICD-10-CM

## 2021-11-19 NOTE — TELEPHONE ENCOUNTER
Since she lives over in the [facility]. Anyone at that clinic would be a good option for her

## 2021-11-19 NOTE — TELEPHONE ENCOUNTER
Pt called and would like MD recommendation on who she should establish care with and who could best help with restless legs?    Please advise.      Call back [phone]     Felicity Coley RN

## 2021-11-22 RX ORDER — PRAMIPEXOLE DIHYDROCHLORIDE 0.5 MG/1
TABLET ORAL
Qty: 90 TABLET | Refills: 0 | Status: SHIPPED | OUTPATIENT
Start: 2021-11-22 | End: 2022-01-03

## 2021-11-22 NOTE — TELEPHONE ENCOUNTER
Call returned from patient, provided with update below.     Patient may establish with Dr. An in [facility].     Patient verbalized understanding.

## 2021-11-29 DIAGNOSIS — G25.81 RESTLESS LEG SYNDROME: ICD-10-CM

## 2021-12-01 RX ORDER — GABAPENTIN 300 MG/1
300 CAPSULE ORAL
Qty: 90 CAPSULE | Refills: 0 | Status: SHIPPED | OUTPATIENT
Start: 2021-12-01 | End: 2022-02-14

## 2021-12-01 NOTE — TELEPHONE ENCOUNTER
GABAPENTIN 300MG CAPSULE       Last Written Prescription Date:  3/17/21  Last Fill Quantity: 90,   # refills: 3  Last Office Visit: 10/14/21  Future Office visit:       Routing refill request to provider for review/approval because:    Drug not on the [facility], P or [facility] refill protocol or controlled substance    PCP out of office     Patient in need of refill today.

## 2022-01-24 DIAGNOSIS — E78.5 HYPERLIPIDEMIA LDL GOAL <130: ICD-10-CM

## 2022-01-24 RX ORDER — ROSUVASTATIN CALCIUM 5 MG/1
5 TABLET, COATED ORAL DAILY
Qty: 90 TABLET | Refills: 0 | Status: SHIPPED | OUTPATIENT
Start: 2022-01-24 | End: 2022-03-22

## 2022-01-24 NOTE — TELEPHONE ENCOUNTER
Crestor      Last Written Prescription Date:  10/28/21  Last Fill Quantity: 90,   # refills: 0  Last Office Visit: 10/14/21  Future Office visit:

## 2022-01-26 ENCOUNTER — APPOINTMENT (OUTPATIENT)
Dept: URGENT CARE | Facility: CLINIC | Age: 67
End: 2022-01-26

## 2022-02-06 DIAGNOSIS — M81.0 AGE RELATED OSTEOPOROSIS, UNSPECIFIED PATHOLOGICAL FRACTURE PRESENCE: ICD-10-CM

## 2022-02-08 RX ORDER — ALENDRONATE SODIUM 70 MG/1
TABLET ORAL
Qty: 12 TABLET | Refills: 1 | Status: SHIPPED | OUTPATIENT
Start: 2022-02-08 | End: 2022-08-30

## 2022-02-12 DIAGNOSIS — G25.81 RESTLESS LEG SYNDROME: ICD-10-CM

## 2022-02-14 RX ORDER — GABAPENTIN 300 MG/1
300 CAPSULE ORAL
Qty: 90 CAPSULE | Refills: 0 | Status: SHIPPED | OUTPATIENT
Start: 2022-02-14 | End: 2022-04-29

## 2022-02-14 NOTE — TELEPHONE ENCOUNTER
gabapentin      Last Written Prescription Date:  12/1/21  Last Fill Quantity: 90,   # refills: 0  Last Office Visit: 10/14/21  Future Office visit:

## 2022-02-28 DIAGNOSIS — G25.81 RESTLESS LEGS SYNDROME (RLS): ICD-10-CM

## 2022-03-02 RX ORDER — PRAMIPEXOLE DIHYDROCHLORIDE 0.5 MG/1
TABLET ORAL
Qty: 90 TABLET | Refills: 0 | Status: SHIPPED | OUTPATIENT
Start: 2022-03-02 | End: 2022-04-07

## 2022-03-02 NOTE — TELEPHONE ENCOUNTER
mirapex      Last Written Prescription Date:  1/3/22  Last Fill Quantity: 90,   # refills: 0  Last Office Visit: 10/14/21  Future Office visit:   Has establish care visit scheduled with you

## 2022-03-15 ENCOUNTER — TELEPHONE (OUTPATIENT)
Dept: FAMILY MEDICINE | Facility: OTHER | Age: 67
End: 2022-03-15

## 2022-03-15 NOTE — TELEPHONE ENCOUNTER
Pt needs est care appt rescheduled due to provider being unavailable.  Can visit be scheduled into a spot around the same time as this visit as a 2nd establish care on the same day or do they have to wait for first available in November?

## 2022-03-15 NOTE — TELEPHONE ENCOUNTER
Keep 10/2022 establish care visit.  Looks like fasting labs are due at that time.  She is up to date on health maintenance except for COVID and tetanus vaccines.  What is the reason for needing to be seen sooner?

## 2022-03-21 DIAGNOSIS — E78.5 HYPERLIPIDEMIA LDL GOAL <130: ICD-10-CM

## 2022-03-22 RX ORDER — ROSUVASTATIN CALCIUM 5 MG/1
TABLET, COATED ORAL
Qty: 90 TABLET | Refills: 0 | Status: SHIPPED | OUTPATIENT
Start: 2022-03-22 | End: 2022-07-21

## 2022-04-06 DIAGNOSIS — G25.81 RESTLESS LEGS SYNDROME (RLS): ICD-10-CM

## 2022-04-07 RX ORDER — PRAMIPEXOLE DIHYDROCHLORIDE 0.5 MG/1
TABLET ORAL
Qty: 90 TABLET | Refills: 0 | Status: SHIPPED | OUTPATIENT
Start: 2022-04-07 | End: 2022-05-25

## 2022-04-07 NOTE — TELEPHONE ENCOUNTER
Establish care in July with Dr.McDonald Michelle     Last Written Prescription Date:  3.2.2022  Last Fill Quantity: 90,   # refills: 2  Last Office Visit: 10.14.2021  Future Office visit:       Routing refill request to provider for review/approval because:   [employer]tip[employer] Protocol Failed 04/06/2022 11:37 AM   Protocol Details  Recent (6 mo) or future (30 days) visit within the authorizing provider's specialty        Pended.    Felicity Coley RN

## 2022-04-28 DIAGNOSIS — G25.81 RESTLESS LEG SYNDROME: ICD-10-CM

## 2022-04-29 RX ORDER — GABAPENTIN 300 MG/1
300 CAPSULE ORAL
Qty: 90 CAPSULE | Refills: 0 | Status: SHIPPED | OUTPATIENT
Start: 2022-04-29 | End: 2022-07-27

## 2022-04-29 NOTE — TELEPHONE ENCOUNTER
gabapentin      Last Written Prescription Date:  2/14/22  Last Fill Quantity: 90,   # refills: 0  Last Office Visit: 10/14/21  Future Office visit:       Routing refill request to provider for review/approval because:  Drug not on the [facility], P or [facility] refill protocol or controlled substance

## 2022-05-23 DIAGNOSIS — G25.81 RESTLESS LEGS SYNDROME (RLS): ICD-10-CM

## 2022-05-24 NOTE — TELEPHONE ENCOUNTER
MIRAPEX    APPT WITH FITZPATRICK 7-  Last Written Prescription Date:  4-7-2022  Last Fill Quantity: 90,   # refills: 0  Last Office Visit: 10-  Future Office visit:       Routing refill request to provider for review/approval because:  Drug not on the [facility], [facility] or [facility] refill protocol or controlled substance

## 2022-05-25 ENCOUNTER — TELEPHONE (OUTPATIENT)
Dept: FAMILY MEDICINE | Facility: OTHER | Age: 67
End: 2022-05-25
Payer: COMMERCIAL

## 2022-05-25 RX ORDER — PRAMIPEXOLE DIHYDROCHLORIDE 0.5 MG/1
TABLET ORAL
Qty: 90 TABLET | Refills: 0 | Status: SHIPPED | OUTPATIENT
Start: 2022-05-25 | End: 2022-07-07

## 2022-07-04 ENCOUNTER — TRANSFERRED RECORDS (OUTPATIENT)
Dept: HEALTH INFORMATION MANAGEMENT | Facility: CLINIC | Age: 67
End: 2022-07-04

## 2022-07-06 DIAGNOSIS — G25.81 RESTLESS LEGS SYNDROME (RLS): ICD-10-CM

## 2022-07-07 RX ORDER — PRAMIPEXOLE DIHYDROCHLORIDE 0.5 MG/1
TABLET ORAL
Qty: 90 TABLET | Refills: 0 | Status: SHIPPED | OUTPATIENT
Start: 2022-07-07 | End: 2022-08-22

## 2022-07-07 NOTE — TELEPHONE ENCOUNTER
Establishing with Salome in August    pramipexole (MIRAPEX) 0.5 MG tablet      Last Written Prescription Date:  5/25/22  Last Fill Quantity: 90,   # refills: 0  Last Office Visit: 10/14/21  KIMBERLY Dolan  Future Office visit:       Routing refill request to provider for review/approval because:  Drug not on the [facility], [facility] or [facility] refill protocol or controlled substance

## 2022-07-19 DIAGNOSIS — E78.5 HYPERLIPIDEMIA LDL GOAL <130: ICD-10-CM

## 2022-07-21 RX ORDER — ROSUVASTATIN CALCIUM 5 MG/1
TABLET, COATED ORAL
Qty: 90 TABLET | Refills: 0 | Status: SHIPPED | OUTPATIENT
Start: 2022-07-21 | End: 2022-10-25

## 2022-07-25 DIAGNOSIS — E78.5 HYPERLIPIDEMIA LDL GOAL <130: ICD-10-CM

## 2022-07-26 DIAGNOSIS — G25.81 RESTLESS LEG SYNDROME: ICD-10-CM

## 2022-07-26 RX ORDER — ROSUVASTATIN CALCIUM 5 MG/1
TABLET, COATED ORAL
Qty: 90 TABLET | Refills: 0 | OUTPATIENT
Start: 2022-07-26

## 2022-07-26 NOTE — TELEPHONE ENCOUNTER
LOV 10/14/21  appt with Salome 08/15/22  gabapentin (NEURONTIN) 300 MG capsule 90 capsule 0 4/29/2022

## 2022-07-27 RX ORDER — GABAPENTIN 300 MG/1
300 CAPSULE ORAL
Qty: 90 CAPSULE | Refills: 0 | Status: SHIPPED | OUTPATIENT
Start: 2022-07-27 | End: 2022-10-28

## 2022-08-15 ENCOUNTER — OFFICE VISIT (OUTPATIENT)
Dept: FAMILY MEDICINE | Facility: OTHER | Age: 67
End: 2022-08-15
Attending: FAMILY MEDICINE
Payer: COMMERCIAL

## 2022-08-15 VITALS
HEART RATE: 74 BPM | SYSTOLIC BLOOD PRESSURE: 120 MMHG | TEMPERATURE: 98.1 F | RESPIRATION RATE: 15 BRPM | BODY MASS INDEX: 30.53 KG/M2 | WEIGHT: 190 LBS | HEIGHT: 66 IN | OXYGEN SATURATION: 96 % | DIASTOLIC BLOOD PRESSURE: 80 MMHG

## 2022-08-15 DIAGNOSIS — G25.81 RESTLESS LEGS SYNDROME: ICD-10-CM

## 2022-08-15 DIAGNOSIS — Z00.00 ENCOUNTER FOR MEDICAL EXAMINATION TO ESTABLISH CARE: Primary | ICD-10-CM

## 2022-08-15 DIAGNOSIS — N39.3 FEMALE STRESS INCONTINENCE: ICD-10-CM

## 2022-08-15 PROCEDURE — 99213 OFFICE O/P EST LOW 20 MIN: CPT | Performed by: STUDENT IN AN ORGANIZED HEALTH CARE EDUCATION/TRAINING PROGRAM

## 2022-08-15 PROCEDURE — G0463 HOSPITAL OUTPT CLINIC VISIT: HCPCS

## 2022-08-15 ASSESSMENT — PAIN SCALES - GENERAL: PAINLEVEL: NO PAIN (0)

## 2022-08-15 NOTE — PROGRESS NOTES
"  Assessment & Plan      Encounter for medical examination to establish care  Here to establish care    Restless legs syndrome  Well controlled with mirapex    Female stress incontinence  Ongoing for a long time  Consider pelvic floor PT?  Kegel exercises         BMI:   Estimated body mass index is 30.67 kg/m  as calculated from the following:    Height as of this encounter: 1.676 m (5' 6\").    Weight as of this encounter: 86.2 kg (190 lb).       No follow-ups on file.    Juany Mcmahon MD  [facility] - [place]OVANNI Ortiz is a 67 year old, presenting for the following health issues:  Establish Care      HPI       Has been dealing with incontinence for a while.  Takes her time urinating, wears a small pad  Doesn't feel that she needs to take medications    Hyperlipidemia Follow-Up      Are you regularly taking any medication or supplement to lower your cholesterol?   Yes- Crestor     Are you having muscle aches or other side effects that you think could be caused by your cholesterol lowering medication?  No    Hypertension Follow-up      Do you check your blood pressure regularly outside of the clinic? No     Are you following a low salt diet? Yes    Are your blood pressures ever more than 140 on the top number (systolic) OR more   than 90 on the bottom number (diastolic), for example 140/90? No        Review of Systems   Constitutional, HEENT, cardiovascular, pulmonary, gi and gu systems are negative, except as otherwise noted.      Objective    /80 (BP Location: Right arm, Patient Position: Chair)   Pulse 74   Temp 98.1  F (36.7  C)   Resp 15   Ht 1.676 m (5' 6\")   Wt 86.2 kg (190 lb)   SpO2 96%   BMI 30.67 kg/m    Body mass index is 30.67 kg/m .  Physical Exam   GENERAL: healthy, alert and no distress  EYES: Eyes grossly normal to inspection, PERRL and conjunctivae and sclerae normal  HENT: ear canals and TM's normal, nose and mouth without ulcers or lesions  NECK: no " adenopathy, no asymmetry, masses, or scars and thyroid normal to palpation  RESP: lungs clear to auscultation - no rales, rhonchi or wheezes  CV: regular rate and rhythm, normal S1 S2, no S3 or S4, no murmur, click or rub, no peripheral edema and peripheral pulses strong  ABDOMEN: soft, nontender, no hepatosplenomegaly, no masses and bowel sounds normal  MS: no gross musculoskeletal defects noted, no edema  SKIN: no suspicious lesions or rashes  NEURO: Normal strength and tone, mentation intact and speech normal  PSYCH: mentation appears normal, affect normal/bright

## 2022-08-15 NOTE — NURSING NOTE
"Chief Complaint   Patient presents with     Establish Care       Initial /80 (BP Location: Right arm, Patient Position: Chair)   Pulse 74   Temp 98.1  F (36.7  C)   Resp 15   Ht 1.676 m (5' 6\")   Wt 86.2 kg (190 lb)   SpO2 96%   BMI 30.67 kg/m   Estimated body mass index is 30.67 kg/m  as calculated from the following:    Height as of this encounter: 1.676 m (5' 6\").    Weight as of this encounter: 86.2 kg (190 lb).  Medication Reconciliation: complete  Rosette Powell LPN    "

## 2022-08-19 DIAGNOSIS — G25.81 RESTLESS LEGS SYNDROME (RLS): ICD-10-CM

## 2022-08-22 RX ORDER — PRAMIPEXOLE DIHYDROCHLORIDE 0.5 MG/1
TABLET ORAL
Qty: 90 TABLET | Refills: 3 | Status: SHIPPED | OUTPATIENT
Start: 2022-08-22 | End: 2023-03-07

## 2022-08-27 DIAGNOSIS — M81.0 AGE RELATED OSTEOPOROSIS, UNSPECIFIED PATHOLOGICAL FRACTURE PRESENCE: ICD-10-CM

## 2022-08-29 NOTE — TELEPHONE ENCOUNTER
Fosamax      Last Written Prescription Date:  2/8/22  Last Fill Quantity: 12,   # refills: 1  Last Office Visit: 8/15/22  Future Office visit:    Next 5 appointments (look out 90 days)    Oct 19, 2022 11:20 AM  (Arrive by 11:05 AM)  SHORT with Juany Mcmahon MD  [facility] ([facility] [place] ) [address]  [place] [address]  [phone]           Routing refill request to provider for review/approval because:

## 2022-08-30 RX ORDER — ALENDRONATE SODIUM 70 MG/1
TABLET ORAL
Qty: 12 TABLET | Refills: 1 | Status: SHIPPED | OUTPATIENT
Start: 2022-08-30 | End: 2023-04-24

## 2022-09-04 ENCOUNTER — HEALTH MAINTENANCE LETTER (OUTPATIENT)
Age: 67
End: 2022-09-04

## 2022-09-14 DIAGNOSIS — I10 BENIGN ESSENTIAL HYPERTENSION: ICD-10-CM

## 2022-09-14 NOTE — TELEPHONE ENCOUNTER
lisinopril      Last Written Prescription Date:  12/20/21  Last Fill Quantity: 90,   # refills: 3  Last Office Visit: 8/15/22  Future Office visit:    Next 5 appointments (look out 90 days)    Oct 19, 2022 11:20 AM  (Arrive by 11:05 AM)  SHORT with Juany Mcmahon MD  [facility] ([facility] ) [address]  [place] [address]  [phone]           Routing refill request to provider for review/approval because:

## 2022-09-15 RX ORDER — LISINOPRIL 10 MG/1
10 TABLET ORAL DAILY
Qty: 90 TABLET | Refills: 0 | Status: SHIPPED | OUTPATIENT
Start: 2022-09-15 | End: 2022-12-15

## 2022-10-18 NOTE — PROGRESS NOTES
"SUBJECTIVE:   Diana is a 67 year old who presents for Preventive Visit.    Patient has been advised of split billing requirements and indicates understanding: Yes  Are you in the first 12 months of your Medicare coverage?  No    Using small pad incontinence-   Tries to give self enough time on toilet.   Restless legs well controlled on Mirapex.    Stopped vitron C- hard on stomach, causing constipation.  Has been having more BM.    Walks and takes care of property.    No discharge, no vaginal bleeding no abdominal pain.    Colonoscopy- due in 2024  Shoulder range of motion is reduced- Found self using it less- has found she was babying that side.  Not as strong.   Has been on Fosamax for the last 7 years.      Healthy Habits:     In general, how would you rate your overall health?  Good    Frequency of exercise:  2-3 days/week    Duration of exercise:  15-30 minutes    Do you usually eat at least 4 servings of fruit and vegetables a day, include whole grains    & fiber and avoid regularly eating high fat or \"junk\" foods?  No    Taking medications regularly:  Yes    Medication side effects:  Other    Ability to successfully perform activities of daily living:  No assistance needed    Home Safety:  No safety concerns identified    Hearing Impairment:  No hearing concerns    In the past 6 months, have you been bothered by leaking of urine? Yes    In general, how would you rate your overall mental or emotional health?  Good      PHQ-2 Total Score: 0    Additional concerns today:  No    Do you feel safe in your environment? Yes    Have you ever done Advance Care Planning? (For example, a Health Directive, POLST, or a discussion with a medical provider or your loved ones about your wishes): No, advance care planning information given to patient to review.  Patient declined advance care planning discussion at this time.     Last summer out in the yard, lost balance and fell over.    Fall risk  Fallen 2 or more times in the " past year?: No  Any fall with injury in the past year?: No  click delete button to remove this line now    Do you have sleep apnea, excessive snoring or daytime drowsiness?: no    Reviewed and updated as needed this visit by clinical staff                  Reviewed and updated as needed this visit by Provider                 Social History     Tobacco Use     Smoking status: Never     Smokeless tobacco: Never   Substance Use Topics     Alcohol use: Yes     Comment: Socially     If you drink alcohol do you typically have >3 drinks per day or >7 drinks per week? No    Current providers sharing in care for this patient include:  Patient Care Team:  Juany Mcmahon MD as PCP - General (Family Medicine)  Juany Mcmahon MD as Assigned PCP    The following health maintenance items are reviewed in Epic and correct as of today:  Health Maintenance   Topic Date Due     HEPATITIS B IMMUNIZATION (1 of 3 - 3-dose series) Never done     COVID-19 Vaccine (1) Never done     DTAP/TDAP/TD IMMUNIZATION (4 - Td or Tdap) 01/23/2022     INFLUENZA VACCINE (1) 09/01/2022     MEDICARE ANNUAL WELLNESS VISIT  10/14/2022     FALL RISK ASSESSMENT  08/15/2023     MAMMO SCREENING  09/23/2023     DEXA  10/25/2023     COLORECTAL CANCER SCREENING  10/23/2024     LIPID  10/14/2026     ADVANCE CARE PLANNING  10/14/2026     PHQ-2 (once per calendar year)  Completed     ZOSTER IMMUNIZATION  Completed     HEPATITIS C SCREENING  Addressed     Pneumococcal Vaccine: 65+ Years  Addressed     IPV IMMUNIZATION  Aged Out     MENINGITIS IMMUNIZATION  Aged Out       FHS-7:   Breast CA Risk Assessment (FHS-7) 9/23/2021   Did any of your first-degree relatives have breast or ovarian cancer? Yes     Mammogram Screening: Recommended mammography every 1-2 years with patient discussion and risk factor consideration  Pertinent mammograms are reviewed under the imaging tab.    Review of Systems  Constitutional, HEENT, cardiovascular, pulmonary, gi and gu systems are  "negative, except as otherwise noted.    OBJECTIVE:   There were no vitals taken for this visit. Estimated body mass index is 30.67 kg/m  as calculated from the following:    Height as of 8/15/22: 1.676 m (5' 6\").    Weight as of 8/15/22: 86.2 kg (190 lb).  Physical Exam  GENERAL: healthy, alert and no distress  EYES: Eyes grossly normal to inspection, PERRL and conjunctivae and sclerae normal  HENT: ear canals and TM's normal, nose and mouth without ulcers or lesions  NECK: no adenopathy, no asymmetry, masses, or scars and thyroid normal to palpation  RESP: lungs clear to auscultation - no rales, rhonchi or wheezes  BREAST: normal without masses, tenderness or nipple discharge and no palpable axillary masses or adenopathy  CV: regular rate and rhythm, normal S1 S2, no S3 or S4, no murmur, click or rub, no peripheral edema and peripheral pulses strong  ABDOMEN: soft, nontender, no hepatosplenomegaly, no masses and bowel sounds normal  MS: no gross musculoskeletal defects noted, no edema, globally decreased AROM in the right shoulder.  SKIN: no suspicious lesions or rashes  NEURO: Normal strength and tone, mentation intact and speech normal  PSYCH: mentation appears normal, affect normal/bright      ASSESSMENT / PLAN:   (Z00.00) Encounter for preventive health examination  Comment: 67 year old female here for preventative visit  Plan: Patient is due for mammogram.    Next colonoscopy due in 2024   She declines the COVID immunizations but agrees to annual flu vaccine today    (E55.9) Vitamin D deficiency  (primary encounter diagnosis)  Comment: History of osteoporosis, on fosamax for the last 7 years.  Bone mineral density has significantly improved.  Currently in osteopenic range.  Will check vitamin D to ensure that within normal range to optimize bone health  Plan: Vitamin D Deficiency        (E66.9,  Z68.31) Class 1 obesity without serious comorbidity with body mass index (BMI) of 31.0 to 31.9 in adult, unspecified " "obesity type  Comment: Discussed lifestyle and dietary changes to improve this  Plan: Dietary and lifestyle changes    (I10) Benign essential hypertension  Comment: Blood pressure in target range.  Continue present management  Due for routine labs  Plan: Lipid Profile (Chol, Trig, HDL, LDL calc),         Comprehensive metabolic panel, TSH, CBC with         platelets and differential         (Z23) Need for prophylactic vaccination and inoculation against influenza  Comment: Due for influenza vaccine, will administer today  Plan: INFLUENZA, QUAD, HIGH DOSE, PF, 65YR + (FLUZONE        HD)         (Z12.31) Encounter for screening mammogram for breast cancer  Comment: Due for annual mammogram, will order  Plan: MA Screen Bilateral w/Vadim         (M85.80) Osteopenia, unspecified location  Comment: History of osteoporosis, BMD significantly improved on Fosamax.  Has been taking for 7 years.  Most recent Dexa scan one year ago demonstrates this improvement.  Patient to continue resistance exercises, healthy eating, staying active.  We will repeat the DEXA scan next year.  I think she will continue to benefit if we keep her on the Fosamax until at least next year.  She is tolerating the medication well with no reported side effects  Plan: Continue Fosamax    COUNSELING:  Reviewed preventive health counseling, as reflected in patient instructions       Regular exercise       Healthy diet/nutrition       Bladder control       Fall risk prevention       Osteoporosis prevention/bone health    Estimated body mass index is 30.67 kg/m  as calculated from the following:    Height as of 8/15/22: 1.676 m (5' 6\").    Weight as of 8/15/22: 86.2 kg (190 lb).    Weight management plan: Discussed healthy diet and exercise guidelines    She reports that she has never smoked. She has never used smokeless tobacco.      Reviewed patients plan of care and provided an AVS. The Basic Care Plan (routine screening as documented in Health " Maintenance) for Diana meets the Care Plan requirement. This Care Plan has been established and reviewed with the Patient.    Counseling Resources:  ATP IV Guidelines  Pooled Cohorts Equation Calculator  Breast Cancer Risk Calculator  Breast Cancer: Medication to Reduce Risk  FRAX Risk Assessment  ICSI Preventive Guidelines  Dietary Guidelines for Americans, 2010  USDA's MyPlate  ASA Prophylaxis  Lung CA Screening    Juany Mcmahon MD  [facility] - [place]    Identified Health Risks:

## 2022-10-19 ENCOUNTER — OFFICE VISIT (OUTPATIENT)
Dept: FAMILY MEDICINE | Facility: OTHER | Age: 67
End: 2022-10-19
Attending: STUDENT IN AN ORGANIZED HEALTH CARE EDUCATION/TRAINING PROGRAM
Payer: COMMERCIAL

## 2022-10-19 VITALS
OXYGEN SATURATION: 96 % | SYSTOLIC BLOOD PRESSURE: 100 MMHG | HEIGHT: 65 IN | BODY MASS INDEX: 31.32 KG/M2 | RESPIRATION RATE: 18 BRPM | WEIGHT: 188 LBS | TEMPERATURE: 97.5 F | DIASTOLIC BLOOD PRESSURE: 70 MMHG | HEART RATE: 85 BPM

## 2022-10-19 DIAGNOSIS — E55.9 VITAMIN D DEFICIENCY: Primary | ICD-10-CM

## 2022-10-19 DIAGNOSIS — M85.80 OSTEOPENIA, UNSPECIFIED LOCATION: ICD-10-CM

## 2022-10-19 DIAGNOSIS — Z00.00 ENCOUNTER FOR PREVENTIVE HEALTH EXAMINATION: ICD-10-CM

## 2022-10-19 DIAGNOSIS — Z23 NEED FOR PROPHYLACTIC VACCINATION AND INOCULATION AGAINST INFLUENZA: ICD-10-CM

## 2022-10-19 DIAGNOSIS — Z12.31 ENCOUNTER FOR SCREENING MAMMOGRAM FOR BREAST CANCER: ICD-10-CM

## 2022-10-19 DIAGNOSIS — E66.811 CLASS 1 OBESITY WITHOUT SERIOUS COMORBIDITY WITH BODY MASS INDEX (BMI) OF 31.0 TO 31.9 IN ADULT, UNSPECIFIED OBESITY TYPE: ICD-10-CM

## 2022-10-19 DIAGNOSIS — I10 BENIGN ESSENTIAL HYPERTENSION: ICD-10-CM

## 2022-10-19 PROCEDURE — G0008 ADMIN INFLUENZA VIRUS VAC: HCPCS

## 2022-10-19 PROCEDURE — G0439 PPPS, SUBSEQ VISIT: HCPCS | Performed by: STUDENT IN AN ORGANIZED HEALTH CARE EDUCATION/TRAINING PROGRAM

## 2022-10-19 RX ORDER — GABAPENTIN 300 MG/1
300 CAPSULE ORAL
COMMUNITY
Start: 2021-09-13 | End: 2022-10-19

## 2022-10-19 RX ORDER — AMOXICILLIN 500 MG/1
CAPSULE ORAL
COMMUNITY
Start: 2022-02-14

## 2022-10-19 ASSESSMENT — ENCOUNTER SYMPTOMS
BREAST MASS: 0
MYALGIAS: 0
CONSTIPATION: 0
FREQUENCY: 1
WEAKNESS: 0
DIZZINESS: 1
HEARTBURN: 1
EYE PAIN: 0
CHILLS: 0
NAUSEA: 0
DYSURIA: 0
COUGH: 0
PALPITATIONS: 0
HEMATURIA: 0
FEVER: 0
ABDOMINAL PAIN: 0
DIARRHEA: 0
PARESTHESIAS: 0
HEMATOCHEZIA: 0
NERVOUS/ANXIOUS: 0
JOINT SWELLING: 0
ARTHRALGIAS: 1
SHORTNESS OF BREATH: 0
HEADACHES: 1
SORE THROAT: 0

## 2022-10-19 ASSESSMENT — ACTIVITIES OF DAILY LIVING (ADL): CURRENT_FUNCTION: NO ASSISTANCE NEEDED

## 2022-10-19 ASSESSMENT — PAIN SCALES - GENERAL: PAINLEVEL: NO PAIN (0)

## 2022-10-19 NOTE — NURSING NOTE
"Chief Complaint   Patient presents with     Wellness Visit       Initial /70   Pulse 85   Temp 97.5  F (36.4  C) (Tympanic)   Resp 18   Ht 1.657 m (5' 5.25\")   Wt 85.3 kg (188 lb)   SpO2 96%   BMI 31.05 kg/m   Estimated body mass index is 31.05 kg/m  as calculated from the following:    Height as of this encounter: 1.657 m (5' 5.25\").    Weight as of this encounter: 85.3 kg (188 lb).  Medication Reconciliation: complete  Poly Reid LPN      "

## 2022-10-20 ENCOUNTER — TELEPHONE (OUTPATIENT)
Dept: FAMILY MEDICINE | Facility: OTHER | Age: 67
End: 2022-10-20

## 2022-10-20 NOTE — TELEPHONE ENCOUNTER
Patient called again,   Wondering also if there was a prescription call into [facility] for ear drops.   Thank you

## 2022-10-20 NOTE — TELEPHONE ENCOUNTER
To: Nurse to Dr. Mcmahon  Would like to discuss status of physical therapy referral   Please call her back @ [phone].  Thank you

## 2022-10-23 DIAGNOSIS — M75.01 ADHESIVE CAPSULITIS OF RIGHT SHOULDER: Primary | ICD-10-CM

## 2022-10-24 NOTE — TELEPHONE ENCOUNTER
Eulogio Dinero. Thanks for the update from the patient.   I wasn't sure patient was 100% set on the PT which is why I didn't put the referral in at that time.   I did place the order and she should be hearing back soon.  I placed the order for PT here at [facility].  If I remember correctly, the ear drops are for the earwax?  If so, she can find them over the counter.

## 2022-10-25 DIAGNOSIS — E78.5 HYPERLIPIDEMIA LDL GOAL <130: ICD-10-CM

## 2022-10-26 ENCOUNTER — HOSPITAL ENCOUNTER (OUTPATIENT)
Dept: PHYSICAL THERAPY | Facility: HOSPITAL | Age: 67
Setting detail: THERAPIES SERIES
Discharge: HOME | End: 2022-10-26
Attending: STUDENT IN AN ORGANIZED HEALTH CARE EDUCATION/TRAINING PROGRAM
Payer: MEDICARE

## 2022-10-26 DIAGNOSIS — G25.81 RESTLESS LEG SYNDROME: ICD-10-CM

## 2022-10-26 DIAGNOSIS — M75.01 ADHESIVE CAPSULITIS OF RIGHT SHOULDER: ICD-10-CM

## 2022-10-26 PROCEDURE — 97161 PT EVAL LOW COMPLEX 20 MIN: CPT | Mod: GP

## 2022-10-26 PROCEDURE — 97110 THERAPEUTIC EXERCISES: CPT | Mod: GP

## 2022-10-26 PROCEDURE — 97140 MANUAL THERAPY 1/> REGIONS: CPT | Mod: GP

## 2022-10-26 NOTE — TELEPHONE ENCOUNTER
Tried calling patient back but got routed to voicemail.  She can actually continue taking the fosamax.  She has had a really good response thus far.  We will repeat another DEXA scan in the spring of 23 to determine if we should continue after that point but for now, continue please.

## 2022-10-26 NOTE — TELEPHONE ENCOUNTER
Patient calling for clarification following OV 10/19/22    Discusison of Fosamax use greater than 5 years.  Patient is asking is she should continue using this medication?        Recommendation for eardrops to manage earwax.  Writer relayed PCP note below that this med can be OTC.  Instructed patient to discuss with Pharmacist on products & pricing  Pt relayed understanding.      Pended to PCP to review & advise    T: [phone]

## 2022-10-26 NOTE — PROGRESS NOTES
10/26/22 0900   General Information   Type of Visit Initial OP Ortho PT Evaluation   Start of Care Date 10/26/22   Referring Physician Dr. Mcmahon   Patient/Family Goals Statement Pt. would like to improve her right arm strength and return to her prior level of function.   Orders Evaluate and Treat   Date of Order 10/23/22   Certification Required? Yes   Medical Diagnosis Adhesive capsulitis of right shoulder   Surgical/Medical history reviewed Yes   Precautions/Limitations no known precautions/limitations       Present No   Body Part(s)   Body Part(s) Shoulder   Presentation and Etiology   Pertinent history of current problem (include personal factors and/or comorbidities that impact the POC) Diana presents to therapy with right shoulder discomfort as a result of fall in July while she was gardening. She reports her pain was initially severe but has since improved. Today she reports a loss of strength in her right shoulder along with discomfort during overhead movement. She first noticed the loss of strength when bowling with her son as she found it difficult to lift the ball. She has been an avid exerciser in the past but has limited her performance for fear of worsening her shoulder. Pt. denies the presence of neck pain, headaches, or altered sensation.   Impairments D. Decreased ROM;E. Decreased flexibility;F. Decreased strength and endurance   Functional Limitations perform activities of daily living;perform desired leisure / sports activities   Symptom Location Pt. reports discomfort in the anterior aspect of her left shoulder worse with overhead movement.   How/Where did it occur With a fall   Onset date of current episode/exacerbation   (Pt. fell in July)   Chronicity Chronic   Pain rating (0-10 point scale) Denies pain   Frequency of pain/symptoms C. With activity   Pain/symptoms are: Other   Pain symptoms comment   (worse with activity)   Pain/symptoms exacerbated by C. Lifting;G.  Certain positions;H. Overhead reach;M. Other  (Sleeping on right side)   Pain/symptoms eased by C. Rest;E. Changing positions;F. Certain positions   Progression of symptoms since onset: Improved   Current / Previous Interventions   Diagnostic Tests: X-ray   X-ray Results   (Pt. reports a past Xray with [facility] which was negative for any fracture.)   Prior Level of Function   Prior Level of Function-Mobility independent   Prior Level of Function-ADLs independent   Current Level of Function   Patient role/employment history F. Retired   [facility]   Current equipment-Gait/Locomotion None   Current equipment-ADL None   Fall Risk Screen   Fall screen completed by PT   Have you fallen 2 or more times in the past year? No   Have you fallen and had an injury in the past year? Yes  (Gardening, watering the garden and tripped while pulling hose.)   Timed Up and Go score (seconds) NT   Is patient a fall risk? No   Abuse Screen (yes response referral indicated)   Feels Unsafe at Home or Work/School no   Feels Threatened by Someone no   Does Anyone Try to Keep You From Having Contact with Others or Doing Things Outside Your Home? no   Physical Signs of Abuse Present no   Presenting problem Shoulder pain   Patient needs abuse support services and resources No   Shoulder Objective Findings   Side (if bilateral, select both right and left) Right;Left   Cervical Screen (ROM, quadrant) WNL, pain with left sidebending and right rotation   Thoracic Mobility Screen WNL   Shoulder Flexibility Comments Tight through right trapezius   Neer's Test positive right   Wilkinson-Aldo Test positive right   Load and Shift Test negative   Sulcus Test negative for depression/step off   Crossover Test positive for pain   Palpation Palpable tension through right trapezius and deltoid.   Accessory Motion/Joint Mobility Improved motion and pain with inferior and posterior glides on right   Observation Pt. performs AROM with  substitution from deltoid and trapezius.   Right Shoulder Flexion AROM WNL pain mid range   Right Shoulder Flexion PROM WNL   Left Shoulder Flexion AROM WNL   Left Shoulder Flexion PROM WNL   Right Shoulder Abduction AROM WNL pain midrange   Right Shoulder Abduction PROM WNL   Left Shoulder Abduction AROM WNL   Left Shoulder Abduction PROM WNL   Right Shoulder ER AROM WNL   Right Shoulder ER PROM WNL   Left Shoulder ER AROM WNL   Left Shoulder ER PROM WNL   Right Shoulder IR AROM WNL pain end range   Right Shoulder IR PROM WNL   Left Shoulder IR AROM WNL   Left Shoulder IR PROM WNL   Right Shoulder Flexion Strength 4/5 pain   Left Shoulder Flexion Strength 5/5   Right Shoulder Abduction Strength 4/5 pain   Left Shoulder Abduction Strength 5/5   Right Shoulder ER Strength 4/5 pain   Left Shoulder ER Strength 5/5   Right Shoulder IR Strength 4/5 pain   Left Shoulder IR Strength 5/5   Planned Therapy Interventions   Planned Therapy Interventions joint mobilization;manual therapy;neuromuscular re-education;ROM;strengthening;stretching   Planned Modality Interventions   Planned Modality Interventions Electrical stimulation;Hot packs;Cryotherapy;TENS;Ultrasound   Clinical Impression   Criteria for Skilled Therapeutic Interventions Met yes, treatment indicated   PT Diagnosis Pt. presents to therapy with right shoulder discomfort and weakness following a fall in July limiting her ability to perform daily and recreational activities. Clinical testing consistent with a diagnosis of rotator cuff related pain syndrome with concomitant AC joint irritation.   Influenced by the following impairments discomfort, decreased UE strength, Decreased flexibility, Decreased functional activity tolerance, decreased pain free ROM   Functional limitations due to impairments Pt. reports discomfort with daily and recreational activities particularly overhead loading.   Clinical Presentation Stable/Uncomplicated   Clinical Presentation  Rationale Therapist discretion   Clinical Decision Making (Complexity) Low complexity   Therapy Frequency 2 times/Week   Predicted Duration of Therapy Intervention (days/wks) 8 weeks   Risk & Benefits of therapy have been explained Yes   Patient, Family & other staff in agreement with plan of care Yes   Clinical Impression Comments Pt. presents to therapy with right shoulder discomfort and weakness following a fall in July limiting her ability to perform daily and recreational activities. Clinical testing consistent with a diagnosis of rotator cuff related pain syndrome with concomitant AC joint irritation. Skilled physical therapy is warranted with the use of manual therapy and modalities for pain management along with therapeutic exercise to improve UE strength and activity tolerance.   Education Assessment   Preferred Learning Style Listening;Reading;Demonstration;Pictures/video   Barriers to Learning No barriers   ORTHO GOALS   PT Ortho Eval Goals 1;2;4;3   Ortho Goal 1   Goal Identifier LTG #1   Goal Description Pt. to be independent with correct performance of HEP to begin independent home management of her condition.   Target Date 12/21/22   Ortho Goal 2   Goal Identifier LTG #2   Goal Description Pt. to improve UE strength to 5/5 in all planes with minimal symptom reproduction to improve tolerance for daily and recreational activities.   Target Date 12/21/22   Ortho Goal 3   Goal Identifier LTG #3   Goal Description Pt. to report full pain free ROM of right shoulder to improve ease with overhead activities.   Target Date 12/21/22   Ortho Goal 4   Goal Identifier STG #1   Goal Description Pt. to report decreased discomfort with activities of daily living by 50% or greater to begin return to PLOF.   Target Date 11/23/22   Total Evaluation Time   PT Eval, Low Complexity Minutes (94162) 30   Therapy Certification   Certification date from 10/26/22   Certification date to 12/21/22   Medical Diagnosis Adhesive  capsulitis of right shoulder   I certify the need for these services furnished under this plan of treatment and while under my care. (Physician co-signature of this document indicates review and certification of the therapy plan).

## 2022-10-27 RX ORDER — ROSUVASTATIN CALCIUM 5 MG/1
TABLET, COATED ORAL
Qty: 90 TABLET | Refills: 0 | Status: SHIPPED | OUTPATIENT
Start: 2022-10-27 | End: 2023-01-24

## 2022-10-27 NOTE — TELEPHONE ENCOUNTER
rosuvastatin (CRESTOR) 5 MG tablet      Last Written Prescription Date:  7-21-22  Last Fill Quantity: 90,   # refills: 0  Last Office Visit: 10-19-22  Future Office visit:       Routing refill request to provider for review/approval because:   LDL on file in past 12 months

## 2022-10-28 RX ORDER — GABAPENTIN 300 MG/1
300 CAPSULE ORAL
Qty: 90 CAPSULE | Refills: 0 | Status: SHIPPED | OUTPATIENT
Start: 2022-10-28 | End: 2023-02-13

## 2022-10-28 NOTE — TELEPHONE ENCOUNTER
gabapentin      Last Written Prescription Date:  7/27/22  Last Fill Quantity: 90,   # refills: 0  Last Office Visit: 10/19/22  Future Office visit:

## 2022-11-02 ENCOUNTER — HOSPITAL ENCOUNTER (OUTPATIENT)
Dept: PHYSICAL THERAPY | Facility: HOSPITAL | Age: 67
Setting detail: THERAPIES SERIES
Discharge: HOME | End: 2022-11-02
Attending: STUDENT IN AN ORGANIZED HEALTH CARE EDUCATION/TRAINING PROGRAM
Payer: MEDICARE

## 2022-11-02 PROCEDURE — 97110 THERAPEUTIC EXERCISES: CPT | Mod: GP,CQ

## 2022-11-02 PROCEDURE — 97140 MANUAL THERAPY 1/> REGIONS: CPT | Mod: GP,CQ

## 2022-11-09 ENCOUNTER — HOSPITAL ENCOUNTER (OUTPATIENT)
Dept: PHYSICAL THERAPY | Facility: HOSPITAL | Age: 67
Setting detail: THERAPIES SERIES
Discharge: HOME | End: 2022-11-09
Attending: STUDENT IN AN ORGANIZED HEALTH CARE EDUCATION/TRAINING PROGRAM
Payer: MEDICARE

## 2022-11-09 PROCEDURE — 97110 THERAPEUTIC EXERCISES: CPT | Mod: GP

## 2022-11-09 PROCEDURE — 97140 MANUAL THERAPY 1/> REGIONS: CPT | Mod: GP

## 2022-11-11 ENCOUNTER — LAB (OUTPATIENT)
Dept: LAB | Facility: OTHER | Age: 67
End: 2022-11-11
Attending: STUDENT IN AN ORGANIZED HEALTH CARE EDUCATION/TRAINING PROGRAM
Payer: MEDICARE

## 2022-11-11 ENCOUNTER — OFFICE VISIT (OUTPATIENT)
Dept: FAMILY MEDICINE | Facility: OTHER | Age: 67
End: 2022-11-11
Attending: STUDENT IN AN ORGANIZED HEALTH CARE EDUCATION/TRAINING PROGRAM
Payer: MEDICARE

## 2022-11-11 VITALS
RESPIRATION RATE: 16 BRPM | WEIGHT: 180 LBS | HEIGHT: 66 IN | BODY MASS INDEX: 28.93 KG/M2 | SYSTOLIC BLOOD PRESSURE: 120 MMHG | DIASTOLIC BLOOD PRESSURE: 70 MMHG | OXYGEN SATURATION: 98 % | HEART RATE: 81 BPM | TEMPERATURE: 97.7 F

## 2022-11-11 DIAGNOSIS — I10 BENIGN ESSENTIAL HYPERTENSION: ICD-10-CM

## 2022-11-11 DIAGNOSIS — M81.0 AGE-RELATED OSTEOPOROSIS WITHOUT CURRENT PATHOLOGICAL FRACTURE: Primary | ICD-10-CM

## 2022-11-11 DIAGNOSIS — H61.21 IMPACTED CERUMEN OF RIGHT EAR: ICD-10-CM

## 2022-11-11 DIAGNOSIS — E55.9 VITAMIN D DEFICIENCY: ICD-10-CM

## 2022-11-11 LAB
ALBUMIN SERPL BCG-MCNC: 4.3 G/DL (ref 3.5–5.2)
ALP SERPL-CCNC: 59 U/L (ref 35–104)
ALT SERPL W P-5'-P-CCNC: 32 U/L (ref 10–35)
ANION GAP SERPL CALCULATED.3IONS-SCNC: 8 MMOL/L (ref 7–15)
AST SERPL W P-5'-P-CCNC: 22 U/L (ref 10–35)
BASOPHILS # BLD AUTO: 0 10E3/UL (ref 0–0.2)
BASOPHILS NFR BLD AUTO: 1 %
BILIRUB SERPL-MCNC: 0.5 MG/DL
BUN SERPL-MCNC: 18 MG/DL (ref 8–23)
CALCIUM SERPL-MCNC: 9.2 MG/DL (ref 8.8–10.2)
CHLORIDE SERPL-SCNC: 103 MMOL/L (ref 98–107)
CHOLEST SERPL-MCNC: 178 MG/DL
CREAT SERPL-MCNC: 0.75 MG/DL (ref 0.51–0.95)
DEPRECATED HCO3 PLAS-SCNC: 27 MMOL/L (ref 22–29)
EOSINOPHIL # BLD AUTO: 0.1 10E3/UL (ref 0–0.7)
EOSINOPHIL NFR BLD AUTO: 2 %
ERYTHROCYTE [DISTWIDTH] IN BLOOD BY AUTOMATED COUNT: 13.4 % (ref 10–15)
GFR SERPL CREATININE-BSD FRML MDRD: 86 ML/MIN/1.73M2
GLUCOSE SERPL-MCNC: 93 MG/DL (ref 70–99)
HCT VFR BLD AUTO: 43.9 % (ref 35–47)
HDLC SERPL-MCNC: 48 MG/DL
HGB BLD-MCNC: 14.6 G/DL (ref 11.7–15.7)
IMM GRANULOCYTES # BLD: 0 10E3/UL
IMM GRANULOCYTES NFR BLD: 0 %
LDLC SERPL CALC-MCNC: 99 MG/DL
LYMPHOCYTES # BLD AUTO: 1.6 10E3/UL (ref 0.8–5.3)
LYMPHOCYTES NFR BLD AUTO: 30 %
MCH RBC QN AUTO: 29.5 PG (ref 26.5–33)
MCHC RBC AUTO-ENTMCNC: 33.3 G/DL (ref 31.5–36.5)
MCV RBC AUTO: 89 FL (ref 78–100)
MONOCYTES # BLD AUTO: 0.4 10E3/UL (ref 0–1.3)
MONOCYTES NFR BLD AUTO: 8 %
NEUTROPHILS # BLD AUTO: 3.2 10E3/UL (ref 1.6–8.3)
NEUTROPHILS NFR BLD AUTO: 59 %
NONHDLC SERPL-MCNC: 130 MG/DL
NRBC # BLD AUTO: 0 10E3/UL
NRBC BLD AUTO-RTO: 0 /100
PLATELET # BLD AUTO: 237 10E3/UL (ref 150–450)
POTASSIUM SERPL-SCNC: 4.4 MMOL/L (ref 3.4–5.3)
PROT SERPL-MCNC: 7.1 G/DL (ref 6.4–8.3)
RBC # BLD AUTO: 4.95 10E6/UL (ref 3.8–5.2)
SODIUM SERPL-SCNC: 138 MMOL/L (ref 136–145)
TRIGL SERPL-MCNC: 156 MG/DL
TSH SERPL DL<=0.005 MIU/L-ACNC: 1.75 UIU/ML (ref 0.3–4.2)
WBC # BLD AUTO: 5.4 10E3/UL (ref 4–11)

## 2022-11-11 PROCEDURE — 99213 OFFICE O/P EST LOW 20 MIN: CPT | Performed by: STUDENT IN AN ORGANIZED HEALTH CARE EDUCATION/TRAINING PROGRAM

## 2022-11-11 PROCEDURE — 82310 ASSAY OF CALCIUM: CPT | Mod: ZL

## 2022-11-11 PROCEDURE — 84443 ASSAY THYROID STIM HORMONE: CPT | Mod: ZL

## 2022-11-11 PROCEDURE — 80061 LIPID PANEL: CPT | Mod: ZL

## 2022-11-11 PROCEDURE — G0463 HOSPITAL OUTPT CLINIC VISIT: HCPCS

## 2022-11-11 PROCEDURE — 82306 VITAMIN D 25 HYDROXY: CPT | Mod: ZL

## 2022-11-11 PROCEDURE — 36415 COLL VENOUS BLD VENIPUNCTURE: CPT | Mod: ZL

## 2022-11-11 PROCEDURE — 85025 COMPLETE CBC W/AUTO DIFF WBC: CPT | Mod: ZL

## 2022-11-11 ASSESSMENT — PAIN SCALES - GENERAL: PAINLEVEL: NO PAIN (0)

## 2022-11-11 NOTE — NURSING NOTE
"Chief Complaint   Patient presents with     Cerumen Impaction       Initial /70 (BP Location: Right arm, Patient Position: Chair)   Pulse 81   Temp 97.7  F (36.5  C)   Resp 16   Ht 1.676 m (5' 6\")   Wt 81.6 kg (180 lb)   SpO2 98%   BMI 29.05 kg/m   Estimated body mass index is 29.05 kg/m  as calculated from the following:    Height as of this encounter: 1.676 m (5' 6\").    Weight as of this encounter: 81.6 kg (180 lb).  Medication Reconciliation: complete  Rosette Powell LPN    "

## 2022-11-11 NOTE — PROGRESS NOTES
"  Assessment & Plan     Age-related osteoporosis without current pathological fracture  Patient with history of osteoporosis.  Previously on Fosamax for the last 7 years.  She should hold this.  We should repeat a DEXA scan and see where she is at.  If stable, holiday and then continue monitoring every 2-3 years for changes.  Her vitamin D level is good.  Continue weight bearing as much as possible and do resistance exercises.  - DX Hip/Pelvis/Spine; Future    Impacted cerumen of right ear  Last visit, she had bilaterally impacted cerumen.  I advised her to use Debrox BID which she has been doing.  Left ear resolved on its own and right ear cerumen flushed out easily with 2-3 sprays.  Advised patient to continue using Debrox intermittently (every 4-6 weeks for a few days       No follow-ups on file.    Juany Mcmahon MD  [facility] - [place]   Diana is a 67 year old female, presenting for the following health issues:  Cerumen Impaction          HPI     Concern - Ear wax   Description: Trying to do drops over the counter but not working   Intensity: mild  Progression of Symptoms:  same  Accompanying Signs & Symptoms: slight pain   Previous history of similar problem: none  Precipitating factors:        Worsened by: none  Alleviating factors:        Improved by: none  Therapies tried and outcome:  none       Review of Systems   Constitutional, HEENT, cardiovascular, pulmonary, gi and gu systems are negative, except as otherwise noted.      Objective    /70 (BP Location: Right arm, Patient Position: Chair)   Pulse 81   Temp 97.7  F (36.5  C)   Resp 16   Ht 1.676 m (5' 6\")   Wt 81.6 kg (180 lb)   SpO2 98%   BMI 29.05 kg/m    Body mass index is 29.05 kg/m .  Physical Exam   GENERAL: healthy, alert and no distress  EYES: Eyes grossly normal to inspection, PERRL and conjunctivae and sclerae normal  HENT: ear canals and TM's normal, nose and mouth without ulcers or lesions  NECK: no " adenopathy, no asymmetry, masses, or scars and thyroid normal to palpation  RESP: lungs clear to auscultation - no rales, rhonchi or wheezes  BREAST: normal without masses, tenderness or nipple discharge and no palpable axillary masses or adenopathy  CV: regular rate and rhythm, normal S1 S2, no S3 or S4, no murmur, click or rub, no peripheral edema and peripheral pulses strong  ABDOMEN: soft, nontender, no hepatosplenomegaly, no masses and bowel sounds normal  MS: no gross musculoskeletal defects noted, no edema  SKIN: no suspicious lesions or rashes  NEURO: Normal strength and tone, mentation intact and speech normal  PSYCH: mentation appears normal, affect normal/bright

## 2022-11-12 LAB — DEPRECATED CALCIDIOL+CALCIFEROL SERPL-MC: 46 UG/L (ref 20–75)

## 2022-11-15 ENCOUNTER — DOCUMENTATION ONLY (OUTPATIENT)
Dept: OTHER | Facility: CLINIC | Age: 67
End: 2022-11-15

## 2022-12-06 ENCOUNTER — ANCILLARY PROCEDURE (OUTPATIENT)
Dept: MAMMOGRAPHY | Facility: OTHER | Age: 67
End: 2022-12-06
Attending: STUDENT IN AN ORGANIZED HEALTH CARE EDUCATION/TRAINING PROGRAM
Payer: MEDICARE

## 2022-12-06 ENCOUNTER — HOSPITAL ENCOUNTER (OUTPATIENT)
Dept: BONE DENSITY | Facility: HOSPITAL | Age: 67
Discharge: HOME | End: 2022-12-06
Attending: STUDENT IN AN ORGANIZED HEALTH CARE EDUCATION/TRAINING PROGRAM
Payer: MEDICARE

## 2022-12-06 ENCOUNTER — TELEPHONE (OUTPATIENT)
Dept: MAMMOGRAPHY | Facility: OTHER | Age: 67
End: 2022-12-06

## 2022-12-06 DIAGNOSIS — Z12.31 ENCOUNTER FOR SCREENING MAMMOGRAM FOR BREAST CANCER: ICD-10-CM

## 2022-12-06 DIAGNOSIS — M81.0 AGE-RELATED OSTEOPOROSIS WITHOUT CURRENT PATHOLOGICAL FRACTURE: ICD-10-CM

## 2022-12-06 PROCEDURE — 77067 SCR MAMMO BI INCL CAD: CPT | Mod: TC

## 2022-12-06 PROCEDURE — 77080 DXA BONE DENSITY AXIAL: CPT

## 2022-12-12 NOTE — PROGRESS NOTES
[facility]    Outpatient Physical Therapy Discharge Note  Patient: Diana Ware  : 1955    Beginning/End Dates of Reporting Period:  10/26/22 to 22    Referring Provider: Dr. Mcmahon    Therapy Diagnosis: Pt. presents to therapy with right shoulder discomfort and weakness following a fall in July limiting her ability to perform daily and recreational activities. Clinical testing consistent with a diagnosis of rotator cuff related pain syndrome with concomitant AC joint irritation.     Client Self Report: Diana is doing very well today. She voices no pain in her right shoulder at the time of today's visit. Does feel like things are continuing to improve.    Goals:  Goal Identifier LTG #1   Goal Description Pt. to be independent with correct performance of HEP to begin independent home management of her condition.   Target Date 22   Date Met  (P) 22   Progress (detail required for progress note): (P) Met: Pt. to begin independent home management at this time.     Goal Identifier LTG #2   Goal Description Pt. to improve UE strength to 5/5 in all planes with minimal symptom reproduction to improve tolerance for daily and recreational activities.   Target Date 22   Date Met  (P) 22   Progress (detail required for progress note): (P) MET     Goal Identifier LTG #3   Goal Description Pt. to report full pain free ROM of right shoulder to improve ease with overhead activities.   Target Date 22   Date Met  (P) 22   Progress (detail required for progress note): (P) Met     Goal Identifier STG #1   Goal Description Pt. to report decreased discomfort with activities of daily living by 50% or greater to begin return to PLOF.   Target Date 22   Date Met  (P) 22   Progress (detail required for progress note): (P) MET       Plan:  Discharge from  therapy.    Discharge:    Reason for Discharge: Patient has met all goals.  Patient chooses to discontinue therapy.    Equipment Issued: None    Discharge Plan: Patient to continue home program.

## 2022-12-14 DIAGNOSIS — I10 BENIGN ESSENTIAL HYPERTENSION: ICD-10-CM

## 2022-12-14 NOTE — TELEPHONE ENCOUNTER
Lisinopril       Last Written Prescription Date:  9/15/22  Last Fill Quantity: 90,   # refills: 0  Last Office Visit: 11/11/22  Future Office visit:

## 2022-12-15 RX ORDER — LISINOPRIL 10 MG/1
TABLET ORAL
Qty: 90 TABLET | Refills: 1 | Status: SHIPPED | OUTPATIENT
Start: 2022-12-15 | End: 2023-01-01

## 2023-01-01 ENCOUNTER — TELEPHONE (OUTPATIENT)
Dept: MAMMOGRAPHY | Facility: OTHER | Age: 68
End: 2023-01-01

## 2023-01-01 ENCOUNTER — ANCILLARY PROCEDURE (OUTPATIENT)
Dept: MAMMOGRAPHY | Facility: OTHER | Age: 68
End: 2023-01-01
Attending: STUDENT IN AN ORGANIZED HEALTH CARE EDUCATION/TRAINING PROGRAM
Payer: MEDICARE

## 2023-01-01 ENCOUNTER — OFFICE VISIT (OUTPATIENT)
Dept: FAMILY MEDICINE | Facility: OTHER | Age: 68
End: 2023-01-01
Attending: STUDENT IN AN ORGANIZED HEALTH CARE EDUCATION/TRAINING PROGRAM
Payer: COMMERCIAL

## 2023-01-01 ENCOUNTER — HEALTH MAINTENANCE LETTER (OUTPATIENT)
Age: 68
End: 2023-01-01

## 2023-01-01 ENCOUNTER — DOCUMENTATION ONLY (OUTPATIENT)
Dept: OTHER | Facility: CLINIC | Age: 68
End: 2023-01-01

## 2023-01-01 VITALS
HEART RATE: 86 BPM | TEMPERATURE: 97.5 F | OXYGEN SATURATION: 97 % | WEIGHT: 199.31 LBS | BODY MASS INDEX: 32.17 KG/M2 | SYSTOLIC BLOOD PRESSURE: 121 MMHG | DIASTOLIC BLOOD PRESSURE: 79 MMHG

## 2023-01-01 DIAGNOSIS — G25.81 RESTLESS LEG SYNDROME: ICD-10-CM

## 2023-01-01 DIAGNOSIS — Z12.31 ENCOUNTER FOR SCREENING MAMMOGRAM FOR MALIGNANT NEOPLASM OF BREAST: ICD-10-CM

## 2023-01-01 DIAGNOSIS — E78.5 HYPERLIPIDEMIA LDL GOAL <130: ICD-10-CM

## 2023-01-01 DIAGNOSIS — I10 BENIGN ESSENTIAL HYPERTENSION: ICD-10-CM

## 2023-01-01 DIAGNOSIS — M81.0 AGE RELATED OSTEOPOROSIS, UNSPECIFIED PATHOLOGICAL FRACTURE PRESENCE: ICD-10-CM

## 2023-01-01 PROCEDURE — 99214 OFFICE O/P EST MOD 30 MIN: CPT | Performed by: STUDENT IN AN ORGANIZED HEALTH CARE EDUCATION/TRAINING PROGRAM

## 2023-01-01 PROCEDURE — G0463 HOSPITAL OUTPT CLINIC VISIT: HCPCS

## 2023-01-01 PROCEDURE — 77067 SCR MAMMO BI INCL CAD: CPT | Mod: TC

## 2023-01-01 RX ORDER — ROSUVASTATIN CALCIUM 5 MG/1
TABLET, COATED ORAL
Qty: 90 TABLET | Refills: 0 | OUTPATIENT
Start: 2023-01-01

## 2023-01-01 RX ORDER — ROSUVASTATIN CALCIUM 5 MG/1
TABLET, COATED ORAL
Qty: 90 TABLET | Refills: 0 | Status: SHIPPED | OUTPATIENT
Start: 2023-01-01 | End: 2024-01-01

## 2023-01-01 RX ORDER — GABAPENTIN 300 MG/1
300 CAPSULE ORAL
Qty: 90 CAPSULE | Refills: 0 | Status: SHIPPED | OUTPATIENT
Start: 2023-01-01 | End: 2024-01-01

## 2023-01-01 RX ORDER — ALENDRONATE SODIUM 70 MG/1
TABLET ORAL
Qty: 12 TABLET | Refills: 1 | Status: SHIPPED | OUTPATIENT
Start: 2023-01-01

## 2023-01-01 RX ORDER — LISINOPRIL 10 MG/1
10 TABLET ORAL DAILY
Qty: 90 TABLET | Refills: 1 | Status: SHIPPED | OUTPATIENT
Start: 2023-01-01 | End: 2023-01-01

## 2023-01-01 RX ORDER — PRAMIPEXOLE DIHYDROCHLORIDE 0.5 MG/1
TABLET ORAL
Qty: 90 TABLET | Refills: 3 | Status: SHIPPED | OUTPATIENT
Start: 2023-01-01 | End: 2024-01-01

## 2023-01-01 RX ORDER — LISINOPRIL 10 MG/1
10 TABLET ORAL DAILY
Qty: 90 TABLET | Refills: 1 | Status: SHIPPED | OUTPATIENT
Start: 2023-01-01

## 2023-01-01 ASSESSMENT — PAIN SCALES - GENERAL: PAINLEVEL: NO PAIN (0)

## 2023-01-23 DIAGNOSIS — E78.5 HYPERLIPIDEMIA LDL GOAL <130: ICD-10-CM

## 2023-01-24 RX ORDER — ROSUVASTATIN CALCIUM 5 MG/1
TABLET, COATED ORAL
Qty: 90 TABLET | Refills: 0 | Status: SHIPPED | OUTPATIENT
Start: 2023-01-24 | End: 2023-05-02

## 2023-01-24 NOTE — TELEPHONE ENCOUNTER
rosuvastatin (CRESTOR) 5 MG tablet    Last Written Prescription Date:  10-27-22  Last Fill Quantity: 90,   # refills: 0  Last Office Visit: 11-11-22  Future Office visit:       Routing refill request to provider for review/approval because:

## 2023-02-09 DIAGNOSIS — G25.81 RESTLESS LEG SYNDROME: ICD-10-CM

## 2023-02-13 RX ORDER — GABAPENTIN 300 MG/1
300 CAPSULE ORAL
Qty: 90 CAPSULE | Refills: 0 | Status: SHIPPED | OUTPATIENT
Start: 2023-02-13 | End: 2023-05-02

## 2023-03-07 DIAGNOSIS — G25.81 RESTLESS LEGS SYNDROME (RLS): ICD-10-CM

## 2023-03-07 RX ORDER — PRAMIPEXOLE DIHYDROCHLORIDE 0.5 MG/1
TABLET ORAL
Qty: 90 TABLET | Refills: 3 | Status: SHIPPED | OUTPATIENT
Start: 2023-03-07 | End: 2023-07-31

## 2023-04-24 DIAGNOSIS — M81.0 AGE RELATED OSTEOPOROSIS, UNSPECIFIED PATHOLOGICAL FRACTURE PRESENCE: ICD-10-CM

## 2023-04-24 RX ORDER — ALENDRONATE SODIUM 70 MG/1
TABLET ORAL
Qty: 12 TABLET | Refills: 1 | Status: SHIPPED | OUTPATIENT
Start: 2023-04-24 | End: 2023-01-01

## 2023-05-01 DIAGNOSIS — E78.5 HYPERLIPIDEMIA LDL GOAL <130: ICD-10-CM

## 2023-05-01 DIAGNOSIS — G25.81 RESTLESS LEG SYNDROME: ICD-10-CM

## 2023-05-02 RX ORDER — ROSUVASTATIN CALCIUM 5 MG/1
TABLET, COATED ORAL
Qty: 90 TABLET | Refills: 0 | Status: SHIPPED | OUTPATIENT
Start: 2023-05-02 | End: 2023-08-14

## 2023-05-02 RX ORDER — GABAPENTIN 300 MG/1
300 CAPSULE ORAL
Qty: 90 CAPSULE | Refills: 0 | Status: SHIPPED | OUTPATIENT
Start: 2023-05-02 | End: 2023-01-01

## 2023-05-02 NOTE — TELEPHONE ENCOUNTER
Crestor, Gabapentin      Last Written Prescription Date:  1.24.23, 2.13.23  Last Fill Quantity: #90, #90,   # refills: 0  Last Office Visit: 11.11.22  Future Office visit:       Routing refill request to provider for review/approval because:  Drug not on the [facility], [facility] or [facility] refill protocol or controlled substance

## 2023-06-15 ENCOUNTER — OFFICE VISIT (OUTPATIENT)
Dept: FAMILY MEDICINE | Facility: OTHER | Age: 68
End: 2023-06-15
Attending: STUDENT IN AN ORGANIZED HEALTH CARE EDUCATION/TRAINING PROGRAM
Payer: COMMERCIAL

## 2023-06-15 VITALS
HEART RATE: 78 BPM | TEMPERATURE: 97 F | SYSTOLIC BLOOD PRESSURE: 120 MMHG | OXYGEN SATURATION: 96 % | WEIGHT: 185.2 LBS | DIASTOLIC BLOOD PRESSURE: 78 MMHG | RESPIRATION RATE: 17 BRPM | BODY MASS INDEX: 29.89 KG/M2

## 2023-06-15 DIAGNOSIS — G25.81 RESTLESS LEG SYNDROME: Primary | ICD-10-CM

## 2023-06-15 PROCEDURE — 99213 OFFICE O/P EST LOW 20 MIN: CPT | Performed by: STUDENT IN AN ORGANIZED HEALTH CARE EDUCATION/TRAINING PROGRAM

## 2023-06-15 PROCEDURE — G0463 HOSPITAL OUTPT CLINIC VISIT: HCPCS

## 2023-06-15 RX ORDER — NALTREXONE 100 %
1 POWDER (GRAM) MISCELLANEOUS AT BEDTIME
Qty: 0.09 G | Refills: 1 | Status: SHIPPED | OUTPATIENT
Start: 2023-06-15

## 2023-06-15 ASSESSMENT — PAIN SCALES - GENERAL: PAINLEVEL: NO PAIN (0)

## 2023-06-15 NOTE — PROGRESS NOTES
"  Assessment & Plan     Restless leg syndrome  We discussed different options for treatment of this.  Patient brings up opioids, would like to avoid this given her age, risk of physical dependence, tolerance, etc.  She is agreeable to trial of LDN.  - Naltrexone POWD; 1 mg At Bedtime Start with 1 mg tablet at bedtime for 1 week then increase to 2 mg at bedtime for a week then increase to 3 mg at bedtime.       BMI:   Estimated body mass index is 29.89 kg/m  as calculated from the following:    Height as of 11/11/22: 1.676 m (5' 6\").    Weight as of this encounter: 84 kg (185 lb 3.2 oz).       Return in about 2 months (around 8/15/2023) for Follow up.    Juany Mcmahon MD  [facility] - GIOVANNI Ortiz is a 68 year old, presenting for the following health issues:  restless legs    HPI     Has had this disorder since she was a teen  Mirapex 30+ years ago.  Has initially made a difference and now doesn't work.    Tried gabapentin and feels unsteady  Is on a multivitamin  Has tried iron which they recommended at the [facility]- felt sick to her stomach.  In the bathroom constantly.  Member of [employer].    Dopamine agonists, oral iron, mild opioid dosage.  Sometimes up all night.  Sometimes it takes her until 2-3 in the morning to sleep.    Concern - Restless leg syndrom  Onset: prior  Progression of Symptoms:  worsening  Previous history of similar problem: yes  Precipitating factors:        Worsened by: nothing  Alleviating factors:        Improved by: nothing  Therapies tried and outcome: has tried a few different medications, would like to discuss changing it.      Review of Systems   Constitutional, HEENT, cardiovascular, pulmonary, GI, , musculoskeletal, neuro, skin, endocrine and psych systems are negative, except as otherwise noted.      Objective    /78 (BP Location: Right arm, Patient Position: Sitting, Cuff Size: Adult Regular)   Pulse 78   Temp 97  F (36.1  C) " (Tympanic)   Resp 17   Wt 84 kg (185 lb 3.2 oz)   SpO2 96%   BMI 29.89 kg/m    Body mass index is 29.89 kg/m .  Physical Exam   GENERAL: healthy, alert and no distress  EYES: Eyes grossly normal to inspection, PERRL and conjunctivae and sclerae normal  HENT: ear canals and TM's normal, nose and mouth without ulcers or lesions  NECK: no adenopathy, no asymmetry, masses, or scars and thyroid normal to palpation  RESP: lungs clear to auscultation - no rales, rhonchi or wheezes  CV: regular rate and rhythm, normal S1 S2, no S3 or S4, no murmur, click or rub, no peripheral edema and peripheral pulses strong  ABDOMEN: soft, nontender, no hepatosplenomegaly, no masses and bowel sounds normal  MS: no gross musculoskeletal defects noted, no edema  SKIN: no suspicious lesions or rashes  NEURO: Normal strength and tone, mentation intact and speech normal  PSYCH: mentation appears normal, affect normal/bright

## 2023-07-31 DIAGNOSIS — G25.81 RESTLESS LEGS SYNDROME (RLS): ICD-10-CM

## 2023-07-31 RX ORDER — PRAMIPEXOLE DIHYDROCHLORIDE 0.5 MG/1
TABLET ORAL
Qty: 90 TABLET | Refills: 3 | Status: SHIPPED | OUTPATIENT
Start: 2023-07-31 | End: 2023-01-01

## 2023-07-31 NOTE — TELEPHONE ENCOUNTER
Miraprex 0.5 mg      Last Written Prescription Date:  03/07/2023  Last Fill Quantity: 90,   # refills: 3  Last Office Visit: 06/15/2023  Future Office visit:    Next 5 appointments (look out 90 days)      Aug 15, 2023  2:00 PM  (Arrive by 1:45 PM)  SHORT with Juany Mcmahon MD  [facility] ([facility] ) [address]  [place] [address]  [phone]             Routing refill request to provider for review/approval because:  Drug not on the [facility], [facility] or [facility] refill protocol or controlled substance

## 2023-08-14 DIAGNOSIS — E78.5 HYPERLIPIDEMIA LDL GOAL <130: ICD-10-CM

## 2023-08-14 RX ORDER — ROSUVASTATIN CALCIUM 5 MG/1
TABLET, COATED ORAL
Qty: 90 TABLET | Refills: 0 | Status: SHIPPED | OUTPATIENT
Start: 2023-08-14 | End: 2023-01-01

## 2023-10-24 NOTE — TELEPHONE ENCOUNTER
Reason for call:  Medication      Have you contacted your pharmacy? Yes   but could be under a different last name (Lauro) If patient has contacted Pharmacy and it has been over 72hrs, continue to #2  Medication linsinpril   What Pharmacy do you use? [facility]       (Please note that the turn-around-time for prescriptions is 72 business hours; I am sending your request at this time. SEND TO  [place] [facility]ill [facility]  )

## 2023-10-30 NOTE — TELEPHONE ENCOUNTER
Patient calling this morning needing her lisinopril refilled she called last week and this hasn't been filled yet and she is out of this medication and needs this sent to [facility]

## 2023-10-31 NOTE — TELEPHONE ENCOUNTER
Patient called regarding Lisinopril refill. Refill was sent to [facility] in [place] and she requested [facility] in [place].

## 2023-11-01 NOTE — TELEPHONE ENCOUNTER
Lisinopril       Last Written Prescription Date:  10/30/2023  Last Fill Quantity: 90,   # refills: 1  Last Office Visit: 6/15/2023  Future Office visit:    Next 5 appointments (look out 90 days)      Nov 28, 2023  4:00 PM  (Arrive by 3:45 PM)  SHORT with Juany Mcmahon MD  [facility] ([facility] [place] ) [address]  [place] [address]  [phone]             Routing refill request to provider for review/approval because:  Drug not on the [facility], [facility] or [facility] refill protocol or controlled substance

## 2023-11-21 NOTE — PROGRESS NOTES
Assessment & Plan     Age related osteoporosis, unspecified pathological fracture presence  Continues with fosamax and tolerating well  Most recent DEXA scan on 12/06/2022 shows osteoporosis with high fracture risk.  Will repeat DEXA scan to reassess treatment progress.  If not significant improvement still, will consider switching to Reclast.  - alendronate (FOSAMAX) 70 MG tablet; TAKE 1 TABLET BY MOUTH ONCEWEEKLY ON AN EMPTY STOMACH  - DX Hip/Pelvis/Spine; Future    Restless leg syndrome  We discussed treatment options for restless legs.  Patient would like to try gabapentin.  Discussed potential side effects  She also tells me that she has done research on this topic extensively and found some evidence to support use of compression stockings in the palliation of restless leg symptoms.  Consider TENS unit?  - gabapentin (NEURONTIN) 300 MG capsule; Take 1 capsule (300 mg) by mouth at bedtime as needed, may repeat once (restless legs)  - Compression Sleeve/Stocking Order for DME - ONLY FOR DME   pramipexole (MIRAPEX) 0.5 MG tablet; TAKE 1-2 TABLETS BY MOUTH 2-3 HOURS BEFORE BEDTIME    Benign essential hypertension  BP well controlled and in target range.  - lisinopril (ZESTRIL) 10 MG tablet; Take 1 tablet (10 mg) by mouth daily    Hyperlipidemia LDL goal <130  Tolerating well.  I do not think this is contributing to patient's symptoms of restless legs.  - rosuvastatin (CRESTOR) 5 MG tablet; TAKE 1 TABLET BY MOUTH EVERY DAY      Juany Mcmahon MD  [facility] - GIOVANNI Ortiz is a 68 year old, presenting for the following health issues:  Lipids and Hypertension      HPI       Would like DME for compression stockings   TENS unit for restless legs  Went off LDN as she didn't feel that it was making any difference, even as she was titrating the dose up.    Taking two of the 0.5 mg of the mirapex.  In the evening, taking one   [employer] works most of the time.    Restless leg syndrome  foundation. TENS unit?  Opioids for refractory cases?  Patient tells me she is not ready for that.  She is willing to stick to gabapentin for now.      Hyperlipidemia Follow-Up    Are you regularly taking any medication or supplement to lower your cholesterol?   Yes- Crestor   Are you having muscle aches or other side effects that you think could be caused by your cholesterol lowering medication?  No    Hypertension Follow-up    Do you check your blood pressure regularly outside of the clinic? No   Are you following a low salt diet? Yes  Are your blood pressures ever more than 140 on the top number (systolic) OR more   than 90 on the bottom number (diastolic), for example 140/90? Unknown patient not checking blood pressure outside of the clinic     Review of Systems   Constitutional, HEENT, cardiovascular, pulmonary, gi and gu systems are negative, except as otherwise noted.      Objective    /79 (BP Location: Left arm, Patient Position: Sitting, Cuff Size: Adult Regular)   Pulse 86   Temp 97.5  F (36.4  C) (Tympanic)   Wt 90.4 kg (199 lb 5 oz)   SpO2 97%   BMI 32.17 kg/m    Body mass index is 32.17 kg/m .  Physical Exam   GENERAL: healthy, alert and no distress  EYES: Eyes grossly normal to inspection, PERRL and conjunctivae and sclerae normal  HENT: ear canals and TM's normal, nose and mouth without ulcers or lesions  NECK: no adenopathy, no asymmetry, masses, or scars and thyroid normal to palpation  RESP: lungs clear to auscultation - no rales, rhonchi or wheezes  CV: regular rate and rhythm, normal S1 S2, no S3 or S4, no murmur, click or rub, no peripheral edema and peripheral pulses strong  ABDOMEN: soft, nontender, no hepatosplenomegaly, no masses and bowel sounds normal  MS: no gross musculoskeletal defects noted, no edema  SKIN: no suspicious lesions or rashes  NEURO: Normal strength and tone, mentation intact and speech normal  PSYCH: mentation appears normal, affect normal/bright

## 2024-01-01 ENCOUNTER — DOCUMENTATION ONLY (OUTPATIENT)
Dept: OTHER | Facility: CLINIC | Age: 69
End: 2024-01-01

## 2024-01-01 ENCOUNTER — OFFICE VISIT (OUTPATIENT)
Dept: FAMILY MEDICINE | Facility: OTHER | Age: 69
End: 2024-01-01
Attending: NURSE PRACTITIONER
Payer: COMMERCIAL

## 2024-01-01 ENCOUNTER — TELEPHONE (OUTPATIENT)
Dept: FAMILY MEDICINE | Facility: OTHER | Age: 69
End: 2024-01-01

## 2024-01-01 ENCOUNTER — HOSPITAL ENCOUNTER (EMERGENCY)
Facility: HOSPITAL | Age: 69
Discharge: ANOTHER HOSPITAL | End: 2024-08-10
Attending: EMERGENCY MEDICINE | Admitting: EMERGENCY MEDICINE
Payer: MEDICARE

## 2024-01-01 ENCOUNTER — NURSE TRIAGE (OUTPATIENT)
Dept: NURSING | Facility: CLINIC | Age: 69
End: 2024-01-01

## 2024-01-01 ENCOUNTER — TRANSFERRED RECORDS (OUTPATIENT)
Dept: HEALTH INFORMATION MANAGEMENT | Facility: CLINIC | Age: 69
End: 2024-01-01

## 2024-01-01 ENCOUNTER — LAB (OUTPATIENT)
Dept: LAB | Facility: OTHER | Age: 69
End: 2024-01-01
Payer: MEDICARE

## 2024-01-01 ENCOUNTER — APPOINTMENT (OUTPATIENT)
Dept: GENERAL RADIOLOGY | Facility: HOSPITAL | Age: 69
End: 2024-01-01
Attending: EMERGENCY MEDICINE
Payer: MEDICARE

## 2024-01-01 ENCOUNTER — HOSPITAL ENCOUNTER (OUTPATIENT)
Dept: BONE DENSITY | Facility: HOSPITAL | Age: 69
Discharge: HOME | End: 2024-01-04
Attending: STUDENT IN AN ORGANIZED HEALTH CARE EDUCATION/TRAINING PROGRAM | Admitting: STUDENT IN AN ORGANIZED HEALTH CARE EDUCATION/TRAINING PROGRAM
Payer: MEDICARE

## 2024-01-01 ENCOUNTER — APPOINTMENT (OUTPATIENT)
Dept: CT IMAGING | Facility: HOSPITAL | Age: 69
End: 2024-01-01
Attending: EMERGENCY MEDICINE
Payer: MEDICARE

## 2024-01-01 ENCOUNTER — OFFICE VISIT (OUTPATIENT)
Dept: FAMILY MEDICINE | Facility: OTHER | Age: 69
End: 2024-01-01
Attending: STUDENT IN AN ORGANIZED HEALTH CARE EDUCATION/TRAINING PROGRAM
Payer: COMMERCIAL

## 2024-01-01 VITALS
TEMPERATURE: 98.4 F | HEART RATE: 77 BPM | DIASTOLIC BLOOD PRESSURE: 84 MMHG | OXYGEN SATURATION: 95 % | BODY MASS INDEX: 29.05 KG/M2 | WEIGHT: 180 LBS | SYSTOLIC BLOOD PRESSURE: 140 MMHG

## 2024-01-01 VITALS
HEART RATE: 74 BPM | WEIGHT: 180 LBS | SYSTOLIC BLOOD PRESSURE: 124 MMHG | TEMPERATURE: 98.2 F | BODY MASS INDEX: 29.05 KG/M2 | DIASTOLIC BLOOD PRESSURE: 86 MMHG | OXYGEN SATURATION: 94 %

## 2024-01-01 VITALS
WEIGHT: 180 LBS | BODY MASS INDEX: 28.93 KG/M2 | OXYGEN SATURATION: 96 % | DIASTOLIC BLOOD PRESSURE: 86 MMHG | HEART RATE: 67 BPM | TEMPERATURE: 97.7 F | SYSTOLIC BLOOD PRESSURE: 130 MMHG | HEIGHT: 66 IN

## 2024-01-01 VITALS
SYSTOLIC BLOOD PRESSURE: 149 MMHG | DIASTOLIC BLOOD PRESSURE: 72 MMHG | TEMPERATURE: 97.6 F | RESPIRATION RATE: 16 BRPM | OXYGEN SATURATION: 92 % | HEART RATE: 107 BPM

## 2024-01-01 DIAGNOSIS — T50.905A ADVERSE EFFECT OF DRUG, INITIAL ENCOUNTER: ICD-10-CM

## 2024-01-01 DIAGNOSIS — T78.40XA ALLERGIC REACTION, INITIAL ENCOUNTER: ICD-10-CM

## 2024-01-01 DIAGNOSIS — G47.00 INSOMNIA, UNSPECIFIED TYPE: ICD-10-CM

## 2024-01-01 DIAGNOSIS — G25.81 RESTLESS LEGS SYNDROME: ICD-10-CM

## 2024-01-01 DIAGNOSIS — R53.83 OTHER FATIGUE: ICD-10-CM

## 2024-01-01 DIAGNOSIS — E78.5 HYPERLIPIDEMIA LDL GOAL <130: ICD-10-CM

## 2024-01-01 DIAGNOSIS — Z00.00 ROUTINE HISTORY AND PHYSICAL EXAMINATION OF ADULT: Primary | ICD-10-CM

## 2024-01-01 DIAGNOSIS — M81.0 AGE-RELATED OSTEOPOROSIS WITHOUT CURRENT PATHOLOGICAL FRACTURE: ICD-10-CM

## 2024-01-01 DIAGNOSIS — M77.11 LATERAL EPICONDYLITIS OF RIGHT ELBOW: Primary | ICD-10-CM

## 2024-01-01 DIAGNOSIS — G25.81 RESTLESS LEG SYNDROME: ICD-10-CM

## 2024-01-01 DIAGNOSIS — J01.90 ACUTE NON-RECURRENT SINUSITIS, UNSPECIFIED LOCATION: Primary | ICD-10-CM

## 2024-01-01 DIAGNOSIS — I69.398 GAIT DISTURBANCE, POST-STROKE: ICD-10-CM

## 2024-01-01 DIAGNOSIS — G25.81 RESTLESS LEG SYNDROME: Primary | ICD-10-CM

## 2024-01-01 DIAGNOSIS — E86.0 DEHYDRATION: ICD-10-CM

## 2024-01-01 DIAGNOSIS — M81.0 AGE RELATED OSTEOPOROSIS, UNSPECIFIED PATHOLOGICAL FRACTURE PRESENCE: ICD-10-CM

## 2024-01-01 DIAGNOSIS — R26.9 GAIT DISTURBANCE, POST-STROKE: ICD-10-CM

## 2024-01-01 DIAGNOSIS — Z00.00 ROUTINE HISTORY AND PHYSICAL EXAMINATION OF ADULT: ICD-10-CM

## 2024-01-01 LAB
ALBUMIN SERPL BCG-MCNC: 4.3 G/DL (ref 3.5–5.2)
ALBUMIN SERPL BCG-MCNC: 4.5 G/DL (ref 3.5–5.2)
ALBUMIN UR-MCNC: NEGATIVE MG/DL
ALP SERPL-CCNC: 60 U/L (ref 40–150)
ALP SERPL-CCNC: 62 U/L (ref 40–150)
ALT SERPL W P-5'-P-CCNC: 41 U/L (ref 0–50)
ALT SERPL W P-5'-P-CCNC: 49 U/L (ref 0–50)
ANION GAP SERPL CALCULATED.3IONS-SCNC: 14 MMOL/L (ref 7–15)
ANION GAP SERPL CALCULATED.3IONS-SCNC: 8 MMOL/L (ref 7–15)
APPEARANCE UR: CLEAR
AST SERPL W P-5'-P-CCNC: 27 U/L (ref 0–45)
AST SERPL W P-5'-P-CCNC: 30 U/L (ref 0–45)
ATRIAL RATE - MUSE: 84 BPM
ATRIAL RATE - MUSE: 86 BPM
BASOPHILS # BLD AUTO: 0 10E3/UL (ref 0–0.2)
BASOPHILS # BLD AUTO: 0.1 10E3/UL (ref 0–0.2)
BASOPHILS NFR BLD AUTO: 1 %
BASOPHILS NFR BLD AUTO: 1 %
BILIRUB SERPL-MCNC: 0.4 MG/DL
BILIRUB SERPL-MCNC: 0.5 MG/DL
BILIRUB UR QL STRIP: NEGATIVE
BUN SERPL-MCNC: 20.1 MG/DL (ref 8–23)
BUN SERPL-MCNC: 23.2 MG/DL (ref 8–23)
CALCIUM SERPL-MCNC: 9.1 MG/DL (ref 8.8–10.2)
CALCIUM SERPL-MCNC: 9.2 MG/DL (ref 8.8–10.4)
CHLORIDE SERPL-SCNC: 104 MMOL/L (ref 98–107)
CHLORIDE SERPL-SCNC: 105 MMOL/L (ref 98–107)
CHOLEST SERPL-MCNC: 176 MG/DL
COLOR UR AUTO: NORMAL
CREAT SERPL-MCNC: 0.83 MG/DL (ref 0.51–0.95)
CREAT SERPL-MCNC: 0.89 MG/DL (ref 0.51–0.95)
CRP SERPL-MCNC: <3 MG/L
DEPRECATED HCO3 PLAS-SCNC: 27 MMOL/L (ref 22–29)
DIASTOLIC BLOOD PRESSURE - MUSE: NORMAL MMHG
DIASTOLIC BLOOD PRESSURE - MUSE: NORMAL MMHG
EGFRCR SERPLBLD CKD-EPI 2021: 69 ML/MIN/1.73M2
EGFRCR SERPLBLD CKD-EPI 2021: 75 ML/MIN/1.73M2
EOSINOPHIL # BLD AUTO: 0.1 10E3/UL (ref 0–0.7)
EOSINOPHIL # BLD AUTO: 0.2 10E3/UL (ref 0–0.7)
EOSINOPHIL NFR BLD AUTO: 3 %
EOSINOPHIL NFR BLD AUTO: 3 %
ERYTHROCYTE [DISTWIDTH] IN BLOOD BY AUTOMATED COUNT: 14.3 % (ref 10–15)
ERYTHROCYTE [DISTWIDTH] IN BLOOD BY AUTOMATED COUNT: 14.3 % (ref 10–15)
FASTING STATUS PATIENT QL REPORTED: YES
GLUCOSE SERPL-MCNC: 129 MG/DL (ref 70–99)
GLUCOSE SERPL-MCNC: 97 MG/DL (ref 70–99)
GLUCOSE UR STRIP-MCNC: NEGATIVE MG/DL
HCO3 SERPL-SCNC: 20 MMOL/L (ref 22–29)
HCT VFR BLD AUTO: 43.8 % (ref 35–47)
HCT VFR BLD AUTO: 45.6 % (ref 35–47)
HDLC SERPL-MCNC: 49 MG/DL
HGB BLD-MCNC: 14 G/DL (ref 11.7–15.7)
HGB BLD-MCNC: 15.1 G/DL (ref 11.7–15.7)
HGB UR QL STRIP: NEGATIVE
HOLD SPECIMEN: NORMAL
IMM GRANULOCYTES # BLD: 0 10E3/UL
IMM GRANULOCYTES # BLD: 0 10E3/UL
IMM GRANULOCYTES NFR BLD: 1 %
IMM GRANULOCYTES NFR BLD: 1 %
INTERPRETATION ECG - MUSE: NORMAL
INTERPRETATION ECG - MUSE: NORMAL
KETONES UR STRIP-MCNC: NEGATIVE MG/DL
LDLC SERPL CALC-MCNC: 101 MG/DL
LEUKOCYTE ESTERASE UR QL STRIP: NEGATIVE
LYMPHOCYTES # BLD AUTO: 1.4 10E3/UL (ref 0.8–5.3)
LYMPHOCYTES # BLD AUTO: 1.9 10E3/UL (ref 0.8–5.3)
LYMPHOCYTES NFR BLD AUTO: 23 %
LYMPHOCYTES NFR BLD AUTO: 34 %
MAGNESIUM SERPL-MCNC: 2 MG/DL (ref 1.7–2.3)
MCH RBC QN AUTO: 28.7 PG (ref 26.5–33)
MCH RBC QN AUTO: 29.5 PG (ref 26.5–33)
MCHC RBC AUTO-ENTMCNC: 32 G/DL (ref 31.5–36.5)
MCHC RBC AUTO-ENTMCNC: 33.1 G/DL (ref 31.5–36.5)
MCV RBC AUTO: 89 FL (ref 78–100)
MCV RBC AUTO: 90 FL (ref 78–100)
MONOCYTES # BLD AUTO: 0.5 10E3/UL (ref 0–1.3)
MONOCYTES # BLD AUTO: 0.5 10E3/UL (ref 0–1.3)
MONOCYTES NFR BLD AUTO: 8 %
MONOCYTES NFR BLD AUTO: 9 %
NEUTROPHILS # BLD AUTO: 2.9 10E3/UL (ref 1.6–8.3)
NEUTROPHILS # BLD AUTO: 3.9 10E3/UL (ref 1.6–8.3)
NEUTROPHILS NFR BLD AUTO: 52 %
NEUTROPHILS NFR BLD AUTO: 65 %
NITRATE UR QL: NEGATIVE
NONHDLC SERPL-MCNC: 127 MG/DL
NRBC # BLD AUTO: 0 10E3/UL
NRBC # BLD AUTO: 0 10E3/UL
NRBC BLD AUTO-RTO: 0 /100
NRBC BLD AUTO-RTO: 0 /100
P AXIS - MUSE: 46 DEGREES
P AXIS - MUSE: 49 DEGREES
PH UR STRIP: 5.5 [PH] (ref 4.7–8)
PLATELET # BLD AUTO: 182 10E3/UL (ref 150–450)
PLATELET # BLD AUTO: 185 10E3/UL (ref 150–450)
POTASSIUM SERPL-SCNC: 4.5 MMOL/L (ref 3.4–5.3)
POTASSIUM SERPL-SCNC: 4.6 MMOL/L (ref 3.4–5.3)
PR INTERVAL - MUSE: 190 MS
PR INTERVAL - MUSE: 198 MS
PROT SERPL-MCNC: 7 G/DL (ref 6.4–8.3)
PROT SERPL-MCNC: 7.3 G/DL (ref 6.4–8.3)
QRS DURATION - MUSE: 76 MS
QRS DURATION - MUSE: 90 MS
QT - MUSE: 344 MS
QT - MUSE: 364 MS
QTC - MUSE: 411 MS
QTC - MUSE: 430 MS
R AXIS - MUSE: -15 DEGREES
R AXIS - MUSE: -24 DEGREES
RBC # BLD AUTO: 4.88 10E6/UL (ref 3.8–5.2)
RBC # BLD AUTO: 5.12 10E6/UL (ref 3.8–5.2)
RBC URINE: 2 /HPF
SODIUM SERPL-SCNC: 139 MMOL/L (ref 135–145)
SODIUM SERPL-SCNC: 139 MMOL/L (ref 135–145)
SP GR UR STRIP: 1.01 (ref 1–1.03)
SQUAMOUS EPITHELIAL: 1 /HPF
SYSTOLIC BLOOD PRESSURE - MUSE: NORMAL MMHG
SYSTOLIC BLOOD PRESSURE - MUSE: NORMAL MMHG
T AXIS - MUSE: 14 DEGREES
T AXIS - MUSE: 24 DEGREES
TRIGL SERPL-MCNC: 131 MG/DL
TROPONIN T SERPL HS-MCNC: 7 NG/L
TSH SERPL DL<=0.005 MIU/L-ACNC: 1.38 UIU/ML (ref 0.3–4.2)
UROBILINOGEN UR STRIP-MCNC: NORMAL MG/DL
VENTRICULAR RATE- MUSE: 84 BPM
VENTRICULAR RATE- MUSE: 86 BPM
WBC # BLD AUTO: 5.5 10E3/UL (ref 4–11)
WBC # BLD AUTO: 6 10E3/UL (ref 4–11)
WBC URINE: <1 /HPF

## 2024-01-01 PROCEDURE — 85004 AUTOMATED DIFF WBC COUNT: CPT | Mod: ZL

## 2024-01-01 PROCEDURE — 96375 TX/PRO/DX INJ NEW DRUG ADDON: CPT

## 2024-01-01 PROCEDURE — 250N000009 HC RX 250: Performed by: EMERGENCY MEDICINE

## 2024-01-01 PROCEDURE — 250N000011 HC RX IP 250 OP 636: Performed by: EMERGENCY MEDICINE

## 2024-01-01 PROCEDURE — 99285 EMERGENCY DEPT VISIT HI MDM: CPT | Mod: 25

## 2024-01-01 PROCEDURE — 99291 CRITICAL CARE FIRST HOUR: CPT | Performed by: EMERGENCY MEDICINE

## 2024-01-01 PROCEDURE — 84484 ASSAY OF TROPONIN QUANT: CPT | Performed by: EMERGENCY MEDICINE

## 2024-01-01 PROCEDURE — G0439 PPPS, SUBSEQ VISIT: HCPCS | Performed by: STUDENT IN AN ORGANIZED HEALTH CARE EDUCATION/TRAINING PROGRAM

## 2024-01-01 PROCEDURE — 70450 CT HEAD/BRAIN W/O DYE: CPT | Mod: MG

## 2024-01-01 PROCEDURE — 99213 OFFICE O/P EST LOW 20 MIN: CPT | Performed by: STUDENT IN AN ORGANIZED HEALTH CARE EDUCATION/TRAINING PROGRAM

## 2024-01-01 PROCEDURE — G0463 HOSPITAL OUTPT CLINIC VISIT: HCPCS

## 2024-01-01 PROCEDURE — 250N000013 HC RX MED GY IP 250 OP 250 PS 637: Performed by: EMERGENCY MEDICINE

## 2024-01-01 PROCEDURE — 80061 LIPID PANEL: CPT | Mod: ZL

## 2024-01-01 PROCEDURE — 81001 URINALYSIS AUTO W/SCOPE: CPT | Performed by: EMERGENCY MEDICINE

## 2024-01-01 PROCEDURE — 70496 CT ANGIOGRAPHY HEAD: CPT | Mod: MG

## 2024-01-01 PROCEDURE — 86140 C-REACTIVE PROTEIN: CPT | Performed by: EMERGENCY MEDICINE

## 2024-01-01 PROCEDURE — 93010 ELECTROCARDIOGRAM REPORT: CPT | Mod: 76 | Performed by: INTERNAL MEDICINE

## 2024-01-01 PROCEDURE — 83735 ASSAY OF MAGNESIUM: CPT | Performed by: EMERGENCY MEDICINE

## 2024-01-01 PROCEDURE — 80053 COMPREHEN METABOLIC PANEL: CPT | Performed by: EMERGENCY MEDICINE

## 2024-01-01 PROCEDURE — 84443 ASSAY THYROID STIM HORMONE: CPT | Mod: ZL

## 2024-01-01 PROCEDURE — 96361 HYDRATE IV INFUSION ADD-ON: CPT

## 2024-01-01 PROCEDURE — 94640 AIRWAY INHALATION TREATMENT: CPT

## 2024-01-01 PROCEDURE — 36415 COLL VENOUS BLD VENIPUNCTURE: CPT | Performed by: EMERGENCY MEDICINE

## 2024-01-01 PROCEDURE — 93005 ELECTROCARDIOGRAM TRACING: CPT | Mod: 76

## 2024-01-01 PROCEDURE — 80053 COMPREHEN METABOLIC PANEL: CPT | Mod: ZL

## 2024-01-01 PROCEDURE — 96374 THER/PROPH/DIAG INJ IV PUSH: CPT

## 2024-01-01 PROCEDURE — 99213 OFFICE O/P EST LOW 20 MIN: CPT | Performed by: NURSE PRACTITIONER

## 2024-01-01 PROCEDURE — 36415 COLL VENOUS BLD VENIPUNCTURE: CPT | Mod: ZL

## 2024-01-01 PROCEDURE — 85025 COMPLETE CBC W/AUTO DIFF WBC: CPT | Performed by: EMERGENCY MEDICINE

## 2024-01-01 PROCEDURE — 96376 TX/PRO/DX INJ SAME DRUG ADON: CPT | Mod: XU

## 2024-01-01 PROCEDURE — 77080 DXA BONE DENSITY AXIAL: CPT

## 2024-01-01 PROCEDURE — 258N000003 HC RX IP 258 OP 636: Performed by: EMERGENCY MEDICINE

## 2024-01-01 PROCEDURE — 71045 X-RAY EXAM CHEST 1 VIEW: CPT

## 2024-01-01 RX ORDER — GABAPENTIN 300 MG/1
300 CAPSULE ORAL
Qty: 90 CAPSULE | Refills: 0 | Status: SHIPPED | OUTPATIENT
Start: 2024-01-01

## 2024-01-01 RX ORDER — DIPHENHYDRAMINE HYDROCHLORIDE 50 MG/ML
25 INJECTION INTRAMUSCULAR; INTRAVENOUS ONCE
Status: COMPLETED | OUTPATIENT
Start: 2024-01-01 | End: 2024-01-01

## 2024-01-01 RX ORDER — IPRATROPIUM BROMIDE AND ALBUTEROL SULFATE 2.5; .5 MG/3ML; MG/3ML
3 SOLUTION RESPIRATORY (INHALATION) ONCE
Status: COMPLETED | OUTPATIENT
Start: 2024-01-01 | End: 2024-01-01

## 2024-01-01 RX ORDER — IPRATROPIUM BROMIDE AND ALBUTEROL SULFATE 2.5; .5 MG/3ML; MG/3ML
SOLUTION RESPIRATORY (INHALATION)
Status: COMPLETED
Start: 2024-01-01 | End: 2024-01-01

## 2024-01-01 RX ORDER — FAMOTIDINE 40 MG/5ML
40 POWDER, FOR SUSPENSION ORAL 2 TIMES DAILY
Status: DISCONTINUED | OUTPATIENT
Start: 2024-01-01 | End: 2024-01-01 | Stop reason: HOSPADM

## 2024-01-01 RX ORDER — MAGNESIUM HYDROXIDE/ALUMINUM HYDROXICE/SIMETHICONE 120; 1200; 1200 MG/30ML; MG/30ML; MG/30ML
15 SUSPENSION ORAL ONCE
Status: COMPLETED | OUTPATIENT
Start: 2024-01-01 | End: 2024-01-01

## 2024-01-01 RX ORDER — ROSUVASTATIN CALCIUM 5 MG/1
TABLET, COATED ORAL
Qty: 90 TABLET | Refills: 2 | Status: SHIPPED | OUTPATIENT
Start: 2024-01-01

## 2024-01-01 RX ORDER — LORAZEPAM 2 MG/ML
0.5 INJECTION INTRAMUSCULAR ONCE
Status: COMPLETED | OUTPATIENT
Start: 2024-01-01 | End: 2024-01-01

## 2024-01-01 RX ORDER — METHYLPREDNISOLONE SODIUM SUCCINATE 125 MG/2ML
125 INJECTION, POWDER, LYOPHILIZED, FOR SOLUTION INTRAMUSCULAR; INTRAVENOUS ONCE
Status: COMPLETED | OUTPATIENT
Start: 2024-01-01 | End: 2024-01-01

## 2024-01-01 RX ORDER — SODIUM CHLORIDE 9 MG/ML
INJECTION, SOLUTION INTRAVENOUS CONTINUOUS
Status: DISCONTINUED | OUTPATIENT
Start: 2024-01-01 | End: 2024-01-01 | Stop reason: HOSPADM

## 2024-01-01 RX ORDER — PRAMIPEXOLE DIHYDROCHLORIDE 0.5 MG/1
TABLET ORAL
Qty: 90 TABLET | Refills: 3 | Status: SHIPPED | OUTPATIENT
Start: 2024-01-01

## 2024-01-01 RX ORDER — IOPAMIDOL 755 MG/ML
117 INJECTION, SOLUTION INTRAVASCULAR ONCE
Status: COMPLETED | OUTPATIENT
Start: 2024-01-01 | End: 2024-01-01

## 2024-01-01 RX ORDER — ALBUTEROL SULFATE 0.83 MG/ML
2.5 SOLUTION RESPIRATORY (INHALATION) ONCE
Status: COMPLETED | OUTPATIENT
Start: 2024-01-01 | End: 2024-01-01

## 2024-01-01 RX ORDER — GABAPENTIN 300 MG/1
300 CAPSULE ORAL
Qty: 90 CAPSULE | Refills: 0 | Status: SHIPPED | OUTPATIENT
Start: 2024-01-01 | End: 2024-01-01

## 2024-01-01 RX ORDER — ROSUVASTATIN CALCIUM 5 MG/1
TABLET, COATED ORAL
Qty: 90 TABLET | Refills: 0 | Status: SHIPPED | OUTPATIENT
Start: 2024-01-01 | End: 2024-01-01

## 2024-01-01 RX ADMIN — FAMOTIDINE 40 MG: 40 POWDER, FOR SUSPENSION ORAL at 10:02

## 2024-01-01 RX ADMIN — PANTOPRAZOLE SODIUM 40 MG: 40 INJECTION, POWDER, FOR SOLUTION INTRAVENOUS at 18:28

## 2024-01-01 RX ADMIN — SODIUM CHLORIDE: 9 INJECTION, SOLUTION INTRAVENOUS at 11:58

## 2024-01-01 RX ADMIN — DIPHENHYDRAMINE HYDROCHLORIDE 25 MG: 50 INJECTION, SOLUTION INTRAMUSCULAR; INTRAVENOUS at 09:38

## 2024-01-01 RX ADMIN — ALUMINUM HYDROXIDE, MAGNESIUM HYDROXIDE, AND SIMETHICONE 15 ML: 1200; 120; 1200 SUSPENSION ORAL at 18:29

## 2024-01-01 RX ADMIN — LORAZEPAM 0.5 MG: 2 INJECTION INTRAMUSCULAR; INTRAVENOUS at 19:49

## 2024-01-01 RX ADMIN — DIPHENHYDRAMINE HYDROCHLORIDE 25 MG: 50 INJECTION, SOLUTION INTRAMUSCULAR; INTRAVENOUS at 19:47

## 2024-01-01 RX ADMIN — SODIUM CHLORIDE 500 ML: 9 INJECTION, SOLUTION INTRAVENOUS at 11:59

## 2024-01-01 RX ADMIN — METHYLPREDNISOLONE SODIUM SUCCINATE 125 MG: 125 INJECTION, POWDER, FOR SOLUTION INTRAMUSCULAR; INTRAVENOUS at 09:38

## 2024-01-01 RX ADMIN — IPRATROPIUM BROMIDE AND ALBUTEROL SULFATE 3 ML: .5; 3 SOLUTION RESPIRATORY (INHALATION) at 09:37

## 2024-01-01 RX ADMIN — IOPAMIDOL 67 ML: 755 INJECTION, SOLUTION INTRAVENOUS at 09:21

## 2024-01-01 RX ADMIN — IPRATROPIUM BROMIDE AND ALBUTEROL SULFATE 3 ML: 2.5; .5 SOLUTION RESPIRATORY (INHALATION) at 09:37

## 2024-01-01 RX ADMIN — SODIUM CHLORIDE: 9 INJECTION, SOLUTION INTRAVENOUS at 16:39

## 2024-01-01 RX ADMIN — ALBUTEROL SULFATE 2.5 MG: 2.5 SOLUTION RESPIRATORY (INHALATION) at 18:16

## 2024-01-01 ASSESSMENT — ENCOUNTER SYMPTOMS
MYALGIAS: 0
NERVOUS/ANXIOUS: 0
CONSTIPATION: 0
HEMATOCHEZIA: 0
EYE PAIN: 0
NAUSEA: 0
PALPITATIONS: 0
HEARTBURN: 0
PARESTHESIAS: 0
HEMATURIA: 0
ARTHRALGIAS: 0
HEADACHES: 0
CHILLS: 0
SHORTNESS OF BREATH: 0
COUGH: 0
ABDOMINAL PAIN: 0
SORE THROAT: 0
WEAKNESS: 0
JOINT SWELLING: 0
FEVER: 0
DIZZINESS: 0
DIARRHEA: 0
BREAST MASS: 0
FREQUENCY: 0
DYSURIA: 0

## 2024-01-01 ASSESSMENT — PAIN SCALES - GENERAL
PAINLEVEL: NO PAIN (0)
PAINLEVEL: NO PAIN (0)
PAINLEVEL: MODERATE PAIN (5)

## 2024-01-01 ASSESSMENT — ACTIVITIES OF DAILY LIVING (ADL)
ADLS_ACUITY_SCORE: 35
CURRENT_FUNCTION: NO ASSISTANCE NEEDED
ADLS_ACUITY_SCORE: 35

## 2024-01-08 NOTE — PROGRESS NOTES
"SUBJECTIVE:   Diana is a 68 year old, presenting for the following:  Annual Visit, Hypertension, and Lipids        1/8/2024     9:20 AM   Additional Questions   Roomed by Alisa Thomas   Accompanied by None         1/8/2024     9:20 AM   Patient Reported Additional Medications   Patient reports taking the following new medications None     Are you in the first 12 months of your Medicare coverage?  No    Healthy Habits:     In general, how would you rate your overall health?  Good    Frequency of exercise:  4-5 days/week    Duration of exercise:  15-30 minutes    Do you usually eat at least 4 servings of fruit and vegetables a day, include whole grains    & fiber and avoid regularly eating high fat or \"junk\" foods?  Yes    Taking medications regularly:  Yes    Medication side effects:  None    Ability to successfully perform activities of daily living:  No assistance needed    Home Safety:  No safety concerns identified    Hearing Impairment:  No hearing concerns    In the past 6 months, have you been bothered by leaking of urine? Yes    In general, how would you rate your overall mental or emotional health?  Good    Additional concerns today:  Yes    Urinary leakage- leans forward to help her   Has urgency to go to the bathroom- leaks the most when she   Cutting back on fluids as a result.    Using pantiliners.    Used to do [employer]eg[employer]-   Drinks 1-2 cup sof coffee per day.  Last night had lemonade. Was up 4x overnight.  If drinking liquids, she is up to go to the bathroom. Is an issue both daytime and night time.  When shopping   Pramipexole and gabapentin not helping.  Only getting a few hours.  Lacking energy and interest    Fine with sitting upright but trouble laying down.  Willing to try tramadol.      Hyperlipidemia Follow-Up    Are you regularly taking any medication or supplement to lower your cholesterol?   Yes- Crestor   Are you having muscle aches or other side effects that you think could be caused by your " cholesterol lowering medication?  No    Hypertension Follow-up    Do you check your blood pressure regularly outside of the clinic? No   Are you following a low salt diet? Yes  Are your blood pressures ever more than 140 on the top number (systolic) OR more   than 90 on the bottom number (diastolic), for example 140/90? Unknown patient is not checking blood pressure outside of the clinic     Have you ever done Advance Care Planning? (For example, a Health Directive, POLST, or a discussion with a medical provider or your loved ones about your wishes): Yes, advance care planning is on file.    Fall risk  Fallen 2 or more times in the past year?: No  Any fall with injury in the past year?: No    Cognitive Screening   1) Repeat 3 items (Leader, Season, Table)    2) Clock draw: NORMAL  3) 3 item recall: Recalls 2 objects   Results: NORMAL clock, 1-2 items recalled: COGNITIVE IMPAIRMENT LESS LIKELY      Reviewed and updated as needed this visit by clinical staff   Tobacco  Allergies  Meds              Reviewed and updated as needed this visit by Provider                 Social History     Tobacco Use    Smoking status: Never    Smokeless tobacco: Never   Substance Use Topics    Alcohol use: Yes     Comment: Socially             1/8/2024     9:18 AM   Alcohol Use   Prescreen: >3 drinks/day or >7 drinks/week? No     Do you have a current opioid prescription? No  Do you use any other controlled substances or medications that are not prescribed by a provider? None      Current providers sharing in care for this patient include:   Patient Care Team:  Juany Mcmahon MD as PCP - General (Family Medicine)  Juany Mcmahon MD as Assigned PCP    The following health maintenance items are reviewed in Epic and correct as of today:  Health Maintenance   Topic Date Due    COVID-19 Vaccine (1) Never done    RSV VACCINE (Pregnancy & 60+) (1 - 1-dose 60+ series) Never done    DTAP/TDAP/TD IMMUNIZATION (4 - Td or Tdap) 01/23/2022     MEDICARE ANNUAL WELLNESS VISIT  10/19/2023    LIPID  11/11/2023    COLORECTAL CANCER SCREENING  10/23/2024    MAMMO SCREENING  12/15/2024    FALL RISK ASSESSMENT  01/08/2025    DEXA  01/04/2026    ADVANCE CARE PLANNING  11/17/2028    PHQ-2 (once per calendar year)  Completed    INFLUENZA VACCINE  Completed    ZOSTER IMMUNIZATION  Completed    HEPATITIS C SCREENING  Addressed    Pneumococcal Vaccine: 65+ Years  Addressed    IPV IMMUNIZATION  Aged Out    HPV IMMUNIZATION  Aged Out    MENINGITIS IMMUNIZATION  Aged Out    RSV MONOCLONAL ANTIBODY  Aged Out       Review of Systems   Constitutional:  Negative for chills and fever.   HENT:  Negative for congestion, ear pain, hearing loss and sore throat.    Eyes:  Negative for pain and visual disturbance.   Respiratory:  Negative for cough and shortness of breath.    Cardiovascular:  Negative for chest pain, palpitations and peripheral edema.   Gastrointestinal:  Negative for abdominal pain, constipation, diarrhea, heartburn, hematochezia and nausea.   Breasts:  Negative for tenderness, breast mass and discharge.   Genitourinary:  Negative for dysuria, frequency, genital sores, hematuria, pelvic pain, urgency, vaginal bleeding and vaginal discharge.   Musculoskeletal:  Negative for arthralgias, joint swelling and myalgias.   Skin:  Negative for rash.   Neurological:  Negative for dizziness, weakness, headaches and paresthesias.   Psychiatric/Behavioral:  Negative for mood changes. The patient is not nervous/anxious.      Constitutional, HEENT, cardiovascular, pulmonary, GI, , musculoskeletal, neuro, skin, endocrine and psych systems are negative, except as otherwise noted.    OBJECTIVE:   BP (!) 140/84 (BP Location: Left arm, Patient Position: Sitting, Cuff Size: Adult Regular)   Pulse 77   Temp 98.4  F (36.9  C) (Tympanic)   Wt 81.6 kg (180 lb)   SpO2 95%   BMI 29.05 kg/m   Estimated body mass index is 29.05 kg/m  as calculated from the following:    Height as of  "11/11/22: 1.676 m (5' 6\").    Weight as of this encounter: 81.6 kg (180 lb).  Physical Exam  GENERAL: healthy, alert and no distress  EYES: Eyes grossly normal to inspection, PERRL and conjunctivae and sclerae normal  HENT: ear canals and TM's normal, nose and mouth without ulcers or lesions  NECK: no adenopathy, no asymmetry, masses, or scars and thyroid normal to palpation  RESP: lungs clear to auscultation - no rales, rhonchi or wheezes  BREAST: normal without masses, tenderness or nipple discharge and no palpable axillary masses or adenopathy  CV: regular rate and rhythm, normal S1 S2, no S3 or S4, no murmur, click or rub, no peripheral edema and peripheral pulses strong  ABDOMEN: soft, nontender, no hepatosplenomegaly, no masses and bowel sounds normal  MS: no gross musculoskeletal defects noted, no edema  SKIN: no suspicious lesions or rashes  NEURO: Normal strength and tone, mentation intact and speech normal  PSYCH: mentation appears normal, affect normal/bright      ASSESSMENT / PLAN:       ICD-10-CM    1. Routine history and physical examination of adult  Z00.00 Comprehensive metabolic panel     TSH with free T4 reflex     CBC with platelets and differential     Lipid Profile (Chol, Trig, HDL, LDL calc)      2. Other fatigue  R53.83       3. Age-related osteoporosis without current pathological fracture  M81.0       4. Insomnia, unspecified type  G47.00       5. Restless legs syndrome  G25.81         Ms. Diana Restrepo is here for annual physical.  She is doing well overall. She has osteoporosis which we are treating with fosamax.  Since starting medication around 12/6/2022 her BMD has improved significantly by 51.5%.  This marks a significant improvement clinically.  Will plan to treat for 3-5 years then consider drug holiday.  Patient agreeable with this. She is tolerating her medication well.  She still has restless legs. She is agreeable to continue with neurontin for now.  She is not anemic but we " could consider checking iron stores.  They were last checked in 2019 and ferritin was 69 which is actually quite good.  Willing to consider low dose opioid (tramadol) if no significant improvement with gabapentin and ferritin is normal on recheck (75 or higher). I think her sleep deprivation/insomnia is contributing to her symptoms.      COUNSELING:  Reviewed preventive health counseling, as reflected in patient instructions       Regular exercise       Healthy diet/nutrition       Bladder control       Fall risk prevention       Osteoporosis prevention/bone health       Colon cancer screening        She reports that she has never smoked. She has never used smokeless tobacco.      Appropriate preventive services were discussed with this patient, including applicable screening as appropriate for fall prevention, nutrition, physical activity, Tobacco-use cessation, weight loss and cognition.  Checklist reviewing preventive services available has been given to the patient.    Reviewed patients plan of care and provided an AVS. The Basic Care Plan (routine screening as documented in Health Maintenance) for Diana meets the Care Plan requirement. This Care Plan has been established and reviewed with the Patient.          Juany Mcmahon MD  [facility] [place]    Identified Health Risks:  I have reviewed Opioid Use Disorder and Substance Use Disorder risk factors and made any needed referrals.

## 2024-02-01 NOTE — TELEPHONE ENCOUNTER
Gabapentin      Last Written Prescription Date:  11/28/23  Last Fill Quantity: 90,   # refills: 0  Last Office Visit: 1/8/24  Future Office visit:       Routing refill request to provider for review/approval because:

## 2024-02-08 NOTE — TELEPHONE ENCOUNTER
3:24 PM    Reason for Call: Phone Call    Description: pt would like to speak to a nurse about her Dexa scan, sown no signs of osteoporosis and pt is wondering if she should continue that medication.     Was an appointment offered for this call? No  If yes : Appointment type              Date    Preferred method for responding to this message: Telephone Call  What is your phone number ? [phone]    If we cannot reach you directly, may we leave a detailed response at the number you provided? Yes    Can this message wait until your PCP/provider returns, if available today? Not applicable    Jennifer Saldaña

## 2024-02-09 NOTE — TELEPHONE ENCOUNTER
I recommend continuing for now.  How long total that she has been taking it?  I would recommend we continue for now.  Will discuss drug holiday at next visit.  Generally we consider drug holiday for patients who have persisted with therapy for 3-5 years and for those at low risk of fracture.  If she has any dental work planned we would hold therapy for 2 months or so.

## 2024-02-14 NOTE — TELEPHONE ENCOUNTER
Patient had bone density scan on 1/4/24.  Patient wanting to know if she is to continue taking Fosamax.  Please advise, thank you.

## 2024-02-14 NOTE — TELEPHONE ENCOUNTER
10:21 AM    Reason for Call: Phone Call    Description: Patient had her bone density back in January and she has questions on whether if she should be taking her Alendronate medication    Was an appointment offered for this call? No  If yes : Appointment type              Date    Preferred method for responding to this message: Telephone Call  What is your phone number ? [phone]     If we cannot reach you directly, may we leave a detailed response at the number you provided? Yes    Can this message wait until your PCP/provider returns, if available today? YES, No

## 2024-02-15 NOTE — TELEPHONE ENCOUNTER
Eulogio Romo, I do recommend we have her continue taking the medication for now. We will revisit this on an annual basis

## 2024-02-19 NOTE — TELEPHONE ENCOUNTER
LM for patient notifying to continue taking Rx per PCP recommendation.   Advised to call back for further questions or concerns.

## 2024-03-25 NOTE — TELEPHONE ENCOUNTER
Gabapentin      Last Written Prescription Date:  2/2/24  Last Fill Quantity: 90,   # refills: 0  Last Office Visit: 1/8/24  Future Office visit:       Routing refill request to provider for review/approval because:

## 2024-04-01 NOTE — TELEPHONE ENCOUNTER
Care Team 2   /  Dr. Juany Mcmahon    Patient requesting a referral for physical therapy for her RIGHT arm from past injury.  Patient requesting [place] area for treatment.  Phone is [phone]     Thank you

## 2024-04-08 NOTE — TELEPHONE ENCOUNTER
Crestor      Last Written Prescription Date:  11/28/23  Last Fill Quantity: 90,   # refills: 0  Last Office Visit: 1/8/24  Future Office visit:    Next 5 appointments (look out 90 days)      Apr 25, 2024 11:00 AM  (Arrive by 10:45 AM)  Provider Visit with Juany Mcmahon MD  [facility] ([facility] ) [address]  [place] [address]  [phone]             Routing refill request to provider for review/approval because:

## 2024-04-10 PROBLEM — H04.222 EPIPHORA DUE TO INSUFFICIENT DRAINAGE OF LEFT SIDE: Status: ACTIVE | Noted: 2024-01-01

## 2024-04-10 PROBLEM — H01.001 BLEPHARITIS OF UPPER EYELIDS OF BOTH EYES, UNSPECIFIED TYPE: Status: ACTIVE | Noted: 2024-01-01

## 2024-04-10 PROBLEM — Z87.09 HISTORY OF SINUSITIS: Status: ACTIVE | Noted: 2024-01-01

## 2024-04-10 PROBLEM — H40.042 STEROID RESPONDER, LEFT EYE: Status: ACTIVE | Noted: 2024-01-01

## 2024-04-10 PROBLEM — H01.004 BLEPHARITIS OF UPPER EYELIDS OF BOTH EYES, UNSPECIFIED TYPE: Status: ACTIVE | Noted: 2024-01-01

## 2024-04-10 NOTE — PROGRESS NOTES
"  Assessment & Plan     Lateral epicondylitis of right elbow  Pin point elbow pain and having weakness.  Will treat for epicondylitis   Encourage use of tennis elbow brace and pain cream massaged into area  Follow up if no improvement.  No imaging completed due to no trauma   - diclofenac (VOLTAREN) 1 % topical gel; Apply 4 g topically 4 times daily    Consider PT if no improvement     BMI  Estimated body mass index is 29.05 kg/m  as calculated from the following:    Height as of this encounter: 1.676 m (5' 6\").    Weight as of this encounter: 81.6 kg (180 lb).         See Patient Instructions    No follow-ups on file.    Subjective   Diana is a 68 year old, presenting for the following health issues:  Arm Pain        1/8/2024     9:20 AM   Additional Questions   Roomed by Alisa Thomas   Accompanied by None     History of Present Illness       Reason for visit:  Pain in rt arm  Symptom onset:  3-4 weeks ago  Symptoms include:  Right elbow pain  Symptom intensity:  Moderate  Symptom progression:  Staying the same  Had these symptoms before:  Yes  Has tried/received treatment for these symptoms:  No  What makes it worse:  Using arm too much  What makes it better:  Advil and ice    She eats 2-3 servings of fruits and vegetables daily.She consumes 1 sweetened beverage(s) daily.She exercises with enough effort to increase her heart rate 30 to 60 minutes per day.  She exercises with enough effort to increase her heart rate 3 or less days per week. She is missing 1 dose(s) of medications per week.  She is not taking prescribed medications regularly due to remembering to take.     -Is wanting a referral to PT  Pain History:  When did you first notice your pain?3-4 weeks ago    Have you seen anyone else for your pain? No  How has your pain affected your ability to work? Not applicable  Where in your body do you have pain? Musculoskeletal problem/pain  Onset/Duration: 3-4 weeks   Description  Location: elbow - right  Joint " "Swelling: No  Redness: No  Pain: YES  Warmth: No  Intensity:  mild  Progression of Symptoms:  same  Accompanying signs and symptoms:   Fevers: No  Numbness/tingling/weakness: YES- sometimes in her fingers   History  Trauma to the area: knitts and she thinks she has tennis elbow   Recent illness:  No  Previous similar problem: No  Previous evaluation:  No  Precipitating or alleviating factors:  Aggravating factors include: overuse  Therapies tried and outcome: nothing and Ibuprofen        Review of Systems  CONSTITUTIONAL: NEGATIVE for fever, chills, change in weight  INTEGUMENTARY/SKIN: NEGATIVE for worrisome rashes, moles or lesions  RESP: NEGATIVE for significant cough or SOB  CV: NEGATIVE for chest pain, palpitations or peripheral edema  MUSCULOSKELETAL: right outer elbow area   NEURO: weakness into the right lower arm       Objective    /86 (BP Location: Right arm, Patient Position: Sitting, Cuff Size: Adult Regular)   Pulse 67   Temp 97.7  F (36.5  C) (Tympanic)   Ht 1.676 m (5' 6\")   Wt 81.6 kg (180 lb)   SpO2 96%   BMI 29.05 kg/m    Body mass index is 29.05 kg/m .  Physical Exam   GENERAL: alert and no distress  RESP: lungs clear to auscultation - no rales, rhonchi or wheezes  CV: regular rate and rhythm, normal S1 S2, no S3 or S4, no murmur, click or rub, no peripheral edema  ABDOMEN: soft, nontender, no hepatosplenomegaly, no masses and bowel sounds normal  MS: tenderness to palpation right lateral elbow pain tender to touch   SKIN: no suspicious lesions or rashes  NEURO: weak hand grasp on the right     No imaging completed today         Signed Electronically by: ANGELICA Silva CNP    "

## 2024-05-06 NOTE — TELEPHONE ENCOUNTER
Mirapex       Last Written Prescription Date:  11/28/2023  Last Fill Quantity: 90,   # refills: 3  Last Office Visit: 4/10/2024  Future Office visit:

## 2024-05-17 NOTE — TELEPHONE ENCOUNTER
8:14 AM    Reason for Call: OVERBOOK    Patient is having the following symptoms: Patient needs to be seen for possible sinus infection. days.    The patient is requesting an appointment for [employer] with .    Was an appointment offered for this call? No  If yes : Appointment type              Date    Preferred method for responding to this message: Telephone Call  What is your phone number ?   [phone]    If we cannot reach you directly, may we leave a detailed response at the number you provided? Yes    Can this message wait until your PCP/provider returns, if unavailable today? Provider is in today    [place]

## 2024-05-17 NOTE — PROGRESS NOTES
"  Assessment & Plan     Acute non-recurrent sinusitis, unspecified location  Most likely viral in etiology.  No alarm s/s  Continue with supportive cares  No antibiotics warranted at this time  Return if symptoms worsening or fail to improve.      BMI  Estimated body mass index is 29.05 kg/m  as calculated from the following:    Height as of 4/10/24: 1.676 m (5' 6\").    Weight as of this encounter: 81.6 kg (180 lb).         No follow-ups on file.    Tiffany Ortiz is a 68 year old, presenting for the following health issues:  Sinus Problem        5/17/2024     1:13 PM   Additional Questions   Roomed by Demar Lee   Accompanied by Self         5/17/2024     1:13 PM   Patient Reported Additional Medications   Patient reports taking the following new medications none     Sinus Problem     History of Present Illness       Reason for visit:  Possible sinus infection  Symptom onset:  3-7 days ago    She eats 2-3 servings of fruits and vegetables daily.She consumes 0 sweetened beverage(s) daily.She exercises with enough effort to increase her heart rate 30 to 60 minutes per day.  She exercises with enough effort to increase her heart rate 3 or less days per week.   She is taking medications regularly.       Acute Illness  Acute illness concerns: Possible sinus infection  Onset/Duration: 3-7 days  Symptoms:  Fever: No  Chills/Sweats: No  Headache (location?): YES- frontal   Sinus Pressure: YES  Conjunctivitis:  YES- watering   Ear Pain: YES: right  Rhinorrhea: YES  Congestion: YES  Sore Throat: YES  Cough: YES-non-productive  Wheeze: No  Decreased Appetite: YES  Nausea: No  Vomiting: No  Diarrhea: No  Dysuria/Freq.: No  Dysuria or Hematuria: No  Fatigue/Achiness: YES- fatigue  Sick/Strep Exposure: YES- was at a school this last week   Therapies tried and outcome: None        Review of Systems  Constitutional, HEENT, cardiovascular, pulmonary, GI, , musculoskeletal, neuro, skin, endocrine and psych systems are " negative, except as otherwise noted.      Objective    /86 (BP Location: Right arm, Patient Position: Sitting, Cuff Size: Adult Regular)   Pulse 74   Temp 98.2  F (36.8  C) (Tympanic)   Wt 81.6 kg (180 lb)   SpO2 94%   BMI 29.05 kg/m    Body mass index is 29.05 kg/m .  Physical Exam   GENERAL: alert and no distress  EYES: Eyes grossly normal to inspection, PERRL and conjunctivae and sclerae normal  HENT: ear canals and TM's normal, nose and mouth without ulcers or lesions  NECK: no adenopathy, no asymmetry, masses, or scars  RESP: lungs clear to auscultation - no rales, rhonchi or wheezes  CV: regular rate and rhythm, normal S1 S2, no S3 or S4, no murmur, click or rub, no peripheral edema  ABDOMEN: soft, nontender, no hepatosplenomegaly, no masses and bowel sounds normal  MS: no gross musculoskeletal defects noted, no edema  SKIN: no suspicious lesions or rashes  NEURO: Normal strength and tone, mentation intact and speech normal  PSYCH: mentation appears normal, affect normal/bright          Signed Electronically by: Juany Mcmahon MD

## 2024-07-08 NOTE — TELEPHONE ENCOUNTER
4:01 PM    Reason for Call: Phone Call    Description: Right arm pain hasn't gotten better with what was previously prescribed. Patient requesting a Physical Therapy referral. Please call her to advise.    Was an appointment offered for this call?  Patient was already seen stated that she didn't want an appointment  If yes : Appointment type              Date    Preferred method for responding to this message: Telephone Call  What is your phone number ?  [phone]    If we cannot reach you directly, may we leave a detailed response at the number you provided? Yes    Can this message wait until your PCP/provider returns, if available today? Not applicable    Etta Dolan

## 2024-07-10 NOTE — TELEPHONE ENCOUNTER
Spoke with Pt via phone  States R elbow pain is not worse but also not improving with applying ice packs & using tennis elbow brace  Asking for a referral to PT    Teed up for PCP to review

## 2024-08-10 PROBLEM — R26.81 UNSTEADY GAIT: Status: ACTIVE | Noted: 2024-01-01

## 2024-08-10 PROBLEM — T78.40XA ALLERGIC REACTION, INITIAL ENCOUNTER: Status: ACTIVE | Noted: 2024-01-01

## 2024-08-10 PROBLEM — T78.2XXA ANAPHYLACTIC REACTION: Status: ACTIVE | Noted: 2024-01-01

## 2024-08-10 PROBLEM — E86.0 DEHYDRATION: Status: ACTIVE | Noted: 2024-01-01

## 2024-08-10 PROBLEM — T50.905A ADVERSE EFFECT OF DRUG, INITIAL ENCOUNTER: Status: ACTIVE | Noted: 2024-01-01

## 2024-08-10 NOTE — ED NOTES
Patient reports symptoms improved initially but reports some heaviness in chest and burning sensation. Dr. Pagan updated and is at bedside to re evaluate patient.

## 2024-08-10 NOTE — ED TRIAGE NOTES
Burred vision, weakness, mild headache, unstable gait, weaker on the right. Symptoms started Friday Morning

## 2024-08-10 NOTE — ED NOTES
Called down to CT after patient received IV contrast and began having a tightness sensation in her throat, weak/dry cough, and SOB. Dr. Pagan at bedside to evaluate patient. Ordered neb and meds administered and patient significantly improved. Mild expiratory wheezes still auscultated but no increased work of breathing noted at this time.

## 2024-08-10 NOTE — ED PROVIDER NOTES
History     Chief Complaint   Patient presents with    Stroke Symptoms     HPI  Diana Restrepo is a 69 year old female who has a history of restless leg syndrome presents with development of symptoms of dizziness, lightheadedness not vertigo, blurred vision denies any diplopia, unsteady gait, difficulty thinking and complaining that her thoughts seem jumbled.  This started 28 to 36 hours ago.  Patient thought it was from her taking gabapentin for her restless legs.  She usually takes Mirapex which makes her sleepy at night but was desperate it was not helping and so she took 2 capsules of gabapentin during the early hours of the ninth.  She noticed her symptoms at 4:30 in the morning or sooner.  She did not sleep well at night.  She thought it might improve but it did not.  She took Mirapex last night.  Given that her symptoms have persisted she was concerned there may be something more than side effect from gabapentin.  She denies any headache difficulty swallowing no difficulties with coordination of her hands.  She thought she had difficulties speaking but she is articulate and no slurred speech.    Allergies:  Allergies   Allergen Reactions    Iodinated Contrast Media Difficulty breathing     Cough, tightness sensation in throat, dyspnea    Simvastatin Other (See Comments)     Myalgias and elevated CPK    Other Drug Allergy (See Comments) Muscle Pain (Myalgia)    Zinc Hives       Problem List:    Patient Active Problem List    Diagnosis Date Noted    Anaphylactic reaction 08/10/2024     Priority: Medium    Unsteady gait 08/10/2024     Priority: Medium    Dehydration 08/10/2024     Priority: Medium    Allergic reaction, initial encounter 08/10/2024     Priority: Medium    Adverse effect of drug, initial encounter 08/10/2024     Priority: Medium    History of sinusitis 01/18/2024     Priority: Medium    Steroid responder, left eye 01/18/2024     Priority: Medium    Blepharitis of upper eyelids of both eyes,  unspecified type 01/02/2024     Priority: Medium    Epiphora due to insufficient drainage of left side 01/02/2024     Priority: Medium    Female stress incontinence 08/15/2022     Priority: Medium    ACE-inhibitor cough 09/03/2019     Priority: Medium    Iron deficiency 09/03/2019     Priority: Medium    AK (actinic keratosis) 05/08/2019     Priority: Medium    Benign essential hypertension 03/12/2019     Priority: Medium    History of total left knee replacement 03/12/2019     Priority: Medium    Decreased mobility 02/06/2019     Priority: Medium    Left leg weakness 02/06/2019     Priority: Medium    Arthritis of knee, left 08/08/2017     Priority: Medium    Restless leg syndrome 08/08/2017     Priority: Medium    Dysplastic Pap smear 11/26/2013     Priority: Medium     Severe dysplasia on biopsy at 12:00, moderate dysplasia from biopsy at 6:00, at colposcopy, conization at [facility], May 22, 2013 done, revealed no dysplasia.      Injury of right lateral plantar nerve 09/25/2012     Priority: Medium    Plantar fasciitis 05/30/2012     Priority: Medium    Corneal erosion 04/13/2012     Priority: Medium     Overview:   IMO Update 10/11      Fracture of foot bone without toes, closed 01/28/2009     Priority: Medium     Overview:   IMO Update 10/11      Hyperlipidemia with target LDL less than 130 09/11/2003     Priority: Medium     Diagnosis updated by automated process. Provider to review and confirm.      Osteoporosis 01/23/2003     Priority: Medium     noted on Dexa scan 2003  Problem list name updated by automated process. Provider to review      Migraine 08/31/2000     Priority: Medium     Problem list name updated by automated process. Provider to review          Past Medical History:    Past Medical History:   Diagnosis Date    Hyperlipidaemia LDL goal < 130 09/11/2003    Hypertension     menopause 12/04/2001    Migraine headaches 08/31/2000    Moderate dysplasia of cervix (ELGIN II) 03/11/2013    Osteoporosis  01/23/2003    Papanicolaou smear of cervix with low grade squamous intraepithelial lesion (LGSIL) 03/23/2009    Restless legs syndrome (RLS) 01/23/2012    Superficial injury of cornea 01/23/2012       Past Surgical History:    Past Surgical History:   Procedure Laterality Date    APPENDECTOMY  1967    ARTHROPLASTY KNEE Left 01/23/2019    Dr Edwards    ARTHROSCOPY KNEE  2000    Left knee; medial meniscal tear    ARTHROSCOPY KNEE  2010    RT    COLONOSCOPY  2004    benign polyp, repeat 2014    COLONOSCOPY N/A 10/17/2014    Procedure: COLONOSCOPY;  Surgeon: Renzo Kearney MD;  Location: [facility]    FRACTURE TX, WRIST RT/LT Left 01/2017    plate and screws placed    GYN SURGERY      See above    LAPAROSCOPY DIAGNOSTIC (GENERAL)  1992    adhesions    ORTHOPEDIC SURGERY  2017    ORIF of left distal radius using DVR Biomet Plate    SALPINGECTOMY  1967    left salpingectomy, left oophorectomy; [facility]    STRESS TEST  2010    Chest pain; negative, in [place]    SURGICAL PATHOLOGY EXAM  05/23/2013    [facility]opsy, [facility] Mayo for 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, no dysplasia noted on [facility] biopsy    ZZ STRESS THALLIUM TEST  2003    Chest pain; negative       Family History:    Family History   Problem Relation Age of Onset    Heart Disease Mother     Coronary Artery Disease Mother     Depression Mother     Anxiety Disorder Mother     Heart Disease Father     Cerebrovascular Disease Father     Coronary Artery Disease Father     Hypertension Father     Hyperlipidemia Father     Heart Disease Maternal Grandmother     Diabetes Paternal Grandmother     Breast Cancer Maternal Aunt         over 50       Social History:  Marital Status:   [2]  Social History     Tobacco Use    Smoking status: Never    Smokeless tobacco: Never   Vaping Use    Vaping status: Never Used   Substance Use Topics    Alcohol use: Not Currently     Comment: Socially    Drug use: No        Medications:    alendronate (FOSAMAX) 70 MG  tablet  amoxicillin (AMOXIL) 500 MG capsule  aspirin 81 MG EC tablet  Calcium Carbonate-Vit D-Min (CALCIUM 1200 PO)  diclofenac (VOLTAREN) 1 % topical gel  ferrous fumarate 65 mg ([place]. FE)-Vitamin C 125 mg (VITRON C)  MG TABS tablet  gabapentin (NEURONTIN) 300 MG capsule  IBUPROFEN PO  lisinopril (ZESTRIL) 10 MG tablet  Multiple Vitamins-Minerals (WOMENS MULTIVITAMIN PLUS PO)  Naltrexone POWD  pramipexole (MIRAPEX) 0.5 MG tablet  rosuvastatin (CRESTOR) 5 MG tablet          Review of Systems   All other systems reviewed and are negative.      Physical Exam   BP: (!) 165/105  Pulse: 90  Temp: 97.6  F (36.4  C)  Resp: 16  SpO2: 94 %      Physical Exam  Vitals and nursing note reviewed.   Constitutional:       General: She is not in acute distress.     Appearance: Normal appearance. She is not ill-appearing, toxic-appearing or diaphoretic.   HENT:      Head: Normocephalic and atraumatic.      Right Ear: External ear normal.      Left Ear: External ear normal.      Nose: Nose normal.      Mouth/Throat:      Mouth: Mucous membranes are moist.      Pharynx: Oropharynx is clear.   Eyes:      General: No scleral icterus.     Extraocular Movements: Extraocular movements intact.      Conjunctiva/sclera: Conjunctivae normal.      Pupils: Pupils are equal, round, and reactive to light.   Neck:      Vascular: No carotid bruit.   Cardiovascular:      Rate and Rhythm: Normal rate and regular rhythm.      Pulses: Normal pulses.      Heart sounds: Normal heart sounds.   Pulmonary:      Effort: Pulmonary effort is normal. No respiratory distress.      Breath sounds: Normal breath sounds.   Abdominal:      General: Abdomen is flat.      Palpations: Abdomen is soft.   Musculoskeletal:         General: No swelling or tenderness. Normal range of motion.      Cervical back: Normal range of motion and neck supple. No tenderness.      Right lower leg: No edema.      Left lower leg: No edema.      Comments: Patient has chronic right  shoulder pain and had some difficulties moving the right arm which she attributed to the shoulder pain but her strength fine motor coordination was normal distally   Skin:     General: Skin is warm and dry.      Capillary Refill: Capillary refill takes less than 2 seconds.      Findings: No rash.   Neurological:      Mental Status: She is alert and oriented to person, place, and time.      Cranial Nerves: No cranial nerve deficit.      Sensory: No sensory deficit.      Motor: No weakness.      Coordination: Coordination normal.      Gait: Gait abnormal.      Comments: Patient had normal speech, articulate no confusion elicited her fine motor movement with finger-to-nose finger opposition heel-to-shin was normal, no pronator drift.  Romberg positive patient had difficulty walking heel-to-toe in a straight line   Psychiatric:         Mood and Affect: Mood normal.         Behavior: Behavior normal.         Thought Content: Thought content normal.         Judgment: Judgment normal.         ED Course     ED Course as of 08/10/24 1538   Sat Aug 10, 2024   0852 Neuro evaluation done immediately upon presentation given patient's symptoms onset were 28 to 36 hours she is outside the window for stroke evaluation so stroke workup will be done but no code stroke was called.   0934 Patient about 5 minutes after having IV contrast started having shortness of breath wheezing slight tightness in her throat denies being pruritic or development of hives but appears flushed.  She had a few expiratory wheezes scattered throughout.  Pharynx is pink no angioedema seen.  No hypotension.  Will treat with Benadryl steroids and bronchodilators.   0954 Patient reassessed breathing better she had a few expiratory wheezes that are residual slightly worse on the right she has some burning-like discomfort epigastric lower chest which she feels feels like heartburn, famotidine IV ordered, repeat EKG ordered, suspect may be GERD related to  histamine release, she feels generally better   1134 Patient feels much improved lungs are clear no signs of any respiratory distress skin flushing is resolved, discussed results and plan   1154 Discussed case with hospitalist Dr. Rhodes who accepted patient for observation status   1300 CTA Head Neck with Contrast   1302 Seen by hospitalist in the ED recommend MRI and echo, neuro stroke eval as inpatient those services are not available at our facility and recommend transfer to [place]   1306 Stat doc at [facility] contacted waiting on neurology to return call   1316 Discussed case with Dr. Zaragoza whose on-call neurologist for [facility] who agrees patient requires transfer for MRI and further stroke evaluation.   1330 This case with Dr. Atkins, hospitalist at [facility] excepted patient for transfer   1500 Contacted [facility] and reviewed case with Dr. Bhupinder Cook who accepted patient for transfer   1501 [facility] called and said patient was high-priority for admission and they were working on bed availability.     Procedures  EKG time 0 829, reviewed 829.  Sinus rhythm with premature supraventricular complexes, moderate voltage criteria for left ventricular hypertrophy may be normal variant, ventricular rate 86, twelve-lead EKG similar to EKG done 12/26/2018       Repeat EKG #2 time 1001, reviewed by self at 1001, EKG shows normal sinus rhythm, moderate voltage criteria for left ventricular hypertrophy, may be normal variant, ventricular rate 84, MO, QRS, QT intervals normal, no ST abnormality, no Q waves.  Compared to EKG from 0 829 no significant interval change.    Critical Care time:  was 30 minutes for this patient excluding procedures.  Initial neuroevaluation and treatment for early anaphylactic allergic reaction to IV contrast              Results for orders placed or performed during the hospital encounter of 08/10/24 (from the past 24 hour(s))   EKG 12-lead   Result  Value Ref Range    Systolic Blood Pressure  mmHg    Diastolic Blood Pressure  mmHg    Ventricular Rate 86 BPM    Atrial Rate 86 BPM    VA Interval 198 ms    QRS Duration 76 ms     ms    QTc 411 ms    P Axis 49 degrees    R AXIS -24 degrees    T Axis 24 degrees    Interpretation ECG       Sinus rhythm with Premature supraventricular complexes  Moderate voltage criteria for LVH, may be normal variant ( R in aVL , Alex product )  Borderline ECG  No previous ECGs available     [place] Draw    Narrative    The following orders were created for panel order [place] Draw.  Procedure                               Abnormality         Status                     ---------                               -----------         ------                     Extra Blue Top Tube[[id number]]                              Final result               Extra Red Top Tube[[id number]]                               Final result               Extra Green Top (Lithium...[[id number]]                      Final result               Extra Green Top (Lithium...[[id number]]                      Final result               Extra Purple Top Tube[[id number]]                            Final result                 Please view results for these tests on the individual orders.   Extra Blue Top Tube   Result Value Ref Range    Hold Specimen JIC    Extra Red Top Tube   Result Value Ref Range    Hold Specimen JIC    Extra Green Top (Lithium Heparin) Tube   Result Value Ref Range    Hold Specimen JIC    Extra Green Top (Lithium Heparin) Tube   Result Value Ref Range    Hold Specimen JIC    Extra Purple Top Tube   Result Value Ref Range    Hold Specimen JIC    CBC with platelets differential    Narrative    The following orders were created for panel order CBC with platelets differential.  Procedure                               Abnormality         Status                     ---------                               -----------         ------                     CBC  with platelets and d...[[id number]]                      Final result                 Please view results for these tests on the individual orders.   Comprehensive metabolic panel   Result Value Ref Range    Sodium 139 135 - 145 mmol/L    Potassium 4.6 3.4 - 5.3 mmol/L    Carbon Dioxide (CO2) 20 (L) 22 - 29 mmol/L    Anion Gap 14 7 - 15 mmol/L    Urea Nitrogen 23.2 (H) 8.0 - 23.0 mg/dL    Creatinine 0.83 0.51 - 0.95 mg/dL    GFR Estimate 75 >60 mL/min/1.73m2    Calcium 9.2 8.8 - 10.4 mg/dL    Chloride 105 98 - 107 mmol/L    Glucose 129 (H) 70 - 99 mg/dL    Alkaline Phosphatase 62 40 - 150 U/L    AST 30 0 - 45 U/L    ALT 49 0 - 50 U/L    Protein Total 7.3 6.4 - 8.3 g/dL    Albumin 4.3 3.5 - 5.2 g/dL    Bilirubin Total 0.4 <=1.2 mg/dL   Troponin T, High Sensitivity   Result Value Ref Range    Troponin T, High Sensitivity 7 <=14 ng/L   Magnesium   Result Value Ref Range    Magnesium 2.0 1.7 - 2.3 mg/dL   CRP inflammation   Result Value Ref Range    CRP Inflammation <3.00 <5.00 mg/L   CBC with platelets and differential   Result Value Ref Range    WBC Count 6.0 4.0 - 11.0 10e3/uL    RBC Count 5.12 3.80 - 5.20 10e6/uL    Hemoglobin 15.1 11.7 - 15.7 g/dL    Hematocrit 45.6 35.0 - 47.0 %    MCV 89 78 - 100 fL    MCH 29.5 26.5 - 33.0 pg    MCHC 33.1 31.5 - 36.5 g/dL    RDW 14.3 10.0 - 15.0 %    Platelet Count 182 150 - 450 10e3/uL    % Neutrophils 65 %    % Lymphocytes 23 %    % Monocytes 8 %    % Eosinophils 3 %    % Basophils 1 %    % Immature Granulocytes 1 %    NRBCs per 100 WBC 0 <1 /100    Absolute Neutrophils 3.9 1.6 - 8.3 10e3/uL    Absolute Lymphocytes 1.4 0.8 - 5.3 10e3/uL    Absolute Monocytes 0.5 0.0 - 1.3 10e3/uL    Absolute Eosinophils 0.2 0.0 - 0.7 10e3/uL    Absolute Basophils 0.1 0.0 - 0.2 10e3/uL    Absolute Immature Granulocytes 0.0 <=0.4 10e3/uL    Absolute NRBCs 0.0 10e3/uL   CT Head w/o Contrast    Narrative    PROCEDURE: CT HEAD W/O CONTRAST     HISTORY: dizziness, unsteady on feet, difficulty with  thoughts,  blurred vision 28-36hrs.    COMPARISON: None.    TECHNIQUE:  Helical images of the head from the foramen magnum to the  vertex were obtained without contrast.    FINDINGS: The ventricles and sulci are normal in volume. No acute  intracranial hemorrhage, mass effect, midline shift, hydrocephalus or  basilar cystern effacement are present.    The grey-white matter interface is preserved.    The calvarium is intact. The mastoid air cells are clear.  The  visualized paranasal sinuses are clear.      Impression    IMPRESSION: Normal brain      MIN BONILLA MD         SYSTEM ID:  [id number]   CTA Head Neck with Contrast    Narrative    PROCEDURE: CTA HEAD NECK W CONTRAST 8/10/2024 9:36 AM    HISTORY: dizziness, blurred vision, difficulty with thoughts, unsteady  gait 28-36 hours    COMPARISONS: None.    Meds/Dose Given: 67cc iso 370    TECHNIQUE: CT angiogram of the neck and CT angiogram of the brain with  sagittal and coronal MIPS reconstructions    FINDINGS: CT angiogram of the neck: The brachiocephalic artery is  widely patent. The right subclavian and right vertebral arteries are  normal. The right common carotid right carotid bifurcation and right  internal carotid arteries are normal. The left common carotid left  carotid bifurcation and left internal carotid arteries are normal. The  left subclavian and left vertebral arteries are normal.    CT angiogram of the brain: The distal vertebral basilar superior  cerebellar and posterior cerebral arteries are normal. The carotid  siphons are widely patent. Both supraclinoid internal carotid arteries  are normal. Both anterior and middle cerebral arteries are widely  patent.    The muscles of mastication and parapharyngeal spaces are normal. The  salivary glands are normal. The larynx and upper trachea are normal.  Cervical and supraclavicular lymph nodes are normal. The thyroid gland  was larger obscured by streak artifact. The upper mediastinal  lymph  nodes are normal. The lung apices are clear.         Impression    IMPRESSION: No hemodynamically significant stenoses or occlusions are  seen in the arteries of the neck and brain    MIN BONILLA MD         SYSTEM ID:  [id number]   EKG 12-lead, tracing only   Result Value Ref Range    Systolic Blood Pressure  mmHg    Diastolic Blood Pressure  mmHg    Ventricular Rate 84 BPM    Atrial Rate 84 BPM    WY Interval 190 ms    QRS Duration 90 ms     ms    QTc 430 ms    P Axis 46 degrees    R AXIS -15 degrees    T Axis 14 degrees    Interpretation ECG       Sinus rhythm  Moderate voltage criteria for LVH, may be normal variant ( R in aVL , Alex product )  Borderline ECG  When compared with ECG of 10-Aug-2024 08:29, (unconfirmed)  Premature supraventricular complexes are no longer Present     XR Chest Port 1 View    Narrative    PROCEDURE:  XR CHEST PORT 1 VIEW    HISTORY:  AMS.     COMPARISON:  None.    FINDINGS:   The cardiac silhouette is normal in size. The pulmonary vasculature is  normal.  There is some linear subsegmental atelectasis or scarring at  the left lung base. No pleural effusion or pneumothorax.      Impression    IMPRESSION:  Subsegmental atelectasis or scarring at the left lung  base      MIN BONILLA MD         SYSTEM ID:  [id number]   UA with Microscopic reflex to Culture    Specimen: Urine, Midstream   Result Value Ref Range    Color Urine Light Yellow Colorless, Straw, Light Yellow, Yellow    Appearance Urine Clear Clear    Glucose Urine Negative Negative mg/dL    Bilirubin Urine Negative Negative    Ketones Urine Negative Negative mg/dL    Specific Gravity Urine 1.015 1.003 - 1.035    Blood Urine Negative Negative    pH Urine 5.5 4.7 - 8.0    Protein Albumin Urine Negative Negative mg/dL    Urobilinogen Urine Normal Normal, 2.0 mg/dL    Nitrite Urine Negative Negative    Leukocyte Esterase Urine Negative Negative    RBC Urine 2 <=2 /HPF    WBC Urine <1 <=5 /HPF    Squamous  Epithelials Urine 1 <=1 /HPF    Narrative    Urine Culture not indicated       Medications   famotidine (PEPCID) suspension 40 mg (40 mg Oral $Given 8/10/24 1002)   sodium chloride 0.9 % infusion ( Intravenous $New Bag 8/10/24 1158)   iopamidol (ISOVUE-370) solution 117 mL (67 mLs Intravenous $Given 8/10/24 0921)   sodium chloride (PF) 0.9% PF flush 100 mL (100 mLs Intravenous $Given 8/10/24 0937)   ipratropium - albuterol 0.5 mg/2.5 mg/3 mL (DUONEB) neb solution 3 mL (3 mLs Nebulization $Given 8/10/24 0937)   methylPREDNISolone sodium succinate (solu-MEDROL) injection 125 mg (125 mg Intravenous $Given 8/10/24 0938)   diphenhydrAMINE (BENADRYL) injection 25 mg (25 mg Intravenous $Given 8/10/24 0938)   sodium chloride 0.9% BOLUS 500 mL (0 mLs Intravenous Stopped 8/10/24 1300)       Assessments & Plan (with Medical Decision Making)     I have reviewed the nursing notes.    I have reviewed the findings, diagnosis, plan and need for follow up with the patient.           Medical Decision Making  The patient's presentation was of moderate complexity (an acute illness with systemic symptoms).    The patient's evaluation involved:  ordering and/or review of 3+ test(s) in this encounter (CT scans, EKGs and multiple lab investigations)    The patient's management necessitated high risk (a decision regarding hospitalization).    Patient is a 69-year-old female with past medical history as above who presents to the ED with complaint of some dizziness or lightheadedness blurred vision and feeling unsteady on her feet feeling like she is having difficulty with her thoughts and concentration.  Patient's symptoms have been 28 to 36 hours duration and therefore no stroke code was called.  History was obtained from the patient.  Presentation combined with history increase my concerns for differential diagnosis.  It was decided necessary to order ED investigations to properly assess the patient's acute emergent complaint.  My  independent interpretation of imaging is in agreement with radiology interpretation she had a negative CT scan of her head, CTA of her head and neck was unremarkable perfusion scan was unable to be performed given patient had a development of a allergic reaction to the IV contrast.  Patient initial EKG had a sinus rhythm also EKG suggested left ventricular hypertrophy she had a repeat 1 when she was having some mild centralized discomfort which she described as heartburn during her allergic reaction and repeat EKG was normal and now is asymptomatic.  Critical care was provided for her early anaphylaxis reaction treated with Benadryl Solu-Medrol famotidine, epinephrine was not required.  ED labs see above she did have elevated BUN and creatinine was normal suggesting dehydration.  After prolonged ED observation interpretation of vital signs history physical and ED studies feel the patient had a adverse drug reaction likely secondary to taking Mirapex and 600 mg of gabapentin together and with being dehydrated had increased hemoconcentration so those drugs and delayed half-lives.  Shared decision making discussed in layman terms the patient agrees with plan she has now had Benadryl discussed being observed today and see if her gait clears she will be gently hydrated and monitor for return of allergic symptoms.  Will discuss case with hospitalist.  Discussed case with hospitalist concerns over possibility of stroke still arise I reexamined patient and no improvement in her neurological deficits of Romberg and heel-to-toe walking in a straight line as well as gait.  MRI echo are not available here.  Given the timeline no interventions are warranted at this time.  Patient was agreeable to transfer to [facility].  I contacted Dr. Zaragoza who is on-call for stroke neurology who concurred that patient requires transfer for those services, waiting on call back from hospitalist for acceptance to [facility] in  [place].  New Prescriptions    No medications on file       Final diagnoses:   Allergic reaction, initial encounter   Adverse effect of drug, initial encounter   Dehydration   Gait disturbance, post-stroke       8/10/2024   [facility]       Bryn Pagan MD  08/10/24 1155       Bryn Pagan MD  08/10/24 1331       Bryn Pagan MD  08/10/24 1536       Bryn Pagan MD  08/10/24 1539

## 2024-08-10 NOTE — ED NOTES
No stroke code activation. [facility] approx 36 hrs prior to arrival. Patient is a/ox4. Reports dizziness especially when standing. Off balance when standing/ambulating, not to one side more than the other. Strength seemingly equal bilaterally. No facial droop noted. Speech WDL. Reports blurry vision at times. No double vision or other vision symptoms. Denies headache.

## 2024-08-11 NOTE — TELEPHONE ENCOUNTER
Pt's  calling in stating his wife is being transferred from the [facility] to [facility]. Reports he called [facility] and they stated pt is not there. States he was unable to get a hold of the [facility].      Writer unable to discuss pt information as their is not consent on file. Writer discussed that sometimes transfers can take a while. Discussed that I can try to connect him with the [facility].     Writer was able to contact [facility] and get pt's spouse connected.           No need for triage.         Makeda Gayle RN on 8/10/2024 at 9:12 PM    Reason for Disposition   General information question, no triage required and triager able to answer question     Connected pt's spouse with [facility] per his request.    Protocols used: Information Only Call - No Triage-A-

## 2025-03-12 NOTE — TELEPHONE ENCOUNTER
GABAPENTIN 300MG CAPSULE        Last Written Prescription Date:  10/28/22  Last Fill Quantity: 90,   # refills: 0  Last Office Visit: 11/11/22  Future Office visit:       Routing refill request to provider for review/approval because:    Drug not on the [facility], [facility] or [facility] refill protocol or controlled substance    
details…